# Patient Record
Sex: FEMALE | Race: WHITE | NOT HISPANIC OR LATINO | Employment: FULL TIME | URBAN - METROPOLITAN AREA
[De-identification: names, ages, dates, MRNs, and addresses within clinical notes are randomized per-mention and may not be internally consistent; named-entity substitution may affect disease eponyms.]

---

## 2017-01-17 ENCOUNTER — HISTORICAL (OUTPATIENT)
Dept: CARDIOLOGY | Facility: HOSPITAL | Age: 32
End: 2017-01-17

## 2017-02-06 ENCOUNTER — HISTORICAL (OUTPATIENT)
Dept: RADIOLOGY | Facility: HOSPITAL | Age: 32
End: 2017-02-06

## 2017-07-03 ENCOUNTER — HISTORICAL (OUTPATIENT)
Dept: LAB | Facility: HOSPITAL | Age: 32
End: 2017-07-03

## 2017-07-03 LAB
ABS NEUT (OLG): 5.81 X10(3)/MCL (ref 2.1–9.2)
BASOPHILS # BLD AUTO: 0.05 X10(3)/MCL
BASOPHILS NFR BLD AUTO: 1 % (ref 0–1)
EOSINOPHIL # BLD AUTO: 0.17 10*3/UL
EOSINOPHIL NFR BLD AUTO: 2 % (ref 0–5)
ERYTHROCYTE [DISTWIDTH] IN BLOOD BY AUTOMATED COUNT: 12.2 % (ref 11.5–14.5)
HCT VFR BLD AUTO: 35.6 % (ref 35–46)
HGB BLD-MCNC: 12.2 GM/DL (ref 12–16)
IMM GRANULOCYTES # BLD AUTO: 0.05 10*3/UL
IMM GRANULOCYTES NFR BLD AUTO: 1 %
LYMPHOCYTES # BLD AUTO: 1.98 X10(3)/MCL
LYMPHOCYTES NFR BLD AUTO: 23 % (ref 15–40)
MCH RBC QN AUTO: 29.7 PG (ref 26–34)
MCHC RBC AUTO-ENTMCNC: 34.3 GM/DL (ref 31–37)
MCV RBC AUTO: 86.6 FL (ref 80–100)
MONOCYTES # BLD AUTO: 0.72 X10(3)/MCL
MONOCYTES NFR BLD AUTO: 8 % (ref 4–12)
NEUTROPHILS # BLD AUTO: 5.81 X10(3)/MCL
NEUTROPHILS NFR BLD AUTO: 66 X10(3)/MCL
PLATELET # BLD AUTO: 275 X10(3)/MCL (ref 130–400)
PMV BLD AUTO: 11.5 FL (ref 7.4–10.4)
RBC # BLD AUTO: 4.11 X10(6)/MCL (ref 4–5.2)
WBC # SPEC AUTO: 8.8 X10(3)/MCL (ref 4.5–11)

## 2017-07-07 ENCOUNTER — HISTORICAL (OUTPATIENT)
Dept: ADMINISTRATIVE | Facility: HOSPITAL | Age: 32
End: 2017-07-07

## 2017-08-25 ENCOUNTER — HISTORICAL (OUTPATIENT)
Dept: CARDIOLOGY | Facility: CLINIC | Age: 32
End: 2017-08-25

## 2017-08-25 LAB
ALBUMIN SERPL-MCNC: 3.8 GM/DL (ref 3.4–5)
ALBUMIN/GLOB SERPL: 1 RATIO (ref 1–2)
ALP SERPL-CCNC: 65 UNIT/L (ref 45–117)
ALT SERPL-CCNC: 36 UNIT/L (ref 12–78)
AST SERPL-CCNC: 20 UNIT/L (ref 15–37)
BILIRUB SERPL-MCNC: 0.2 MG/DL (ref 0.2–1)
BILIRUBIN DIRECT+TOT PNL SERPL-MCNC: <0.1 MG/DL
BILIRUBIN DIRECT+TOT PNL SERPL-MCNC: >0.1 MG/DL
BUN SERPL-MCNC: 14 MG/DL (ref 7–18)
CALCIUM SERPL-MCNC: 8.9 MG/DL (ref 8.5–10.1)
CHLORIDE SERPL-SCNC: 103 MMOL/L (ref 98–107)
CO2 SERPL-SCNC: 30 MMOL/L (ref 21–32)
CREAT SERPL-MCNC: 0.7 MG/DL (ref 0.6–1.3)
DEPRECATED CALCIDIOL+CALCIFEROL SERPL-MC: 24.56 NG/ML (ref 30–80)
GLOBULIN SER-MCNC: 4.1 GM/ML (ref 2.3–3.5)
GLUCOSE SERPL-MCNC: 81 MG/DL (ref 74–106)
POTASSIUM SERPL-SCNC: 4.3 MMOL/L (ref 3.5–5.1)
PROT SERPL-MCNC: 7.9 GM/DL (ref 6.4–8.2)
SODIUM SERPL-SCNC: 140 MMOL/L (ref 136–145)
T4 FREE SERPL-MCNC: 0.9 NG/DL (ref 0.76–1.46)
TSH SERPL-ACNC: 1.63 MIU/L (ref 0.36–3.74)

## 2017-08-30 ENCOUNTER — HISTORICAL (OUTPATIENT)
Dept: RADIOLOGY | Facility: HOSPITAL | Age: 32
End: 2017-08-30

## 2017-09-06 ENCOUNTER — HISTORICAL (OUTPATIENT)
Dept: ADMINISTRATIVE | Facility: HOSPITAL | Age: 32
End: 2017-09-06

## 2017-09-15 ENCOUNTER — HISTORICAL (OUTPATIENT)
Dept: CARDIOLOGY | Facility: HOSPITAL | Age: 32
End: 2017-09-15

## 2017-09-22 ENCOUNTER — HISTORICAL (OUTPATIENT)
Dept: CARDIOLOGY | Facility: CLINIC | Age: 32
End: 2017-09-22

## 2017-10-13 ENCOUNTER — HISTORICAL (OUTPATIENT)
Dept: INTERNAL MEDICINE | Facility: CLINIC | Age: 32
End: 2017-10-13

## 2017-10-13 LAB
APPEARANCE, UA: CLEAR
BACTERIA #/AREA URNS AUTO: ABNORMAL /[HPF]
BILIRUB UR QL STRIP: NEGATIVE
COLOR UR: YELLOW
GLUCOSE (UA): NORMAL
HGB UR QL STRIP: NEGATIVE
HYALINE CASTS #/AREA URNS LPF: ABNORMAL /[LPF]
KETONES UR QL STRIP: NEGATIVE
LEUKOCYTE ESTERASE UR QL STRIP: NEGATIVE
NITRITE UR QL STRIP: NEGATIVE
PH UR STRIP: 5.5 [PH] (ref 4.5–8)
PROT UR QL STRIP: NEGATIVE
RBC #/AREA URNS AUTO: ABNORMAL /[HPF]
SP GR UR STRIP: 1.02 (ref 1–1.03)
SQUAMOUS #/AREA URNS LPF: ABNORMAL /[LPF]
UROBILINOGEN UR STRIP-ACNC: NORMAL
WBC #/AREA URNS AUTO: ABNORMAL /HPF

## 2017-10-17 ENCOUNTER — HISTORICAL (OUTPATIENT)
Dept: INTERNAL MEDICINE | Facility: CLINIC | Age: 32
End: 2017-10-17

## 2017-10-17 LAB
INR PPP: 0.94 (ref 0.9–1.2)
PROTHROMBIN TIME: 12.4 SECOND(S) (ref 11.9–14.4)

## 2017-11-02 ENCOUNTER — HISTORICAL (OUTPATIENT)
Dept: RADIOLOGY | Facility: HOSPITAL | Age: 32
End: 2017-11-02

## 2017-11-03 ENCOUNTER — HISTORICAL (OUTPATIENT)
Dept: INTERNAL MEDICINE | Facility: CLINIC | Age: 32
End: 2017-11-03

## 2017-11-30 ENCOUNTER — HISTORICAL (OUTPATIENT)
Dept: RADIOLOGY | Facility: HOSPITAL | Age: 32
End: 2017-11-30

## 2018-02-21 ENCOUNTER — HISTORICAL (OUTPATIENT)
Dept: WOUND CARE | Facility: HOSPITAL | Age: 33
End: 2018-02-21

## 2018-02-23 ENCOUNTER — HISTORICAL (OUTPATIENT)
Dept: RADIOLOGY | Facility: HOSPITAL | Age: 33
End: 2018-02-23

## 2018-03-21 ENCOUNTER — HISTORICAL (OUTPATIENT)
Dept: RADIOLOGY | Facility: HOSPITAL | Age: 33
End: 2018-03-21

## 2018-04-03 ENCOUNTER — HISTORICAL (OUTPATIENT)
Dept: RADIOLOGY | Facility: HOSPITAL | Age: 33
End: 2018-04-03

## 2018-04-27 ENCOUNTER — HISTORICAL (OUTPATIENT)
Dept: LAB | Facility: HOSPITAL | Age: 33
End: 2018-04-27

## 2018-04-27 LAB
ABS NEUT (OLG): 3.91 X10(3)/MCL (ref 2.1–9.2)
APPEARANCE, UA: CLEAR
BACTERIA #/AREA URNS AUTO: ABNORMAL /[HPF]
BASOPHILS # BLD AUTO: 0.05 X10(3)/MCL
BASOPHILS NFR BLD AUTO: 1 %
BILIRUB UR QL STRIP: NEGATIVE
BUN SERPL-MCNC: 19 MG/DL (ref 7–18)
CALCIUM SERPL-MCNC: 8.6 MG/DL (ref 8.5–10.1)
CHLORIDE SERPL-SCNC: 109 MMOL/L (ref 98–107)
CO2 SERPL-SCNC: 27 MMOL/L (ref 21–32)
COLOR UR: YELLOW
CREAT SERPL-MCNC: 0.9 MG/DL (ref 0.6–1.3)
CREAT/UREA NIT SERPL: 21
EOSINOPHIL # BLD AUTO: 0.49 X10(3)/MCL
EOSINOPHIL NFR BLD AUTO: 7 %
ERYTHROCYTE [DISTWIDTH] IN BLOOD BY AUTOMATED COUNT: 11.9 % (ref 11.5–14.5)
GLUCOSE (UA): NORMAL
GLUCOSE SERPL-MCNC: 92 MG/DL (ref 74–106)
HAV IGM SERPL QL IA: NONREACTIVE
HBV CORE IGM SERPL QL IA: NONREACTIVE
HBV SURFACE AG SERPL QL IA: NEGATIVE
HCT VFR BLD AUTO: 41.4 % (ref 35–46)
HCV AB SERPL QL IA: NONREACTIVE
HGB BLD-MCNC: 14.2 GM/DL (ref 12–16)
HGB UR QL STRIP: NEGATIVE
HIV 1+2 AB+HIV1 P24 AG SERPL QL IA: NONREACTIVE
HYALINE CASTS #/AREA URNS LPF: ABNORMAL /[LPF]
IMM GRANULOCYTES # BLD AUTO: 0.02 10*3/UL
IMM GRANULOCYTES NFR BLD AUTO: 0 %
INR PPP: 0.95 (ref 0.9–1.2)
KETONES UR QL STRIP: NEGATIVE
LEUKOCYTE ESTERASE UR QL STRIP: NEGATIVE
LYMPHOCYTES # BLD AUTO: 1.85 X10(3)/MCL
LYMPHOCYTES NFR BLD AUTO: 27 % (ref 13–40)
MCH RBC QN AUTO: 29.8 PG (ref 26–34)
MCHC RBC AUTO-ENTMCNC: 34.3 GM/DL (ref 31–37)
MCV RBC AUTO: 86.8 FL (ref 80–100)
MONOCYTES # BLD AUTO: 0.58 X10(3)/MCL
MONOCYTES NFR BLD AUTO: 8 % (ref 4–12)
NEUTROPHILS # BLD AUTO: 3.91 X10(3)/MCL
NEUTROPHILS NFR BLD AUTO: 57 X10(3)/MCL
NITRITE UR QL STRIP: NEGATIVE
PH UR STRIP: 6 [PH] (ref 4.5–8)
PLATELET # BLD AUTO: 207 X10(3)/MCL (ref 130–400)
PMV BLD AUTO: 11.1 FL (ref 7.4–10.4)
POTASSIUM SERPL-SCNC: 4 MMOL/L (ref 3.5–5.1)
PROT UR QL STRIP: 20 MG/DL
PROTHROMBIN TIME: 12 SECOND(S) (ref 11.9–14.4)
RBC # BLD AUTO: 4.77 X10(6)/MCL (ref 4–5.2)
RBC #/AREA URNS AUTO: ABNORMAL /[HPF]
SODIUM SERPL-SCNC: 143 MMOL/L (ref 136–145)
SP GR UR STRIP: 1.03 (ref 1–1.03)
SQUAMOUS #/AREA URNS LPF: ABNORMAL /[LPF]
T PALLIDUM AB SER QL: NONREACTIVE
UROBILINOGEN UR STRIP-ACNC: NORMAL
WBC # SPEC AUTO: 6.9 X10(3)/MCL (ref 4.5–11)
WBC #/AREA URNS AUTO: ABNORMAL /HPF

## 2018-05-11 ENCOUNTER — HISTORICAL (OUTPATIENT)
Dept: ADMINISTRATIVE | Facility: HOSPITAL | Age: 33
End: 2018-05-11

## 2018-05-11 LAB
BILIRUB SERPL-MCNC: NEGATIVE MG/DL
BLOOD URINE, POC: NEGATIVE
CLARITY, POC UA: CLEAR
COLOR, POC UA: YELLOW
GLUCOSE UR QL STRIP: NEGATIVE
KETONES UR QL STRIP: NEGATIVE
LEUKOCYTE EST, POC UA: NEGATIVE
NITRITE, POC UA: NEGATIVE
PH, POC UA: 6.5
PROTEIN, POC: NEGATIVE
SPECIFIC GRAVITY, POC UA: 1.02
UROBILINOGEN, POC UA: NORMAL

## 2018-07-05 ENCOUNTER — HISTORICAL (OUTPATIENT)
Dept: CARDIOLOGY | Facility: CLINIC | Age: 33
End: 2018-07-05

## 2018-07-05 ENCOUNTER — HISTORICAL (OUTPATIENT)
Dept: CARDIOLOGY | Facility: HOSPITAL | Age: 33
End: 2018-07-05

## 2018-07-05 LAB
D DIMER PPP IA.FEU-MCNC: 0.56 MCG/ML FEU
INR PPP: 1.04 (ref 0.9–1.2)
PROTHROMBIN TIME: 12.9 SECOND(S) (ref 11.9–14.4)

## 2018-07-13 ENCOUNTER — HISTORICAL (OUTPATIENT)
Dept: INTERNAL MEDICINE | Facility: CLINIC | Age: 33
End: 2018-07-13

## 2018-07-17 ENCOUNTER — HISTORICAL (OUTPATIENT)
Dept: ADMINISTRATIVE | Facility: HOSPITAL | Age: 33
End: 2018-07-17

## 2018-07-19 LAB — FINAL CULTURE: NORMAL

## 2018-08-13 ENCOUNTER — HISTORICAL (OUTPATIENT)
Dept: RADIOLOGY | Facility: HOSPITAL | Age: 33
End: 2018-08-13

## 2018-09-12 ENCOUNTER — HISTORICAL (OUTPATIENT)
Dept: LAB | Facility: HOSPITAL | Age: 33
End: 2018-09-12

## 2018-09-12 LAB
ABS NEUT (OLG): 6.26 X10(3)/MCL (ref 2.1–9.2)
BASOPHILS # BLD AUTO: 0.05 X10(3)/MCL
BASOPHILS NFR BLD AUTO: 1 %
EOSINOPHIL # BLD AUTO: 0.22 X10(3)/MCL
EOSINOPHIL NFR BLD AUTO: 3 %
ERYTHROCYTE [DISTWIDTH] IN BLOOD BY AUTOMATED COUNT: 12 % (ref 11.5–14.5)
HCT VFR BLD AUTO: 41.5 % (ref 35–46)
HGB BLD-MCNC: 14.4 GM/DL (ref 12–16)
IMM GRANULOCYTES # BLD AUTO: 0.01 10*3/UL
IMM GRANULOCYTES NFR BLD AUTO: 0 %
LYMPHOCYTES # BLD AUTO: 1.52 X10(3)/MCL
LYMPHOCYTES NFR BLD AUTO: 18 % (ref 13–40)
MCH RBC QN AUTO: 29.5 PG (ref 26–34)
MCHC RBC AUTO-ENTMCNC: 34.7 GM/DL (ref 31–37)
MCV RBC AUTO: 85 FL (ref 80–100)
MONOCYTES # BLD AUTO: 0.54 X10(3)/MCL
MONOCYTES NFR BLD AUTO: 6 % (ref 4–12)
NEUTROPHILS # BLD AUTO: 6.26 X10(3)/MCL
NEUTROPHILS NFR BLD AUTO: 73 X10(3)/MCL
PLATELET # BLD AUTO: 230 X10(3)/MCL (ref 130–400)
PMV BLD AUTO: 10.9 FL (ref 7.4–10.4)
RBC # BLD AUTO: 4.88 X10(6)/MCL (ref 4–5.2)
WBC # SPEC AUTO: 8.6 X10(3)/MCL (ref 4.5–11)

## 2018-10-25 ENCOUNTER — HISTORICAL (OUTPATIENT)
Dept: LAB | Facility: HOSPITAL | Age: 33
End: 2018-10-25

## 2018-11-15 LAB
INFLUENZA A ANTIGEN, POC: NEGATIVE
INFLUENZA B ANTIGEN, POC: NEGATIVE
RAPID GROUP A STREP (OHS): NEGATIVE

## 2019-01-02 ENCOUNTER — HISTORICAL (OUTPATIENT)
Dept: ADMINISTRATIVE | Facility: HOSPITAL | Age: 34
End: 2019-01-02

## 2019-03-13 ENCOUNTER — HISTORICAL (OUTPATIENT)
Dept: RADIOLOGY | Facility: HOSPITAL | Age: 34
End: 2019-03-13

## 2019-03-13 LAB — POC CREATININE: 0.7 MG/DL (ref 0.6–1.3)

## 2019-03-29 ENCOUNTER — HISTORICAL (OUTPATIENT)
Dept: RADIOLOGY | Facility: HOSPITAL | Age: 34
End: 2019-03-29

## 2019-04-02 ENCOUNTER — HISTORICAL (OUTPATIENT)
Dept: INTENSIVE CARE | Facility: HOSPITAL | Age: 34
End: 2019-04-02

## 2019-04-05 ENCOUNTER — HISTORICAL (OUTPATIENT)
Dept: RADIOLOGY | Facility: HOSPITAL | Age: 34
End: 2019-04-05

## 2019-06-12 ENCOUNTER — TELEPHONE (OUTPATIENT)
Dept: RHEUMATOLOGY | Facility: CLINIC | Age: 34
End: 2019-06-12

## 2019-06-12 ENCOUNTER — OFFICE VISIT (OUTPATIENT)
Dept: INTERNAL MEDICINE | Facility: CLINIC | Age: 34
End: 2019-06-12
Payer: COMMERCIAL

## 2019-06-12 VITALS
DIASTOLIC BLOOD PRESSURE: 88 MMHG | SYSTOLIC BLOOD PRESSURE: 122 MMHG | TEMPERATURE: 98 F | WEIGHT: 197.75 LBS | HEART RATE: 102 BPM | OXYGEN SATURATION: 98 %

## 2019-06-12 DIAGNOSIS — L20.89 OTHER ATOPIC DERMATITIS: ICD-10-CM

## 2019-06-12 DIAGNOSIS — D32.0 MENINGIOMA, CEREBRAL: ICD-10-CM

## 2019-06-12 DIAGNOSIS — G89.29 OTHER CHRONIC PAIN: ICD-10-CM

## 2019-06-12 DIAGNOSIS — R76.8 POSITIVE ANA (ANTINUCLEAR ANTIBODY): Primary | ICD-10-CM

## 2019-06-12 DIAGNOSIS — M51.37 DDD (DEGENERATIVE DISC DISEASE), LUMBOSACRAL: ICD-10-CM

## 2019-06-12 PROBLEM — M51.379 DDD (DEGENERATIVE DISC DISEASE), LUMBOSACRAL: Status: ACTIVE | Noted: 2019-04-10

## 2019-06-12 PROBLEM — G47.00 INSOMNIA: Status: ACTIVE | Noted: 2019-06-12

## 2019-06-12 PROBLEM — K21.9 GASTROESOPHAGEAL REFLUX DISEASE: Status: ACTIVE | Noted: 2019-06-12

## 2019-06-12 PROBLEM — M50.30 DEGENERATIVE CERVICAL DISC: Status: ACTIVE | Noted: 2019-06-12

## 2019-06-12 PROBLEM — F41.9 ANXIETY: Status: ACTIVE | Noted: 2019-03-29

## 2019-06-12 PROBLEM — N30.10 INTERSTITIAL CYSTITIS: Status: ACTIVE | Noted: 2019-06-12

## 2019-06-12 PROBLEM — M62.838 MUSCLE SPASMS OF NECK: Status: ACTIVE | Noted: 2019-06-12

## 2019-06-12 PROBLEM — D33.2 BENIGN NEOPLASM OF BRAIN: Status: ACTIVE | Noted: 2019-06-12

## 2019-06-12 PROBLEM — M79.10 MYALGIA: Status: ACTIVE | Noted: 2019-03-29

## 2019-06-12 PROCEDURE — 99999 PR PBB SHADOW E&M-NEW PATIENT-LVL IV: ICD-10-PCS | Mod: PBBFAC,,, | Performed by: FAMILY MEDICINE

## 2019-06-12 PROCEDURE — 99999 PR PBB SHADOW E&M-NEW PATIENT-LVL IV: CPT | Mod: PBBFAC,,, | Performed by: FAMILY MEDICINE

## 2019-06-12 PROCEDURE — 99204 OFFICE O/P NEW MOD 45 MIN: CPT | Mod: S$GLB,,, | Performed by: FAMILY MEDICINE

## 2019-06-12 PROCEDURE — 99204 PR OFFICE/OUTPT VISIT, NEW, LEVL IV, 45-59 MIN: ICD-10-PCS | Mod: S$GLB,,, | Performed by: FAMILY MEDICINE

## 2019-06-12 RX ORDER — TRAMADOL HYDROCHLORIDE 50 MG/1
TABLET ORAL
Refills: 0 | COMMUNITY
Start: 2019-05-23 | End: 2019-06-27 | Stop reason: SDUPTHER

## 2019-06-12 RX ORDER — CETIRIZINE HYDROCHLORIDE 10 MG/1
10 TABLET ORAL DAILY
COMMUNITY
End: 2019-06-27

## 2019-06-12 RX ORDER — SUMATRIPTAN SUCCINATE 100 MG/1
TABLET ORAL
Refills: 0 | COMMUNITY
Start: 2019-03-28 | End: 2019-06-27

## 2019-06-12 RX ORDER — BUTALBITAL, ACETAMINOPHEN AND CAFFEINE 50; 325; 40 MG/1; MG/1; MG/1
TABLET ORAL
Refills: 0 | COMMUNITY
Start: 2019-03-28 | End: 2019-06-12

## 2019-06-12 RX ORDER — ALBUTEROL SULFATE 90 UG/1
2 AEROSOL, METERED RESPIRATORY (INHALATION) EVERY 6 HOURS PRN
COMMUNITY
Start: 2019-04-02

## 2019-06-12 RX ORDER — CLONAZEPAM 1 MG/1
1 TABLET ORAL 3 TIMES DAILY PRN
COMMUNITY
Start: 2018-09-22 | End: 2022-11-18 | Stop reason: SDUPTHER

## 2019-06-12 RX ORDER — HYDROCODONE BITARTRATE AND ACETAMINOPHEN 5; 325 MG/1; MG/1
TABLET ORAL
Refills: 0 | COMMUNITY
Start: 2019-03-29 | End: 2019-06-12

## 2019-06-12 RX ORDER — FLUTICASONE PROPIONATE 50 MCG
SPRAY, SUSPENSION (ML) NASAL
COMMUNITY
Start: 2018-12-02 | End: 2019-06-27

## 2019-06-12 RX ORDER — DOCUSATE SODIUM 100 MG
CAPSULE ORAL
Refills: 0 | COMMUNITY
Start: 2019-04-27 | End: 2019-06-12

## 2019-06-12 RX ORDER — OXCARBAZEPINE 150 MG/1
TABLET, FILM COATED ORAL
Refills: 0 | COMMUNITY
Start: 2019-03-21 | End: 2019-06-12

## 2019-06-12 RX ORDER — CARISOPRODOL 350 MG/1
175 TABLET ORAL NIGHTLY
Refills: 0 | COMMUNITY
Start: 2019-03-15 | End: 2019-06-12

## 2019-06-12 RX ORDER — ONDANSETRON 4 MG/1
TABLET, ORALLY DISINTEGRATING ORAL
COMMUNITY
Start: 2019-06-09 | End: 2019-06-12

## 2019-06-12 RX ORDER — PAROXETINE HYDROCHLORIDE 40 MG/1
TABLET, FILM COATED ORAL
Refills: 0 | COMMUNITY
Start: 2019-03-07 | End: 2019-06-27

## 2019-06-12 RX ORDER — ORPHENADRINE CITRATE 100 MG/1
TABLET, EXTENDED RELEASE ORAL
Refills: 2 | COMMUNITY
Start: 2019-04-24 | End: 2019-06-12

## 2019-06-12 NOTE — PROGRESS NOTES
[facility]  06/14/2019  [id number]    La Landrum MD  Patient Care Team:  La Landrum MD as PCP - General (Family Medicine)  Has the patient seen any provider outside of the [facility] network since the last visit? (yes). If yes, HIPPA forms completed and records requested.        Visit Type:Establish care    Chief Complaint:  No chief complaint on file.      History of Present Illness:  Patient new to [facility].  She has seen Dr. Liriano at [facility], and I can see those notes in care everywhere.  She had her care in [place] prior to relocating to [place].    She has history of Meningiomona, chronic Headaches, chronic rash and skin irritations, and positive SARAH    She is here for pain. She reports worsening over time. She reports that she has had falls. She reports pain all over. She is here because she is not sure what is going on.    She reports she saw Dr. uY. She reports he is neurologist. She had approached with to get an LP, to r/o MS.      She had presented to her primary care with a rash and joint pain she has seen Dr. Luz Burns dermatologist who diagnosed her with atopic dermatitis she was getting infusions for this and doing much better.     She also of note was diagnosed with a brain meningioma that is noninvasive at age 23. She was followed by Dr. Gray neurosurgeon she reports she is been having intermittent testing and he feels like it stable lately she has had some increased head pressure she had seen a second neurologist, as she continues to c/o headaches. She is treated for migraine headaches and has had cervical spine fusion surgery as well in 2009.   She has a history of endometriosis and had a partial hysterectomy at age 24.   She also reports she has chronic sinusitis and followed with ear nose and throat. Her recent workup for him was normal.     She also has chronic condition of interstitial cystitis with with an ovarian cyst that occasionally causes issues.    She is  "treated for depression anxiety as well as migraine headaches. 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  Lexapro, Paxil (Did not tolerate Cymbalta). She does have Klonopin.  She does have HA a lot and Nausea.     She has a true allergy to ibuprofen.     She reports joint pain particular involving both knees ankles wrists fingers and elbows. She is here c/o body pain.  She believes she had a positive SARAH test 1: 320 and elevated CRP on initial workup but those records are not yet available to me.  She did see Rheum at [facility], and I reviewed those results. Didn't see a indication that she had evidence of active autoimmune disease. Felt as if she had some overlapping fibromyalgia symptoms. DIdn't want to start any immunosuppressants and worked on weaning off chronic predinsone that her PCP started. Rash was dx as severe atopic dermaitits.     Rheum recommended on 4/10    " I see no signs of acute autoimmune disease specifically lupus or lupus-like condition or rheumatoid arthritis and provided reassurance to the patient I will continue to follow her positive SARAH test but at this time I do not think is clinically significant patient has weaned herself off her prednisone from 10 mg to 5 mg to 0 mg. Unfortunately her atopic dermatitis is now worse and her degenerative disc disease chronic pain is worse. She will follow-up with her dermatologist allergist and she recently saw neurologist as well. We did discuss possibly seeing an academic center due to her complicated medical case. I did reassure her that I do not see any signs of active autoimmune disease regarding lupus or rheumatoid arthritis but will continue to monitor. It is probable that some of her pain is related to fibromyalgia related to persistent pain and poor sleep. However I do not want to adjust any medications at this time she remains on Klonopin as needed and tramadol as needed I did give " "her a prescription of tramadol 1 every 8 hours as needed quantity 60 with no refills of asked her to follow-up with neurology for further care."    EEG done, she reports that she has been having "Spells" Blanking out, and she had trouble spelling names.     She comes in for the same issues today.     She is not aware of any family history of autoimmune disease she has 3 healthy children her parents are both .     History:  Past Medical History:   Diagnosis Date    ADHD (attention deficit hyperactivity disorder)     Allergy     Anxiety     Atopic dermatitis 2018    Brain tumor 2008    Endometriosis     GERD (gastroesophageal reflux disease)     IC (interstitial cystitis)     Obsessive-compulsive disorder     Seizures      Past Surgical History:   Procedure Laterality Date    AUGMENTATION OF BREAST      CERVICAL FUSION      HYSTERECTOMY      LAPAROSCOPIC DRAINAGE OF CYST OF OVARY      NASAL SEPTOPLASTY  2007     Family History   Problem Relation Age of Onset    Drug abuse Mother     Hypertension Mother     Heart disease Father     Hypertension Father     Alcohol abuse Father     Early death Father      Social History     Socioeconomic History    Marital status:      Spouse name: Not on file    Number of children: Not on file    Years of education: Not on file    Highest education level: Not on file   Occupational History    Not on file   Social Needs    Financial resource strain: Not on file    Food insecurity:     Worry: Not on file     Inability: Not on file    Transportation needs:     Medical: Not on file     Non-medical: Not on file   Tobacco Use    Smoking status: Former Smoker    Smokeless tobacco: Never Used   Substance and Sexual Activity    Alcohol use: Yes     Frequency: Monthly or less     Drinks per session: 1 or 2     Binge frequency: Never    Drug use: Never    Sexual activity: Yes   Lifestyle    Physical activity:     Days per week: Not on file     " Minutes per session: Not on file    Stress: Not at all   Relationships    Social connections:     Talks on phone: Not on file     Gets together: Not on file     Attends [employer] service: Not on file     Active member of club or organization: Not on file     Attends meetings of clubs or organizations: Not on file     Relationship status: Not on file   Other Topics Concern    Not on file   Social History Narrative    Not on file     Patient Active Problem List   Diagnosis    Anxiety    Benign neoplasm of brain    DDD (degenerative disc disease), lumbosacral    Degenerative cervical disc    Gastroesophageal reflux disease    Insomnia    Interstitial cystitis    Meningioma, cerebral    Muscle spasms of neck    Myalgia    Other atopic dermatitis    Positive SARAH (antinuclear antibody)     Review of patient's allergies indicates:   Allergen Reactions    Ibuprofen Anaphylaxis    Metoclopramide Anaphylaxis and Other (See Comments)     Other reaction(s): Paralysis of vertical movement      Carisoprodol Other (See Comments)    Gabapentin Other (See Comments)     Other reaction(s): C/O - a headache      Clindamycin Rash    Cyclobenzaprine Palpitations     Other reaction(s): SOB and increased heart rate      Morphine Itching    Penicillin v potassium Rash     Other reaction(s): Rash      Rizatriptan Nausea And Vomiting       The following were reviewed at this visit: active problem list, medication list, allergies, family history, social history, and health maintenance.    Medications:  Current Outpatient Medications on File Prior to Visit   Medication Sig Dispense Refill    albuterol (PROAIR HFA) 90 mcg/actuation inhaler Take by mouth.      cetirizine (ZYRTEC) 10 MG tablet Take 10 mg by mouth once daily.      clonazePAM (KLONOPIN) 1 MG tablet Take 1 mg by mouth 3 (three) times daily as needed.      dupilumab (DUPIXENT) 300 mg/2 mL Syrg Inject 300 mg into the skin.      fluticasone propionate  (FLONASE) 50 mcg/actuation nasal spray by Nasal route.      Lactobacillus rhamnosus GG (CULTURELLE) 10 billion cell capsule Take 1 capsule by mouth once daily.      traMADol (ULTRAM) 50 mg tablet TK 1 T PO Q 6 H AS NEEDED P FOR UP TO 30 DAYS  0    paroxetine (PAXIL) 40 MG tablet TK 1 T PO QD  0    sumatriptan (IMITREX) 100 MG tablet TK 1 T PO  Q 2 H PRF MIGRAINE FOR UP TO 7 DAYS. MAX DOSE A DAY IS 200MG  0     No current facility-administered medications on file prior to visit.        Medications have been reviewed and reconciled with patient at this visit.  Barriers to medications present (yes)    Adverse reactions to current medications (no)    Over the counter medications reviewed (Yes ), and if needed added to active Medication list at this visit.     Exam:  Wt Readings from Last 3 Encounters:   06/13/19 90.2 kg (198 lb 13.7 oz)   06/12/19 89.7 kg (197 lb 12 oz)     Temp Readings from Last 3 Encounters:   06/12/19 97.6 °F (36.4 °C) (Tympanic)     BP Readings from Last 3 Encounters:   06/13/19 (!) 128/92   06/12/19 122/88     Pulse Readings from Last 3 Encounters:   06/13/19 95   06/12/19 102     There is no height or weight on file to calculate BMI.      Review of Systems   Constitutional: Negative.  Negative for chills and fever.   HENT: Negative.  Negative for congestion, sinus pain and sore throat.    Eyes: Negative for blurred vision and double vision.   Respiratory: Negative for cough, sputum production, shortness of breath and wheezing.    Cardiovascular: Negative for chest pain, palpitations and leg swelling.   Gastrointestinal: Negative for abdominal pain, constipation, diarrhea, heartburn, nausea and vomiting.   Genitourinary: Negative.    Musculoskeletal: Positive for back pain, joint pain and neck pain.   Skin: Negative.  Negative for rash.   Neurological: Positive for headaches.   Endo/Heme/Allergies: Negative.  Negative for polydipsia. Does not bruise/bleed easily.   Psychiatric/Behavioral:  Negative for depression and substance abuse. The patient is nervous/anxious.      Physical Exam   Constitutional: She is oriented to person, place, and time. She appears well-developed and well-nourished. No distress.   HENT:   Head: Normocephalic and atraumatic.   Right Ear: External ear normal.   Left Ear: External ear normal.   Nose: Nose normal.   Mouth/Throat: Oropharynx is clear and moist. No oropharyngeal exudate.   Eyes: Pupils are equal, round, and reactive to light. Conjunctivae and EOM are normal. Right eye exhibits no discharge. Left eye exhibits no discharge.   Neck: Normal range of motion. Neck supple. No thyromegaly present.   Cardiovascular: Normal rate, regular rhythm, normal heart sounds and intact distal pulses.   No murmur heard.  Pulmonary/Chest: Effort normal and breath sounds normal. No respiratory distress. She has no wheezes.   Abdominal: Soft. Bowel sounds are normal. She exhibits no distension and no mass. There is no tenderness.   Musculoskeletal: Normal range of motion. She exhibits tenderness. She exhibits no edema.   Trigger point tenderness, cervical spine, reports leg pain and lower back pain.  Headaches with cervicalgia up to head.      No swelling in joint  Normal reflexes and strength throughout.   Lymphadenopathy:     She has no cervical adenopathy.   Neurological: She is alert and oriented to person, place, and time. No cranial nerve deficit.   Skin: Capillary refill takes less than 2 seconds. She is not diaphoretic.   Psychiatric: Her behavior is normal. Judgment and thought content normal.   Crying and tearful throughout interview and exam   Nursing note and vitals reviewed.      Laboratory Reviewed ({N/A)  No results found for: WBC, HGB, HCT, PLT, CHOL, TRIG, HDL, LDLDIRECT, ALT, AST, NA, K, CL, CREATININE, BUN, CO2, TSH, PSA, INR, GLUF, HGBA1C, MICROALBUR    Diagnoses and all orders for this visit:    Positive SARAH (antinuclear antibody)  -     Ambulatory Referral to  Rheumatology  -     Ambulatory Referral to Neurology  -     Ambulatory referral to [facility]  -     Ambulatory Referral to Neurology    Meningioma, cerebral  -     Ambulatory Referral to Rheumatology  -     Ambulatory Referral to Neurology    DDD (degenerative disc disease), lumbosacral  -     Ambulatory Referral to Rheumatology  -     Ambulatory Referral to Neurology  -     Ambulatory referral to [facility]  -     Ambulatory Referral to Neurology    Other atopic dermatitis  -     Ambulatory Referral to Rheumatology  -     Ambulatory Referral to Neurology    Other chronic pain  -     Ambulatory Referral to Rheumatology  -     Ambulatory Referral to Neurology  -     Ambulatory referral to [facility]  -     Ambulatory Referral to Neurology        Will review with Rheum.  Did not tolerate Cymbalta, Neurontin.  Need her to bring records of films.    Referred to Neurology. She is concerned with MS, but no weakness, but pain causing decrease in function.    Denies to me overt Anxiety, and reports not using Klonopin as much, and declines depression, but has frustration that she cannot work due to her pain.    Reviewed labs that were recently completed at [facility].    Plan to review to Neurology [place], for second opinion.   I will also consider pain management in [facility]. I will message provider about referral.          Care Plan/Goals: Reviewed  (Yes)  Goals     None          Follow up: No follow-ups on file.    After visit summary was printed and given to patient upon discharge today.  Patient goals and care plan are included in After Visit Summary.

## 2019-06-13 ENCOUNTER — INITIAL CONSULT (OUTPATIENT)
Dept: RHEUMATOLOGY | Facility: CLINIC | Age: 34
End: 2019-06-13
Payer: COMMERCIAL

## 2019-06-13 ENCOUNTER — LAB VISIT (OUTPATIENT)
Dept: LAB | Facility: HOSPITAL | Age: 34
End: 2019-06-13
Attending: INTERNAL MEDICINE
Payer: COMMERCIAL

## 2019-06-13 VITALS — WEIGHT: 198.88 LBS | HEART RATE: 95 BPM | DIASTOLIC BLOOD PRESSURE: 92 MMHG | SYSTOLIC BLOOD PRESSURE: 128 MMHG

## 2019-06-13 DIAGNOSIS — M79.7 FIBROMYALGIA: Primary | ICD-10-CM

## 2019-06-13 DIAGNOSIS — M79.7 FIBROMYALGIA: ICD-10-CM

## 2019-06-13 LAB — CK SERPL-CCNC: 106 U/L (ref 20–180)

## 2019-06-13 PROCEDURE — 99999 PR PBB SHADOW E&M-EST. PATIENT-LVL II: ICD-10-PCS | Mod: PBBFAC,,, | Performed by: INTERNAL MEDICINE

## 2019-06-13 PROCEDURE — 83516 IMMUNOASSAY NONANTIBODY: CPT | Mod: 59

## 2019-06-13 PROCEDURE — 36415 COLL VENOUS BLD VENIPUNCTURE: CPT

## 2019-06-13 PROCEDURE — 99999 PR PBB SHADOW E&M-EST. PATIENT-LVL II: CPT | Mod: PBBFAC,,, | Performed by: INTERNAL MEDICINE

## 2019-06-13 PROCEDURE — 86618 LYME DISEASE ANTIBODY: CPT

## 2019-06-13 PROCEDURE — 86235 NUCLEAR ANTIGEN ANTIBODY: CPT

## 2019-06-13 PROCEDURE — 82085 ASSAY OF ALDOLASE: CPT

## 2019-06-13 PROCEDURE — 82550 ASSAY OF CK (CPK): CPT

## 2019-06-13 PROCEDURE — 99204 OFFICE O/P NEW MOD 45 MIN: CPT | Mod: S$GLB,,, | Performed by: INTERNAL MEDICINE

## 2019-06-13 PROCEDURE — 86235 NUCLEAR ANTIGEN ANTIBODY: CPT | Mod: 59

## 2019-06-13 PROCEDURE — 99204 PR OFFICE/OUTPT VISIT, NEW, LEVL IV, 45-59 MIN: ICD-10-PCS | Mod: S$GLB,,, | Performed by: INTERNAL MEDICINE

## 2019-06-13 RX ORDER — HYDROCODONE BITARTRATE AND ACETAMINOPHEN 5; 325 MG/1; MG/1
TABLET ORAL
COMMUNITY
Start: 2018-12-12 | End: 2019-06-27

## 2019-06-13 RX ORDER — PANTOPRAZOLE SODIUM 40 MG/1
TABLET, DELAYED RELEASE ORAL
COMMUNITY
Start: 2018-12-02 | End: 2019-06-27

## 2019-06-13 RX ORDER — DEXTROAMPHETAMINE SACCHARATE, AMPHETAMINE ASPARTATE, DEXTROAMPHETAMINE SULFATE AND AMPHETAMINE SULFATE 7.5; 7.5; 7.5; 7.5 MG/1; MG/1; MG/1; MG/1
TABLET ORAL
COMMUNITY
Start: 2018-12-12 | End: 2019-06-27

## 2019-06-13 RX ORDER — METRONIDAZOLE 250 MG/1
TABLET ORAL
COMMUNITY
Start: 2019-04-02 | End: 2019-06-27

## 2019-06-13 NOTE — LETTER
June 13, 2019      Robyn Strickland MD  [address] [address] [address]           [facility]  [address] [address]  Phone: [phone]  Fax: [phone]          Patient: Mary Fernandez   MR Number: [id number]   YOB: 1985   Date of Visit: 6/13/2019       Dear Dr. Robyn Strickland:    Thank you for referring Mary Fernandez to me for evaluation. Attached you will find relevant portions of my assessment and plan of care.    If you have questions, please do not hesitate to call me. I look forward to following Mary Fernandez along with you.    Sincerely,    Tessa Styles MD    Enclosure  CC:  No Recipients    If you would like to receive this communication electronically, please contact [email or web address] or [phone] to request more information on [email or web address] Link access.    For providers and/or their staff who would like to refer a patient to [facility], please contact us through our one-stop-shop provider referral line, [facility], at [phone].    If you feel you have received this communication in error or would no longer like to receive these types of communications, please e-mail [email or web address]

## 2019-06-13 NOTE — PROGRESS NOTES
CC:  Chief Complaint   Patient presents with    Consult     Positive SARAH, bone pain       History of Present Illness:  Mary Tamez a 34 y.o.yo female   Patient Active Problem List   Diagnosis    Anxiety    Benign neoplasm of brain    DDD (degenerative disc disease), lumbosacral    Degenerative cervical disc    Gastroesophageal reflux disease    Insomnia    Interstitial cystitis    Meningioma, cerebral    Muscle spasms of neck    Myalgia    Other atopic dermatitis    Positive SARAH (antinuclear antibody)     Here for further evaluation of + SARAH 1:160 Homogenous , negative profile  She complains of generalized arthralgias.  She hurts all over  Pain continues all day  No relief with medications tramadol which she uses as needed  She has been intolerant to gabapentin and Cymbalta in the past  No joint swelling noted  Positive for dry eyes, dry mouth  + history of Raynaud's, no skin ulcerations  She also complains of tingling involving the left side of the body and was evaluated by neurologist.  No pathology noted    Duration of symptoms started in 2016  She was initially evaluated by Dr. Zimmerman   He did a thorough autoimmune workup which was negative except positive SARAH as mentioned above  She did mention of history of some insect bite, she requested to be tested for Lyme, which he did , she is not aware of result but was never treated for any Lyme disease    In April 2019 she was evaluated by  , in [facility]  She had extensive lab work done which was reviewed by me  RF/CCP negative  ESR/CRP normal    History of atopic dermatitis with severe rash in the past  Now resolved since starting dupixent       Past Medical History:   Diagnosis Date    ADHD (attention deficit hyperactivity disorder)     Allergy     Anxiety     Atopic dermatitis 2018    Brain tumor 2008    Endometriosis     GERD (gastroesophageal reflux disease)     IC (interstitial cystitis)     Obsessive-compulsive disorder      Seizures        Past Surgical History:   Procedure Laterality Date    AUGMENTATION OF BREAST      CERVICAL FUSION      HYSTERECTOMY      LAPAROSCOPIC DRAINAGE OF CYST OF OVARY      NASAL SEPTOPLASTY  2007         Social History     Tobacco Use    Smoking status: Former Smoker    Smokeless tobacco: Never Used   Substance Use Topics    Alcohol use: Yes     Frequency: Monthly or less     Drinks per session: 1 or 2     Binge frequency: Never    Drug use: Never       Family History   Problem Relation Age of Onset    Drug abuse Mother     Hypertension Mother     Heart disease Father     Hypertension Father     Alcohol abuse Father     Early death Father        Review of patient's allergies indicates:   Allergen Reactions    Ibuprofen Anaphylaxis    Metoclopramide Anaphylaxis and Other (See Comments)     Other reaction(s): Paralysis of vertical movement      Carisoprodol Other (See Comments)    Gabapentin Other (See Comments)     Other reaction(s): C/O - a headache      Clindamycin Rash    Cyclobenzaprine Palpitations     Other reaction(s): SOB and increased heart rate      Morphine Itching    Penicillin v potassium Rash     Other reaction(s): Rash      Rizatriptan Nausea And Vomiting             Review of Systems:  Constitutional: Denies fever, chills. No recent weight changes.   Fatigue: yes   Muscle weakness: no  Headaches:+ chronic headache  Eyes: No redness + dryness.  No recent visual changes.  ENT: +dry mouth. No oral or nasal ulcers.  Card: No chest pain.  Resp: No cough or sob.   Gastro: No nausea or vomiting.  No heartburn.  Constipation: no  Diarrhea: no  Genito:  No dysuria.  No genital ulcers.  Skin: No rash.  Raynauds:per Hpi , no clear history of Raynaud's she states sometimes she notices discolorations,   Spontaneously, but may be more prominent in cold weather  Neuro: + numbness / tingling- left side of the body  Psych: + depression, anxiety  Endo:  no excess thirst.  Heme: no  abnormal bleeding or bruising  Clots:none   Miscarriages : none     OBJECTIVE:     Vital Signs   Vitals:    06/13/19 1208   BP: (!) 128/92   Pulse: 95     Physical Exam:  General Appearance:  NAD.   Gait: not favoring.  HEENT: PERRL.  Eyes not dry or injected.  No nasal ulcers.  Mouth not dry, no oral lesions.  Lymph: cervical, supraclavicular or axillary nodes: none abnormal   Cardio: no irregularity of S1 or S2.  No gallops or rubs.   Resp: Normal respiratory motion. Clear to auscultation bilaterally.   No abnormal chest conformation.  Abd: Soft, non-tender, nondistended.  No masses.   Skin: Head and neck,  and extremities examined. No rashes.   Neuro: Ox3.   Cranial nerves II-XII grossly intact.   Sensation intact  in both distal LE and upper extremities to light touch.  Musculoskeletal Exam:    Right Side Rheumatological Exam     Examination finds the shoulder, elbow, wrist, knee, 1st PIP, 1st MCP, 2nd PIP, 2nd MCP, 3rd PIP, 3rd MCP, 4th PIP, 4th MCP, 5th PIP and 5th MCP no synovitis     Others :  Hip: No synovitis   Ankle :No synovitis   Foot:No synovitis     Left Side Rheumatological Exam     Examination finds the shoulder, elbow, wrist, knee, 1st PIP, 1st MCP, 2nd PIP, 2nd MCP, 3rd PIP, 3rd MCP, 4th PIP, 4th MCP, 5th PIP and 5th MCP no synovitis     Others :  Hip:No synovitis   Ankle :No synovitis   Foot:No synovitis       Tender points:+++  Muscle strength:Equal and full in all mm groups of the upper and lower ext.    Laboratory: No results found for this or any previous visit.  Imaging :    Notes reviewed  Other procedures:    ASSESSMENT/PLAN:     Fibromyalgia  -     Myositis AssessR Plus Kassy-1; Future; Expected date: 06/13/2019  -     CK; Standing  -     Aldolase; Standing  -     B. burgdorferi Abs (Lyme Disease); Future; Expected date: 06/13/2019  -     MyoMarker Panel 3; Future; Expected date: 06/13/2019      Autoimmune workup so far negative except SARAH  RF/CCP negative  ESR/CRP  Negative  UA - no blood  or protein  Normal complements     No evidence of connective tissue disease noted on exam  Physical exam consistent with fibromyalgia  Check further labs as above  Intolerant to gabapentin and Cymbalta in the past  On tramadol p.r.n.      SARAH + ve  1:160 homo , negative profile  With sicca symptoms, ?RP  As above will continue to monitor    Neuropathy:  She was evaluated by a neurologist at [place]  No pathology identified    2 week return    Risks vs Benefits and potential side effects of medication prescribed today were discussed with patient. Medication literature given to patient up discharge  Went over uptodate information /literature on the meds prescribed today      Disclaimer: This note was prepared using voice recognition system and is likely to have sound alike errors and is not proof read.  Please call me with any questions.

## 2019-06-14 ENCOUNTER — TELEPHONE (OUTPATIENT)
Dept: ADMINISTRATIVE | Facility: OTHER | Age: 34
End: 2019-06-14

## 2019-06-14 ENCOUNTER — TELEPHONE (OUTPATIENT)
Dept: INTERNAL MEDICINE | Facility: CLINIC | Age: 34
End: 2019-06-14

## 2019-06-14 LAB — ALDOLASE SERPL-CCNC: 4.5 U/L (ref 1.2–7.6)

## 2019-06-14 NOTE — TELEPHONE ENCOUNTER
Notify patient, I have referred to the pain management restorative clinic in [place].   I am awaiting Neurology to respond as I would like to get her with the correct Neurology to review MS work up with her.    I saw that she saw Rheum    Dr. Strickland

## 2019-06-14 NOTE — TELEPHONE ENCOUNTER
Pt returned call regarding FRP. I explained the program and she was interested. She does live in [place], but could potentially stay in the [facility] depending on price. Pt was tearful when discussing her pain and ability to function. I will send [facility] and website info.

## 2019-06-17 ENCOUNTER — TELEPHONE (OUTPATIENT)
Dept: PAIN MEDICINE | Facility: OTHER | Age: 34
End: 2019-06-17

## 2019-06-17 LAB — B BURGDOR AB SER IA-ACNC: 0.1 INDEX VALUE

## 2019-06-17 NOTE — TELEPHONE ENCOUNTER
I called pt re: FRP Med screen appt for tomorrow with me. Need to reschedule from 1:00 to 2:00 which she accepted, however she did express concerns about coming from [place] for the program if she were to enroll. Has financial concerns about travel/lodging. I provided her with the [employer] phone # to inquire about rates. She did call them and called me back. Stated it would be too expensive. Advised I will ask our Manager about Patient Assistance Funding and get back to her later this week. She is definitely interested in the program if she can make it work logistically/financially.

## 2019-06-20 ENCOUNTER — TELEPHONE (OUTPATIENT)
Dept: PAIN MEDICINE | Facility: OTHER | Age: 34
End: 2019-06-20

## 2019-06-20 NOTE — TELEPHONE ENCOUNTER
called pt to check-in about FRP/financial assistance for lodging. no new info on assistance for hotel besides what we know, but we are working on it. Advised we do have pt assistance funds but need to discuss with team re: allocation of those funds. she also has several appts with specialists coming up in June/July (R[facility] and Neuro), and I advised it will be best to have those appts prior to being screening for FRP. She agrees. Will reach out around 8/1.

## 2019-06-25 LAB
EJ AB SER QL: NOT DETECTED
ENA JO1 AB SER IA-ACNC: <1 INDEX
KU AB SER QL: NOT DETECTED
MI2 AB SER QL: NOT DETECTED
OJ AB SER QL: NOT DETECTED
PL12 AB SER QL: NOT DETECTED
PL7 AB SER QL: NOT DETECTED
SRP AB SERPL QL: NOT DETECTED

## 2019-06-27 ENCOUNTER — HOSPITAL ENCOUNTER (OUTPATIENT)
Dept: RADIOLOGY | Facility: HOSPITAL | Age: 34
Discharge: HOME | End: 2019-06-27
Attending: INTERNAL MEDICINE
Payer: COMMERCIAL

## 2019-06-27 ENCOUNTER — OFFICE VISIT (OUTPATIENT)
Dept: RHEUMATOLOGY | Facility: CLINIC | Age: 34
End: 2019-06-27
Payer: COMMERCIAL

## 2019-06-27 VITALS — HEART RATE: 101 BPM | SYSTOLIC BLOOD PRESSURE: 127 MMHG | DIASTOLIC BLOOD PRESSURE: 92 MMHG | WEIGHT: 201.25 LBS

## 2019-06-27 DIAGNOSIS — R76.8 ANA POSITIVE: ICD-10-CM

## 2019-06-27 DIAGNOSIS — M35.9 UNDIFFERENTIATED CONNECTIVE TISSUE DISEASE: ICD-10-CM

## 2019-06-27 DIAGNOSIS — M79.7 FIBROMYALGIA: ICD-10-CM

## 2019-06-27 DIAGNOSIS — M35.9 UNDIFFERENTIATED CONNECTIVE TISSUE DISEASE: Primary | ICD-10-CM

## 2019-06-27 PROCEDURE — 73130 PR  X-RAY HAND 3+ VW: ICD-10-PCS | Mod: 26,LT,, | Performed by: RADIOLOGY

## 2019-06-27 PROCEDURE — 73630 X-RAY EXAM OF FOOT: CPT | Mod: 26,RT,, | Performed by: RADIOLOGY

## 2019-06-27 PROCEDURE — 73130 X-RAY EXAM OF HAND: CPT | Mod: 26,RT,, | Performed by: RADIOLOGY

## 2019-06-27 PROCEDURE — 73610 PR  X-RAY ANKLE 3+ VW: ICD-10-PCS | Mod: 26,RT,, | Performed by: RADIOLOGY

## 2019-06-27 PROCEDURE — 73610 X-RAY EXAM OF ANKLE: CPT | Mod: 26,RT,, | Performed by: RADIOLOGY

## 2019-06-27 PROCEDURE — 99214 PR OFFICE/OUTPT VISIT, EST, LEVL IV, 30-39 MIN: ICD-10-PCS | Mod: S$GLB,,, | Performed by: INTERNAL MEDICINE

## 2019-06-27 PROCEDURE — 99999 PR PBB SHADOW E&M-EST. PATIENT-LVL IV: ICD-10-PCS | Mod: PBBFAC,,, | Performed by: INTERNAL MEDICINE

## 2019-06-27 PROCEDURE — 73630 X-RAY EXAM OF FOOT: CPT | Mod: 26,LT,, | Performed by: RADIOLOGY

## 2019-06-27 PROCEDURE — 73630 X-RAY EXAM OF FOOT: CPT | Mod: 50,TC

## 2019-06-27 PROCEDURE — 99999 PR PBB SHADOW E&M-EST. PATIENT-LVL IV: CPT | Mod: PBBFAC,,, | Performed by: INTERNAL MEDICINE

## 2019-06-27 PROCEDURE — 99214 OFFICE O/P EST MOD 30 MIN: CPT | Mod: S$GLB,,, | Performed by: INTERNAL MEDICINE

## 2019-06-27 PROCEDURE — 73130 X-RAY EXAM OF HAND: CPT | Mod: 26,LT,, | Performed by: RADIOLOGY

## 2019-06-27 PROCEDURE — 73610 X-RAY EXAM OF ANKLE: CPT | Mod: 26,LT,, | Performed by: RADIOLOGY

## 2019-06-27 PROCEDURE — 73610 X-RAY EXAM OF ANKLE: CPT | Mod: 50,TC

## 2019-06-27 PROCEDURE — 73130 X-RAY EXAM OF HAND: CPT | Mod: 50,TC

## 2019-06-27 PROCEDURE — 73630 XR FOOT COMPLETE 3 VIEW BILATERAL: ICD-10-PCS | Mod: 26,RT,, | Performed by: RADIOLOGY

## 2019-06-27 RX ORDER — HYDROXYCHLOROQUINE SULFATE 200 MG/1
200 TABLET, FILM COATED ORAL 2 TIMES DAILY
Qty: 60 TABLET | Refills: 6 | Status: SHIPPED | OUTPATIENT
Start: 2019-06-27 | End: 2022-07-15 | Stop reason: SDUPTHER

## 2019-06-27 RX ORDER — TRAMADOL HYDROCHLORIDE 50 MG/1
50 TABLET ORAL EVERY 12 HOURS PRN
Qty: 60 TABLET | Refills: 0 | Status: SHIPPED | OUTPATIENT
Start: 2019-06-27 | End: 2022-07-15 | Stop reason: ALTCHOICE

## 2019-06-27 NOTE — PROGRESS NOTES
CC:  Chief Complaint   Patient presents with    Fibromyalgia     2wk follow up-both ankles and top of left foot       History of Present Illness:  Mary Tamez a 34 y.o.yo female   Patient Active Problem List   Diagnosis    Anxiety    Benign neoplasm of brain    DDD (degenerative disc disease), lumbosacral    Degenerative cervical disc    Gastroesophageal reflux disease    Insomnia    Interstitial cystitis    Meningioma, cerebral    Muscle spasms of neck    Myalgia    Other atopic dermatitis    Positive SARAH (antinuclear antibody)     Here for  Follow-up of + SARAH 1:160 Homogenous , negative profile   and review of labs     since last visit further lab work including CK, aldolase, myositis panel have all been normal   Lyme negative   earlier she had RF/CCP negative normal complements and inflammatory markers   she continues to have generalized aches and pains   she hurts all over   she states she hurts in her ankles, feet, neck, back and knees, hands and wrists   per her when she was given a trial of prednisone in the past it helped   then Dr. Zimmerman  Started her on methotrexate,  However she got worried about the side effects and so never started   she never tried Plaquenil in the past   currently she is on tramadol as needed and this helps     she hurts in her neck, she had a neck fusion surgery- in C5 - C6 in the past   recent MRI C-spine revealed mild left foraminal stenosis C6 C7   she sees Neurosurgery for the same     she also had MRI of lumbar spine done which showed no significant disease   normal x-ray left hip and pelvis     normal MRI brain     these MRIs were reviewed from her phone    She complains of generalized arthralgias.  She hurts all over  Pain continues all day  No relief with medications tramadol which she uses as needed  She has been intolerant to gabapentin and Cymbalta in the past  No joint swelling noted  Positive for dry eyes, dry mouth  + history of Raynaud's, no skin  ulcerations  She also complains of tingling involving the left side of the body and was evaluated by neurologist.  No pathology noted    Duration of symptoms started in 2016  She was initially evaluated by Dr. Zimmerman   He did a thorough autoimmune workup which was negative except positive SARAH as mentioned above  She did mention of history of some insect bite, she requested to be tested for Lyme, which he did , she is not aware of result but was never treated for any Lyme disease    In April 2019 she was evaluated by  , in [place] clinic  She had extensive lab work done which was reviewed by me  RF/CCP negative  ESR/CRP normal    History of atopic dermatitis with severe rash in the past  Now resolved since starting dupixent       Past Medical History:   Diagnosis Date    ADHD (attention deficit hyperactivity disorder)     Allergy     Anxiety     Atopic dermatitis 2018    Brain tumor 2008    Endometriosis     GERD (gastroesophageal reflux disease)     IC (interstitial cystitis)     Obsessive-compulsive disorder     Seizures        Past Surgical History:   Procedure Laterality Date    AUGMENTATION OF BREAST      CERVICAL FUSION      HYSTERECTOMY      LAPAROSCOPIC DRAINAGE OF CYST OF OVARY      NASAL SEPTOPLASTY  2007         Social History     Tobacco Use    Smoking status: Former Smoker    Smokeless tobacco: Never Used   Substance Use Topics    Alcohol use: Yes     Frequency: Never     Drinks per session: 1 or 2     Binge frequency: Never    Drug use: Never       Family History   Problem Relation Age of Onset    Drug abuse Mother     Hypertension Mother     Heart disease Father     Hypertension Father     Alcohol abuse Father     Early death Father       family history of lupus in her paternal aunts    Review of patient's allergies indicates:   Allergen Reactions    Ibuprofen Anaphylaxis    Metoclopramide Anaphylaxis and Other (See Comments)     Other reaction(s): Paralysis of vertical  movement      Carisoprodol Other (See Comments)    Gabapentin Other (See Comments)     Other reaction(s): C/O - a headache      Clindamycin Rash    Cyclobenzaprine Palpitations     Other reaction(s): SOB and increased heart rate      Morphine Itching    Penicillin v potassium Rash     Other reaction(s): Rash      Rizatriptan Nausea And Vomiting             Review of Systems:  Constitutional: Denies fever, chills. No recent weight changes.   Fatigue: yes   Muscle weakness: no  Headaches:+ chronic headache  Eyes: No redness + dryness.  No recent visual changes.  ENT: +dry mouth. No oral or nasal ulcers.  Card: No chest pain.  Resp: No cough or sob.   Gastro: No nausea or vomiting.  No heartburn.  Constipation: no  Diarrhea: no  Genito:  No dysuria.  No genital ulcers.  Skin: No rash.  Raynauds:per Hpi , no clear history of Raynaud's she states sometimes she notices discolorations,   Spontaneously, but may be more prominent in cold weather  Neuro: + numbness / tingling- left side of the body  Psych: + depression, anxiety  Endo:  no excess thirst.  Heme: no abnormal bleeding or bruising  Clots:none   Miscarriages : none     OBJECTIVE:     Vital Signs   Vitals:    06/27/19 1132   BP: (!) 127/92   Pulse: 101     Physical Exam:  General Appearance:  NAD.   Gait: not favoring.  HEENT: PERRL.  Eyes not dry or injected.  No nasal ulcers.  Mouth not dry, no oral lesions.  Lymph: cervical, supraclavicular or axillary nodes: none abnormal   Cardio: no irregularity of S1 or S2.  No gallops or rubs.   Resp: Normal respiratory motion. Clear to auscultation bilaterally.   No abnormal chest conformation.  Abd: Soft, non-tender, nondistended.  No masses.   Skin: Head and neck,  and extremities examined. No rashes.   Neuro: Ox3.   Cranial nerves II-XII grossly intact.   Sensation intact  in both distal LE and upper extremities to light touch.  Musculoskeletal Exam:    No synovitis   facet tenderness in C-spine and  L-spine    Tender points:+++  Muscle strength:Equal and full in all mm groups of the upper and lower ext.    Laboratory:   Results for orders placed or performed in visit on 06/13/19   Myositis AssessR Plus Kassy-1   Result Value Ref Range    PL-7 Autoantibodies Not Detected Not Detected    PL-12 Autoantibodies Not Detected Not Detected    MI-2 Autoab Not Detected Not Detected    Ku Autoantibodies Not Detected Not Detected    EJ Autoantibodies Not Detected Not Detected    OJ Autoantibodies Not Detected Not Detected    SRP Autoantibodies Not Detected Not Detected    Kassy-1 Autoantibodies <1.00 <1.0 Index   CK   Result Value Ref Range     20 - 180 U/L   Aldolase   Result Value Ref Range    Aldolase 4.5 1.2 - 7.6 U/L   B. burgdorferi Abs (Lyme Disease)   Result Value Ref Range    Lyme Ab 0.10 <0.90 Index Value     Imaging : reviewed from her iPhone   details as mentioned above    Notes reviewed  Other procedures:    ASSESSMENT/PLAN:     Undifferentiated connective tissue disease  -     X-Ray Hand 3 View Bilateral; Future; Expected date: 06/27/2019  -     Cancel: X-Ray Foot AP Bilateral; Future; Expected date: 06/27/2019  -     HLA B27 Antigen; Future; Expected date: 06/27/2019  -     Cardiolipin antibody; Future; Expected date: 06/27/2019  -     DRVVT; Future; Expected date: 06/27/2019  -     Beta-2 glycoprotein Abs (IgA, IgG, IgM); Future; Expected date: 06/27/2019  -     hydroxychloroquine (PLAQUENIL) 200 mg tablet; Take 1 tablet (200 mg total) by mouth 2 (two) times daily.  Dispense: 60 tablet; Refill: 6  -     C-reactive protein; Standing  -     Sedimentation rate; Standing  -     X-Ray Ankle Complete Bilateral; Future; Expected date: 06/27/2019    SARAH positive    Fibromyalgia    Other orders  -     traMADol (ULTRAM) 50 mg tablet; Take 1 tablet (50 mg total) by mouth every 12 (twelve) hours as needed for Pain.  Dispense: 60 tablet; Refill: 0      SARAH positive1: 160 homogeneous negative profile  RF/CCP  negative  ESR/CRP  Negative  UA - no blood or protein  Normal complements     with polyarthralgia, sicca syndrome, Raynaud's   trial of Plaquenil 200 mg p.o. B.i.d.   she would follow up for annual eye checkups with her ophthalmologist   dry eyes use teardrop   dry mouth use Biotene oral spray    Fibromyalgia:  Intolerant to gabapentin and Cymbalta in the past  nsaids - anaphylactic reaction   On tramadol p.r.n.   exercise, weight loss    Neuropathy:   MRI brain reviewed normal   MRI C-spine C6-C7 foraminal stenosis  She was evaluated by a neurologist at [facility]  No pathology identified  Due to see neuro at [facility]  As they advised her to follow-up at a tertiary center     Plaquenil- allergy reaction, skin pigmentation always use sunscreens, ocular toxicity, myositis discussed    Risks vs Benefits and potential side effects of medication prescribed today were discussed with patient. Medication literature given to patient up discharge  Went over uptodate information /literature on the meds prescribed today      Disclaimer: This note was prepared using voice recognition system and is likely to have sound alike errors and is not proof read.  Please call me with any questions.

## 2019-06-27 NOTE — PATIENT INSTRUCTIONS
Hydroxychloroquine tablets  What is this medicine?  HYDROXYCHLOROQUINE (lizette drox ee KLOR oh kwin) is used to treat rheumatoid arthritis and systemic lupus erythematosus. It is also used to treat malaria.  How should I use this medicine?  Take this medicine by mouth with a glass of water. Follow the directions on the prescription label. If this medicine upsets your stomach take it with food or milk. Take your doses at regular intervals. Do not take your medicine more often than directed.  Talk to your pediatrician regarding the use of this medicine in children. Special care may be needed.  What side effects may I notice from receiving this medicine?  Side effects that you should report to your doctor or health care professional as soon as possible:  · allergic reactions like skin rash, itching or hives, swelling of the face, lips, or tongue  · change in vision  · fever, infection  · hearing loss or ringing  · muscle weakness, tremor, or numbness  · redness, blistering, peeling or loosening of the skin, including inside the mouth  · seizures  · unusual bleeding or bruising  · unusually weak or tired  Side effects that usually do not require medical attention (report to your doctor or health care professional if they continue or are bothersome):  · change in coloration of the mouth or skin  · dizziness  · hair loss, lightening  · headache  · irritability, nervousness, nightmares  · loss of appetite  · stomach upset, diarrhea  What may interact with this medicine?  · antacids  · botulinum toxins  · digoxin  · kaolin  · penicillamine  What if I miss a dose?  If you miss a dose, take it as soon as you can. If it is almost time for your next dose, take only that dose. Do not take double or extra doses.  Where should I keep my medicine?  Keep out of the reach of children. In children, this medicine can cause overdose with small doses.  Store at room temperature between 15 and 30 degrees C (59 and 86 degrees F). Protect  from moisture and light. Throw away any unused medicine after the expiration date.  What should I tell my health care provider before I take this medicine?  They need to know if you have any of these conditions:  · alcoholism  · anemia or other blood disorder  · eye disease  · glucose 6-phosphate dehydrogenase (G6PD) deficiency  · liver disease  · porphyria  · psoriasis  · an unusual or allergic reaction to chloroquine, hydroxychloroquine, other medicines, foods, dyes, or preservatives  · pregnant or trying to get pregnant  · breast-feeding  What should I watch for while using this medicine?  Visit your doctor or health care professional for regular check ups. Tell your doctor if your symptoms do not improve. Arthritis symptoms may take several weeks to improve. If you are taking this medicine for a long time, you will need important blood work done. You will also need to have your eyes checked as directed.  This medicine can make you more sensitive to the sun. Keep out of the sun. If you cannot avoid being in the sun, wear protective clothing and use sunscreen. Do not use sun lamps or tanning beds/booths.  Avoid antacids and kaolin containing products for 2 hours before and after taking a dose of this medicine.  NOTE:This sheet is a summary. It may not cover all possible information. If you have questions about this medicine, talk to your doctor, pharmacist, or health care provider. Copyright© 2017 [employer]

## 2019-07-01 ENCOUNTER — OFFICE VISIT (OUTPATIENT)
Dept: NEUROLOGY | Facility: CLINIC | Age: 34
End: 2019-07-01
Payer: COMMERCIAL

## 2019-07-01 ENCOUNTER — DOCUMENTATION ONLY (OUTPATIENT)
Dept: NEUROLOGY | Facility: CLINIC | Age: 34
End: 2019-07-01

## 2019-07-01 VITALS
WEIGHT: 202.81 LBS | DIASTOLIC BLOOD PRESSURE: 86 MMHG | SYSTOLIC BLOOD PRESSURE: 126 MMHG | BODY MASS INDEX: 32.59 KG/M2 | HEIGHT: 66 IN | HEART RATE: 80 BPM

## 2019-07-01 DIAGNOSIS — M79.7 FIBROMYALGIA: ICD-10-CM

## 2019-07-01 DIAGNOSIS — R20.0 NUMBNESS AND TINGLING: ICD-10-CM

## 2019-07-01 DIAGNOSIS — R26.89 ABNORMALITY OF GAIT DUE TO IMPAIRMENT OF BALANCE: ICD-10-CM

## 2019-07-01 DIAGNOSIS — R41.9 COGNITIVE COMPLAINTS: Primary | ICD-10-CM

## 2019-07-01 DIAGNOSIS — M79.2 NEUROPATHIC PAIN: ICD-10-CM

## 2019-07-01 DIAGNOSIS — R20.2 NUMBNESS AND TINGLING: ICD-10-CM

## 2019-07-01 PROCEDURE — 99999 PR PBB SHADOW E&M-EST. PATIENT-LVL III: CPT | Mod: PBBFAC,,, | Performed by: PSYCHIATRY & NEUROLOGY

## 2019-07-01 PROCEDURE — 99245 OFF/OP CONSLTJ NEW/EST HI 55: CPT | Mod: S$GLB,,, | Performed by: PSYCHIATRY & NEUROLOGY

## 2019-07-01 PROCEDURE — 99245 PR OFFICE CONSULTATION,LEVEL V: ICD-10-PCS | Mod: S$GLB,,, | Performed by: PSYCHIATRY & NEUROLOGY

## 2019-07-01 PROCEDURE — 99999 PR PBB SHADOW E&M-EST. PATIENT-LVL III: ICD-10-PCS | Mod: PBBFAC,,, | Performed by: PSYCHIATRY & NEUROLOGY

## 2019-07-01 RX ORDER — PREGABALIN 75 MG/1
CAPSULE ORAL
Qty: 120 CAPSULE | Refills: 3 | Status: SHIPPED | OUTPATIENT
Start: 2019-07-01 | End: 2019-07-08 | Stop reason: DRUGHIGH

## 2019-07-01 RX ORDER — LEVOCETIRIZINE DIHYDROCHLORIDE 5 MG/1
5 TABLET, FILM COATED ORAL DAILY
COMMUNITY
End: 2023-01-05

## 2019-07-01 RX ORDER — PREDNISONE 20 MG/1
TABLET ORAL
Qty: 23 TABLET | Refills: 0 | Status: SHIPPED | OUTPATIENT
Start: 2019-07-01 | End: 2020-05-21

## 2019-07-01 RX ORDER — FLUTICASONE PROPIONATE 50 MCG
1 SPRAY, SUSPENSION (ML) NASAL DAILY
COMMUNITY

## 2019-07-01 RX ORDER — OMEPRAZOLE 40 MG/1
40 CAPSULE, DELAYED RELEASE ORAL DAILY
Qty: 10 CAPSULE | Refills: 0 | Status: SHIPPED | OUTPATIENT
Start: 2019-07-01 | End: 2022-07-15 | Stop reason: SDUPTHER

## 2019-07-01 NOTE — PROGRESS NOTES
"NEUROLOGY CONSULT:  Referring provider: Robyn Strickland MD    Date of service: 7/1/2019   8:08 AM   Patient name: Mary Fenrandez  Patient MRN: [id number]    Chief Complaint/Reason for Consultation: second opinion, concern for MS.   Patient did not bring old records or disks of images.    History of Present Illness:   Mary Fernandez is a 34 y.o. RH with h/o positive SARAH, atopic dermatitis, cerebellum meningioma, lumbosacral DDD, h/o endometriosis, fibromyalgia, headaches with cervicalgia, and chronic pain, who presents due to concern for MS. Referred by PCP, Dr. Strickland in [place]. Previously seen by neurologist, Dr. Yu, then second neurologist for pain and c/o headaches; neither was certain that she has MS. Also seen by rheumatology Dr. Zimmerman in 2016 and Dr. Christian in [place] in 4/2019; no specific autoimmune disease diagnosed. Then seen by Dr. Styles here and confirmed fibromyalgia. As for MS work-up, per chart review it appears that she has had MRI brain and c-spine. Brain imaging was quoted as "normal" and C-spine showed C6-7 foraminal stenosis.    Symptoms have been gradual in onset for about 2-3 years. She cannot give timeline as she states that her memory is "zero". Poor memory, sometimes with word finding difficulty, and may forget how to spell simple words. Also having trouble remembering names Also with frequent headaches.  Reports having spells of "blanking out". Has had one spell where she was awake but unable to respond or react. This episode lasted maybe 10sec, but she's had other similar episodes in years past.  Constant falling, temperature dysregulation, right hand numbness x 1 week, generalized muscle pain/weakness, dropping things, decreased circulation in fingertips and s/t white or purple. Shooting/burning pain in her right foot.  Having difficulty standing from sitting position due to weakness and pain, falling (4x since January)  Urinary issues - Has has constant trickle and stress " incontinence  Headaches - with eye pain, photophobia and phonophobia, and nausea. Will take tylenol or tramadol, taking this daily. Previously tried fioricet.  Has insomnia, taking otc sleep aid    Review of Systems   Constitutional: Positive for chills and malaise/fatigue. Negative for fever and weight loss.   HENT: Positive for congestion, sinus pain and tinnitus. Negative for hearing loss.    Eyes: Positive for double vision and pain.   Respiratory: Positive for cough. Negative for hemoptysis.    Cardiovascular: Positive for chest pain, palpitations and leg swelling.   Gastrointestinal: Positive for abdominal pain, constipation and heartburn. Negative for blood in stool and diarrhea.   Genitourinary:        Stress incontinence and sexual difficulty   Musculoskeletal: Positive for back pain, falls, joint pain, myalgias and neck pain.   Skin: Positive for rash.   Neurological: Positive for dizziness, tingling, sensory change, speech change, weakness and headaches.   Psychiatric/Behavioral: Positive for memory loss. Negative for depression, substance abuse and suicidal ideas. The patient is nervous/anxious and has insomnia.           Past Medical History:   Diagnosis Date    ADHD (attention deficit hyperactivity disorder)     Allergy     Anxiety     Atopic dermatitis 2018    Brain tumor 2008    Endometriosis     GERD (gastroesophageal reflux disease)     IC (interstitial cystitis)     Obsessive-compulsive disorder     Seizures        Past Surgical History:   Procedure Laterality Date    AUGMENTATION OF BREAST      CERVICAL FUSION      HYSTERECTOMY      LAPAROSCOPIC DRAINAGE OF CYST OF OVARY      NASAL SEPTOPLASTY  2007       Current Outpatient Medications on File Prior to Visit   Medication Sig Dispense Refill Last Dose    albuterol (PROAIR HFA) 90 mcg/actuation inhaler Take by mouth.   Taking    clonazePAM (KLONOPIN) 1 MG tablet Take 1 mg by mouth 3 (three) times daily as needed.   Taking     "dupilumab (DUPIXENT) 300 mg/2 mL Syrg Inject 300 mg into the skin.   Taking    hydroxychloroquine (PLAQUENIL) 200 mg tablet Take 1 tablet (200 mg total) by mouth 2 (two) times daily. 60 tablet 6     Lactobacillus rhamnosus GG (CULTURELLE) 10 billion cell capsule Take 1 capsule by mouth once daily.   Taking    traMADol (ULTRAM) 50 mg tablet Take 1 tablet (50 mg total) by mouth every 12 (twelve) hours as needed for Pain. 60 tablet 0        Allergies:   Review of patient's allergies indicates:   Allergen Reactions    Ibuprofen Anaphylaxis    Metoclopramide Anaphylaxis and Other (See Comments)     Other reaction(s): Paralysis of vertical movement      Carisoprodol Other (See Comments)    Gabapentin Other (See Comments)     Other reaction(s): C/O - a headache      Clindamycin Rash    Cyclobenzaprine Palpitations     Other reaction(s): SOB and increased heart rate      Morphine Itching    Penicillin v potassium Rash     Other reaction(s): Rash      Rizatriptan Nausea And Vomiting       Family History:   Father -  at 47yo 2/2 massive heart attack.    Social history:   Occupation: . Previously working as an MA, quit due to the ongoing symptoms 2018  Education: vocational school  Tobacco: former smoker, quit 2-3 years ago after 0.5pk x 15yrs  Alcohol: no  Illicit substances: no  Living situation: lives with  and children        Examination:    /86   Pulse 80   Ht 5' 6" (1.676 m)   Wt 92 kg (202 lb 13.2 oz)   BMI 32.74 kg/m²   .  GENERAL: pleasant, NAD, WDWN. Appropriate mood and affect.   Mental Status: Awake, alert, fully oriented. Patient is a fair historian and follows examination well. Normal language function. No neglect.   CRANIAL NERVES:  II: PERRLA.    III, IV, VI: Gaze conjugate, EOMI  V: Sensation intact and symmetric to light touch V1-V3  VII: Symmetric facial motor function bilaterally  VIII: Intact to finger rub bilaterally  IX: Palate elevates symmetrically  XI: " Normal shoulder shrug bilaterally  XII: Tongue protrudes midline  MOTOR: No drift. Normal tone and bulk. Normal strength throughout.  SENSATION:  LT: Intact and symmetric in the bilateral upper and lower extremities.  Vibration: Intact and symmetric in the bilateral upper and lower extremities.  Romberg negative.  COORDINATION:  Finger to Nose Intact Bilaterally  DTRs:  ? Biceps Triceps Brachioradialis Knee Ankle   Right 2+ 2+ 2+ 2+ 2+   Left 2+ 2+ 2+ 2+ 2+   Plantar reflex downgoing bilaterally  GAIT: antalgic gait, 8.2s timed 25ft walk    Data:   Laboratory:   6/2019 serum: negative/normal - esr, crp, b2 glycoprotein, drvvt, cardiolipin ab, HLA B27, CK, lyme ab, aldolase, ck, myositis assessR plus Kassy-1  Weakly positive myomarker panel 3.      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 No other enhancement. 11/2017:  Midline tentorial dural based homogeneously enhancing 2 x2 x1.5cm mass. No abnormal signal intensities in the parenchyma. No other enhancing abnormalities.    MRI C-Spine w/wo 4/2019, 2/2018: Normal cord signal. No significant canal or foraminal narrowing. 4/2016: s/p post discectomy and fusion at C5-6, no residual stenosis    MRI L-Spine w/o 4/2019: Normal lordosis. No significant change since 3/2018. No significant canal or neuroforaminal stenosis. No disc herniations. 3/2018 No significant change compared to 4/4/16    MRV 3/2019: Dural venous sinuses patent      A/P:  1. Cognitive complaints, paresthesias, weakness, gait instability  · Assessment: Patient's primary concern and reason for consult is for MS. However, as discussed with patient, there is low concern for MS given clinical history/timeline of events and lack of classic lesions on MRI. However, I will evaluate for some metabolic and nutritious mimickers of MS that can lend to these symptoms. Other consideration is that of fibromyalgia contributing  to fogginess and pain sensations or radiculopathy or peripheral neuropathy lending to the parathesias.   · Imaging: no new imaging  · Labs: mimickers as noted above  · Procedures: EMG of b/l arms and left leg  · Recommending discussing treatment of her fibromyalgia with her rheumatologist.   · Medications: trial lyrica for neuropathic pain. Given titration schedule of 75mg bid x 1 week, then 150mg bid. A PARQ discussion was held for medication.  · Advised to avoid otc sleep aids with antihistamines    2. Analgesic overuse, intractable migraine w/o aura  · Extensive counseling given for both.  · Recommended to avoid any daily use of abortive headache medication. Counseled that headaches would get worse before they got better given chronic use of medication. Then limit use to less than 2 x per week.  · Given oral steroid taper for acute management of ongoing pain. Also given omeprazole to help with any stomach upset. PARQ discussion was held for both medications      Thank you for allowing me to participate in the care of Mary Fernandez.    I spent more than 90 minutes with the patient during the visit.  More than half of the visit was spent counseling the patient about possible diagnosis, further w/u, and treatment..

## 2019-07-01 NOTE — LETTER
July 9, 2019      Robyn Strickland MD  [address] [facility] [address]           [facility]  [address]  [facility] [address]  Phone: [phone]          Patient: Mary Fernandez   MR Number: [id number]   YOB: 1985   Date of Visit: 7/1/2019       Dear Dr. Robyn Strickland:    Thank you for referring Mary Fernandez to me for evaluation. Attached you will find relevant portions of my assessment and plan of care.    If you have questions, please do not hesitate to call me. I look forward to following Mary Fernandez along with you.    Sincerely,    Camrel Pino MD    Enclosure  CC:  No Recipients    If you would like to receive this communication electronically, please contact [email or web address] or [phone] to request more information on [employer] access.    For providers and/or their staff who would like to refer a patient to [facility], please contact us through our one-stop-shop provider referral line, [facility]att, at [phone].    If you feel you have received this communication in error or would no longer like to receive these types of communications, please e-mail [email or web address]

## 2019-07-02 ENCOUNTER — TELEPHONE (OUTPATIENT)
Dept: RHEUMATOLOGY | Facility: CLINIC | Age: 34
End: 2019-07-02

## 2019-07-02 ENCOUNTER — DOCUMENTATION ONLY (OUTPATIENT)
Dept: NEUROLOGY | Facility: CLINIC | Age: 34
End: 2019-07-02

## 2019-07-02 NOTE — TELEPHONE ENCOUNTER
Pt states she can not take Robaxin, had ultrasound about 1yr ago, can not afford to go to ER again, ask if you can order Ultrasound sukumar outpatient testing for her. Please advise

## 2019-07-02 NOTE — PROGRESS NOTES
Received medical records and MRI disc from [facility].  MRI disc sent for upload.  Medical records given to Dr. Pino

## 2019-07-02 NOTE — TELEPHONE ENCOUNTER
Returned call to pt, states having severe back pain and lower leg/foot pain can not walk or stand. Not discolored but is bigger than right leg in calf area. States has gone to ER  several times for same issue & just send her off with pain med and refer to us & PCP.  In severe pain Please advise

## 2019-07-02 NOTE — TELEPHONE ENCOUNTER
----- Message from Jennie Blank sent at 7/2/2019 11:30 AM CDT -----  Type:  Needs Medical Advice    Who Called:  Pt  Mary  Symptoms (please be specific):  Severe pain left ankle and foot   How long has patient had these symptoms:     Pharmacy name and phone #:    Would the patient rather a call back or a response via [facility]?  Call back  Best Call Back Number:   [phone]  Additional Information:  Please call [email or web address]

## 2019-07-06 LAB
ANTI-PM/SCL AB: <20 UNITS
ANTI-SS-A 52 KD AB, IGG: 23 UNITS
EJ AB SER QL: NEGATIVE
ENA JO1 AB SER IA-ACNC: <20 UNITS
ENA SM+RNP AB SER IA-ACNC: <20 UNITS
FIBRILLARIN (U3 RNP): NEGATIVE
KU AB SER QL: NEGATIVE
MDA-5 (P140): <20 UNITS
MI2 AB SER QL: NEGATIVE
NXP-2 (P140): <20 UNITS
OJ AB SER QL: NEGATIVE
PL12 AB SER QL: NEGATIVE
PL7 AB SER QL: NEGATIVE
SRP AB SERPL QL: NEGATIVE
TIF1 GAMMA (P155/140): <20 UNITS
U2 SNRNP: NEGATIVE

## 2019-07-08 DIAGNOSIS — G62.9 NEUROPATHY: Primary | ICD-10-CM

## 2019-07-08 RX ORDER — PREGABALIN 150 MG/1
150 CAPSULE ORAL 2 TIMES DAILY
Qty: 60 CAPSULE | Refills: 6 | Status: SHIPPED | OUTPATIENT
Start: 2019-07-08 | End: 2019-09-16 | Stop reason: SDUPTHER

## 2019-07-08 NOTE — TELEPHONE ENCOUNTER
PT ALLOWED ONLY 3 PILLS PER DAY; PT WILL P/U SCRIPT FOR 14 75 MG PILLS FOR THE FIRST WEEK; ADVISED PT THAT WE WILL SEND  MG TABLETS FOR THE WEEKS THEREAFTER TO ACCOMODATE THE INSURANCE'S REQUIREMENT OF 3 PILLS PER DAY MAXIMUM; PT VERBALIZED UNDERSTANDING; PT C/O RIGHT THUMB NUMBNESS AND TINGLING SENSATIONS SINCE Saturday; HAS BEEN CONSTANT PER PT; PT STATES THAT THIS IS HER FIRST TIME HAVING ANY SYMPTOMS ON HER RIGHT SIDE; PT STATES THAT SHE STOPPED TRAMADOL AND STARTED WITH SOME LYRICA SAMPLES THAT SHE HAD AT HOME FOR LYRICA 50 MG; ADVISED PT TO STOP 50 MG LYRICA AND START THE PRESCRIBED DOSAGE AS SOON AS POSSIBLE; ADVISED PT THAT I WOULD CONSULT PROVIDER WITH COMPLAINTS AND F/U WITH HER EITHER VIA MYCHART OR PHONE CALL; PT VERBALIZED UNDERSTANDING;

## 2019-07-08 NOTE — TELEPHONE ENCOUNTER
----- Message from Danis Champagne sent at 7/8/2019 12:09 PM CDT -----  Contact: Patient @ [phone]  Patient calling to get an update on the authorization for the (lyrcia ) and discuss some new symptoms she's experiencing, patient states she low on the sample packs, pls call

## 2019-07-15 ENCOUNTER — PATIENT MESSAGE (OUTPATIENT)
Dept: NEUROLOGY | Facility: CLINIC | Age: 34
End: 2019-07-15

## 2019-07-18 ENCOUNTER — DOCUMENTATION ONLY (OUTPATIENT)
Dept: NEUROLOGY | Facility: CLINIC | Age: 34
End: 2019-07-18

## 2019-07-18 NOTE — PROGRESS NOTES
"PA from [employer] for Lyrica 75 mg "up to 2 capsules per day for the first week, then up to 2 capsules of 150 mg per day thereafter"; 98 capsules per month approved; Certification number: [id number]; Granted 7/3/2019-08/03/2019;   "

## 2019-07-27 ENCOUNTER — PATIENT MESSAGE (OUTPATIENT)
Dept: NEUROLOGY | Facility: CLINIC | Age: 34
End: 2019-07-27

## 2019-08-15 ENCOUNTER — TELEPHONE (OUTPATIENT)
Dept: PAIN MEDICINE | Facility: OTHER | Age: 34
End: 2019-08-15

## 2019-08-15 ENCOUNTER — PATIENT MESSAGE (OUTPATIENT)
Dept: NEUROLOGY | Facility: CLINIC | Age: 34
End: 2019-08-15

## 2019-08-15 NOTE — TELEPHONE ENCOUNTER
Called pt to touch base again about FRP. Unfortunately her  had a stroke in July, so she has been taking care of him. Encouraged her to reach out when/if the time is right for her to possibly do the program. We became disconnected during the call, so I called back and left a message with our contact #.

## 2019-08-30 ENCOUNTER — TELEPHONE (OUTPATIENT)
Dept: NEUROLOGY | Facility: CLINIC | Age: 34
End: 2019-08-30

## 2019-08-30 NOTE — TELEPHONE ENCOUNTER
----- Message from Mitali Esquivel sent at 8/30/2019 12:36 PM CDT -----  Contact: pt  Reason: Pt says her  is coming home from the hospital on 09/10/19 which is the same day as her appt. She ask if she can come first thing in the a.m or before 09/10/19?    Communication:[phone]

## 2019-09-16 ENCOUNTER — OFFICE VISIT (OUTPATIENT)
Dept: NEUROLOGY | Facility: CLINIC | Age: 34
End: 2019-09-16
Payer: COMMERCIAL

## 2019-09-16 DIAGNOSIS — F41.9 ANXIETY: ICD-10-CM

## 2019-09-16 DIAGNOSIS — M79.7 FIBROMYALGIA: ICD-10-CM

## 2019-09-16 DIAGNOSIS — G62.9 NEUROPATHY: Primary | ICD-10-CM

## 2019-09-16 PROCEDURE — 99215 OFFICE O/P EST HI 40 MIN: CPT | Mod: 95,,, | Performed by: PSYCHIATRY & NEUROLOGY

## 2019-09-16 PROCEDURE — 99215 PR OFFICE/OUTPT VISIT, EST, LEVL V, 40-54 MIN: ICD-10-PCS | Mod: 95,,, | Performed by: PSYCHIATRY & NEUROLOGY

## 2019-09-16 RX ORDER — PREGABALIN 200 MG/1
200 CAPSULE ORAL 2 TIMES DAILY
Qty: 60 CAPSULE | Refills: 6 | Status: SHIPPED | OUTPATIENT
Start: 2019-09-16 | End: 2020-05-21

## 2019-09-16 NOTE — Clinical Note
Please ask relay to her the following message:Try Vit B12 supplementation 1000mcg daily to see if it helps with cognition and with painful neuropathy in leg. It can be obtained over the counter.

## 2019-09-16 NOTE — Clinical Note
Michael Torres,This is the lady whose  had a stroke and now she can't get to her appointments due to lack of sitter for her . She does not have the multiple sclerosis; however, Are there any services for social support that we might be able to offer her

## 2019-09-24 ENCOUNTER — TELEPHONE (OUTPATIENT)
Dept: NEUROLOGY | Facility: CLINIC | Age: 34
End: 2019-09-24

## 2019-09-24 NOTE — PROGRESS NOTES
"Subjective:       Patient ID: Mary Fernandez is a 34 y.o. female who presents today for a routine clinic visit for multiple neurologic complaints, including MS rule out.      HPI (initially taken 7/1/19):  34 y.o. RH with h/o positive SARAH, atopic dermatitis, cerebellum meningioma, lumbosacral DDD, h/o endometriosis, fibromyalgia, headaches with cervicalgia, and chronic pain, who presents due to concern for MS. Referred by PCP, Dr. Strickland in [place]. Previously seen by neurologist, Dr. Yu, then second neurologist for pain and c/o headaches; neither was certain that she has MS. Also seen by rheumatology Dr. Zimmerman in 2016 and Dr. Christian in [place] in 4/2019; no specific autoimmune disease diagnosed. Then seen by Dr. Styles here and confirmed fibromyalgia. As for MS work-up, per chart review it appears that she has had MRI brain and c-spine. Brain imaging was quoted as "normal" and C-spine showed C6-7 foraminal stenosis  Symptoms have been gradual in onset for about 2-3 years. She cannot give timeline as she states that her memory is "zero". Poor memory, sometimes with word finding difficulty, and may forget how to spell simple words. Also having trouble remembering names Also with frequent headaches.  Reports having spells of "blanking out". Has had one spell where she was awake but unable to respond or react. This episode lasted maybe 10sec, but she's had other similar episodes in years past.  Constant falling, temperature dysregulation, right hand numbness x 1 week, generalized muscle pain/weakness, dropping things, decreased circulation in fingertips and s/t white or purple. Shooting/burning pain in her right foot.  Having difficulty standing from sitting position due to weakness and pain, falling (4x since January)  Urinary issues - Has has constant trickle and stress incontinence  Headaches - with eye pain, photophobia and phonophobia, and nausea. Will take tylenol or tramadol, taking this daily. Previously tried " "fioricet.  Has insomnia, taking otc sleep aid      SUBJECTIVE/TODAY:  Left foot is with severe pain and pressure. Slight swelling of ankles b/l.  Lyrica was helping but less so now.   With "chest pain" recently and went to ER. Placed on ativan and paxil resumed.   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 Currently at home. No aid to help her at all so she can leave for appts and lots of financial stressor, which is the reason why she hasn't been to any of her appts. No family or friends that can help.        SOCIAL HISTORY  Social History     Tobacco Use    Smoking status: Former Smoker    Smokeless tobacco: Never Used   Substance Use Topics    Alcohol use: Yes     Frequency: Never     Drinks per session: 1 or 2     Binge frequency: Never    Drug use: Never     Living arrangements - the patient lives with their family.  Employment none    ROS   Review of systems:   14 point ROS was negative except as described above.      Current Outpatient Medications on File Prior to Visit   Medication Sig Dispense Refill Last Dose    albuterol (PROAIR HFA) 90 mcg/actuation inhaler Take by mouth.   Not Taking    clonazePAM (KLONOPIN) 1 MG tablet Take 1 mg by mouth 3 (three) times daily as needed.   Taking    dupilumab (DUPIXENT) 300 mg/2 mL Syrg Inject 300 mg into the skin.   Taking    fluticasone propionate (FLONASE ALLERGY RELIEF) 50 mcg/actuation nasal spray 1 spray by Each Nare route once daily.   Taking    hydroxychloroquine (PLAQUENIL) 200 mg tablet Take 1 tablet (200 mg total) by mouth 2 (two) times daily. 60 tablet 6 Taking    Lactobacillus rhamnosus GG (CULTURELLE) 10 billion cell capsule Take 1 capsule by mouth once daily.   Taking    levocetirizine (XYZAL) 5 MG tablet Take 5 mg by mouth once daily.   Taking    omeprazole (PRILOSEC) 40 MG capsule Take 1 capsule (40 mg total) by mouth once daily. 10 capsule 0     predniSONE (DELTASONE) 20 MG tablet 60mg daily x 5 days, then 50mg x 1 day, then 40mg " x 1 day, then 30mg x 1 day, then 20mg x 1 day, then 10mg x 1 day. 23 tablet 0     traMADol (ULTRAM) 50 mg tablet Take 1 tablet (50 mg total) by mouth every 12 (twelve) hours as needed for Pain. 60 tablet 0 Taking             Objective:      Formal exam deferred due to televisit.  Neurologic Exam      MENTAL STATUS: grossly intact. Normal language, attention, and memory.   CRANIAL NERVE EXAM: Extraocular muscles are intact.  No facial asymmetry. There is no dysarthria.   MOTOR EXAM: Moving all extremities equally.  COORDINATION: No apparent ataxia or dysmetria with gross movement  GAIT: stable/steady gait as she walked through house with camera      Imaging:     MRI Brain w/wo 3/2019: Unchanged midline tentorial meningioma (19 x 18 x 14mm) with no evidence of mass effect. No other enhancement. 11/2017:  Midline tentorial dural based homogeneously enhancing 2 x2 x1.5cm mass. No abnormal signal intensities in the parenchyma. No other enhancing abnormalities.     MRI C-Spine w/wo 4/2019, 2/2018: Normal cord signal. No significant canal or foraminal narrowing. 4/2016: s/p post discectomy and fusion at C5-6, no residual stenosis     MRI L-Spine w/o 4/2019: Normal lordosis. No significant change since 3/2018. No significant canal or neuroforaminal stenosis. No disc herniations. 3/2018 No significant change compared to 4/4/16     MRV 3/2019: Dural venous sinuses patent         Labs:     Data:   Laboratory:   6/2019 serum: negative/normal - esr, crp, b2 glycoprotein, drvvt, cardiolipin ab, HLA B27, CK, lyme ab, aldolase, ck, myositis assessR plus Kassy-1  Weakly positive myomarker panel 3.  7/2019: MMA 0.15, tryptase 2.3, JESUS wnl, SPEP wnl, tsh/free T4 wnl, vit B12 342, vit D 33, ESR 15, folate 11.1    No results found for: WBC, RBC, HGB, HCT, MCV, MCH, MCHC, RDW, PLT, MPV, GRAN, LYMPH, MONO, EOS, BASO, EOSINOPHIL, BASOPHIL    Chemistry    No results found for: NA, K, CL, CO2, BUN, CREATININE, GLU No results found for:  CALCIUM, ALKPHOS, AST, ALT, BILITOT, ESTGFRAFRICA, EGFRNONAA             Diagnosis/Assessment/Plan:    1. Painful paresthesias   · Assessment: Worse on today although initially improved with Lyrica. Stress has significantly increased since last visit given her 's stroke and lack of social support. Still with concern for fibromyalgia although would like to rule out any possible compressive neuropathy/radiculopathy.   · Increase Lyrica to 200mg bid for pain control.  · Counseled on stress and coping. Discussed [facility] as they can even come to patient's home for counseling. Also discussed other options to help obtain more services for  so that she can get to her appointments. Referral for social work placed to see if any possible services are available to help her.  · EMG still pending.  · Still needs to follow up with her rheumatologist.      2. Cognitive complaints  ? Assessment: MS mimickers negative, including metabolic and nutritious causes, although B12 was a little low normal range. Other consideration is that of fibromyalgia contributing to fogginess.   ? Imaging: no new imaging  ? Labs: Vit B12 supplementation 1000mcg daily recommended  ? VEP's pending as well.     3. Analgesic overuse, intractable migraine w/o aura  ? Extensive counseling previously given for both.  ? No complaints on today. Deferred until next visit    F/u in 2-3 months after testing complete    Over 50% of this 40 minute visit was spent in direct face to face counseling of the patient about diagnosis, further w/u, and possible symptom management.         Neuropathy  -     pregabalin (LYRICA) 200 MG Cap; Take 1 capsule (200 mg total) by mouth 2 (two) times daily.  Dispense: 60 capsule; Refill: 6    Fibromyalgia  -     pregabalin (LYRICA) 200 MG Cap; Take 1 capsule (200 mg total) by mouth 2 (two) times daily.  Dispense: 60 capsule; Refill: 6

## 2019-09-24 NOTE — TELEPHONE ENCOUNTER
----- Message from Merrill Mcghee MA sent at 9/24/2019 11:27 AM CDT -----      ----- Message -----  From: Carmel Pino MD  Sent: 9/23/2019   7:57 PM CDT  To: Merrill Mcghee MA    Please ask relay to her the following message:  Try Vit B12 supplementation 1000mcg daily to see if it helps with cognition and with painful neuropathy in leg. It can be obtained over the counter.

## 2019-09-24 NOTE — TELEPHONE ENCOUNTER
Attempted to contact pt in regards to her cognition, and leg neuropathy. Left VM requesting pt call back.

## 2019-09-24 NOTE — TELEPHONE ENCOUNTER
Spoke with pt, and recommended that the pt start a B12 supplementation, 1000mcg daily, to help with her cognition and painful leg neuropathy, per Dr. Carmel Pino request.

## 2019-09-27 ENCOUNTER — TELEPHONE (OUTPATIENT)
Dept: PSYCHIATRY | Facility: CLINIC | Age: 34
End: 2019-09-27

## 2019-09-27 ENCOUNTER — OFFICE VISIT (OUTPATIENT)
Dept: RHEUMATOLOGY | Facility: CLINIC | Age: 34
End: 2019-09-27
Payer: COMMERCIAL

## 2019-09-27 VITALS
SYSTOLIC BLOOD PRESSURE: 120 MMHG | DIASTOLIC BLOOD PRESSURE: 77 MMHG | BODY MASS INDEX: 30.93 KG/M2 | HEIGHT: 67 IN | WEIGHT: 197.06 LBS | HEART RATE: 87 BPM

## 2019-09-27 DIAGNOSIS — Z71.89 COUNSELING ON HEALTH PROMOTION AND DISEASE PREVENTION: ICD-10-CM

## 2019-09-27 DIAGNOSIS — R76.8 ANA POSITIVE: ICD-10-CM

## 2019-09-27 DIAGNOSIS — M67.90 TENDINOPATHY: Primary | ICD-10-CM

## 2019-09-27 PROCEDURE — 99999 PR PBB SHADOW E&M-EST. PATIENT-LVL III: CPT | Mod: PBBFAC,,, | Performed by: INTERNAL MEDICINE

## 2019-09-27 PROCEDURE — 3008F BODY MASS INDEX DOCD: CPT | Mod: CPTII,S$GLB,, | Performed by: INTERNAL MEDICINE

## 2019-09-27 PROCEDURE — 99214 OFFICE O/P EST MOD 30 MIN: CPT | Mod: S$GLB,,, | Performed by: INTERNAL MEDICINE

## 2019-09-27 PROCEDURE — 99999 PR PBB SHADOW E&M-EST. PATIENT-LVL III: ICD-10-PCS | Mod: PBBFAC,,, | Performed by: INTERNAL MEDICINE

## 2019-09-27 PROCEDURE — 99214 PR OFFICE/OUTPT VISIT, EST, LEVL IV, 30-39 MIN: ICD-10-PCS | Mod: S$GLB,,, | Performed by: INTERNAL MEDICINE

## 2019-09-27 PROCEDURE — 3008F PR BODY MASS INDEX (BMI) DOCUMENTED: ICD-10-PCS | Mod: CPTII,S$GLB,, | Performed by: INTERNAL MEDICINE

## 2019-09-27 RX ORDER — PAROXETINE HYDROCHLORIDE 20 MG/1
20 TABLET, FILM COATED ORAL EVERY MORNING
COMMUNITY
End: 2022-07-15 | Stop reason: ALTCHOICE

## 2019-09-27 NOTE — PROGRESS NOTES
RHEUMATOLOGY OUTPATIENT CLINIC NOTE    9/27/2019    Attending Rheumatologist: Diallo Mcgovern  Primary Care Provider: Robyn Strickland MD   Physician Requesting Consultation: No referring provider defined for this encounter.  Chief Complaint/Reason For Consultation:  Fibromyalgia (left pain and swelling)    Subjective:       HPI  Mary Fernandez is a 34 y.o. White female with positive SARAH and fibromyalgia comes for follow-up.    Today:  Last seen by Rheumatology on June.  Recommended for blood work and imaging, as well as Plaquenil trial.  Denies significant response to arthralgias while on Plaquenil.  Main complaint is left ankle joint pain.  Worst in the evening, aggravated by range of motion/weight bearing, relieved somewhat by rest.  Refers subjective joint swelling but denies association with prolonged morning stiffness, or redness.    Review of Systems   Constitutional: Positive for malaise/fatigue. Negative for chills and fever.   Eyes: Negative for pain and redness.   Respiratory: Negative for cough, hemoptysis and shortness of breath.    Cardiovascular: Negative for chest pain and leg swelling.   Gastrointestinal: Negative for abdominal pain, blood in stool and melena.   Genitourinary: Negative for dysuria and hematuria.   Musculoskeletal: Positive for joint pain (Left ankle, mechanical pattern). Negative for falls.   Skin: Positive for rash (Atopic dermatitis. Chronic.).   Neurological: Positive for weakness. Negative for tingling and focal weakness.   Psychiatric/Behavioral: Positive for memory loss. The patient has insomnia.        Chronic comorbid conditions affecting medical decision making today:  Past Medical History:   Diagnosis Date    ADHD (attention deficit hyperactivity disorder)     Allergy     Anxiety     Atopic dermatitis 2018    Brain tumor 2008    Endometriosis     GERD (gastroesophageal reflux disease)     IC (interstitial cystitis)     Obsessive-compulsive disorder     Seizures     · Positive SARAH    Past Surgical History:   Procedure Laterality Date    AUGMENTATION OF BREAST      CERVICAL FUSION      HYSTERECTOMY      LAPAROSCOPIC DRAINAGE OF CYST OF OVARY      NASAL SEPTOPLASTY  2007     Family History   Problem Relation Age of Onset    Drug abuse Mother     Hypertension Mother     Heart disease Father     Hypertension Father     Alcohol abuse Father     Early death Father      Social History     Substance and Sexual Activity   Alcohol Use Yes    Frequency: Never    Drinks per session: 1 or 2    Binge frequency: Never     Social History     Tobacco Use   Smoking Status Former Smoker   Smokeless Tobacco Never Used     Social History     Substance and Sexual Activity   Drug Use Never       Current Outpatient Medications:     albuterol (PROAIR HFA) 90 mcg/actuation inhaler, Take by mouth., Disp: , Rfl:     clonazePAM (KLONOPIN) 1 MG tablet, Take 1 mg by mouth 3 (three) times daily as needed., Disp: , Rfl:     dupilumab (DUPIXENT) 300 mg/2 mL Syrg, Inject 300 mg into the skin. Every other week, Disp: , Rfl:     fluticasone propionate (FLONASE ALLERGY RELIEF) 50 mcg/actuation nasal spray, 1 spray by Each Nare route once daily., Disp: , Rfl:     hydroxychloroquine (PLAQUENIL) 200 mg tablet, Take 1 tablet (200 mg total) by mouth 2 (two) times daily., Disp: 60 tablet, Rfl: 6    Lactobacillus rhamnosus GG (CULTURELLE) 10 billion cell capsule, Take 1 capsule by mouth once daily., Disp: , Rfl:     paroxetine (PAXIL) 20 MG tablet, Take 20 mg by mouth every morning., Disp: , Rfl:     pregabalin (LYRICA) 200 MG Cap, Take 1 capsule (200 mg total) by mouth 2 (two) times daily., Disp: 60 capsule, Rfl: 6    traMADol (ULTRAM) 50 mg tablet, Take 1 tablet (50 mg total) by mouth every 12 (twelve) hours as needed for Pain., Disp: 60 tablet, Rfl: 0    levocetirizine (XYZAL) 5 MG tablet, Take 5 mg by mouth once daily., Disp: , Rfl:     omeprazole (PRILOSEC) 40 MG capsule, Take 1 capsule  "(40 mg total) by mouth once daily., Disp: 10 capsule, Rfl: 0    predniSONE (DELTASONE) 20 MG tablet, 60mg daily x 5 days, then 50mg x 1 day, then 40mg x 1 day, then 30mg x 1 day, then 20mg x 1 day, then 10mg x 1 day., Disp: 23 tablet, Rfl: 0     Objective:         Vitals:    09/27/19 1545   BP: 120/77   Pulse: 87     Physical Exam   Constitutional: No distress.   Estimated body mass index is 31.33 kg/m² as calculated from the following:    Height as of this encounter: 5' 6.5" (1.689 m).    Weight as of this encounter: 89.4 kg (197 lb 1.5 oz).    Wt Readings from Last 1 Encounters:  09/27/19 1545 : 89.4 kg (197 lb 1.5 oz)     HENT:   Head: Normocephalic and atraumatic.   Eyes: Conjunctivae are normal. Pupils are equal, round, and reactive to light.   Neck: Normal range of motion.   Cardiovascular: Normal rate and intact distal pulses.    Pulmonary/Chest: Effort normal. No respiratory distress.   Abdominal: Soft. She exhibits no distension.   Neurological: She is alert. Gait normal.   Skin: No rash noted. No erythema.     Musculoskeletal: Normal range of motion. She exhibits tenderness (Left plantar fascia, posterior tibialis tendon area.).   : strong  No synovitis or significant squeeze tenderness    AROM: intact  PROM: intact    Devices used by patient: none       Reviewed old and all outside pertinent medical records available.    All lab results personally reviewed and interpreted by me.  No results found for: WBC, HGB, HCT, MCV, MCH, MCHC, RDW, PLT, MPV, NEUTROABS, LYMPHOABS, MONOABS, EOSINOABS, BASOSABS, NEUTROPCT, LYMPHOPCT, MONOPCT, EOSINOPCT, BASOPCT, DIFFTYPE, RBCMORPHOLOG, PLTEST    No results found for: NA, K, CL, CO2, GLU, BUN, LABCREA, CALCIUM, PROT, ALBUMIN, BILITOT, AST, ALKPHOS, ALT, GFRAA, GFRNONAA    No results found for: COLORU, APPEARANCEUA, SPECGRAV, PHUR, PROTEINUA, GLUCOSEU, KETONESU, BLOODU, LEUKOCYTESUR, NITRITE, UROBILINOGEN    Lab Results   Component Value Date    CRP 2.7 06/27/2019 "       Lab Results   Component Value Date    SEDRATE 15 07/01/2019       Lab Results   Component Value Date    SEDRATE 15 07/01/2019       No components found for: 25OHVITDTOT, 18SEPWAL5, 70HZOPCM3, METHODNOTE    No results found for: URICACID    No components found for: TSPOTTB    Rheum Labs:   SARAH 1 in 160 homogeneous   SANTIAGO negative   Rheumatoid factor, CCP negative   HLA B27 negative   Antiphospholipid syndrome panel negative   Myomarker panel 1, 3: SSA 23 (ref 20), rest of panel WNL     Infectious Labs:   n/a     Imaging:  All imaging reviewed and independently  interpreted by me.    X-ray hands June 2019  Joint spaces are maintained.  No fracture or dislocation.  No osseous erosions.  No osteopenia.  Soft tissues are within normal limits.    X-ray foot June 2019  No acute fracture, subluxation, or dislocation.  No osseous erosions.  Soft tissues are within normal limits.    X-ray ankles June 2019  No acute fracture, subluxation, or dislocation.  Talar dome is intact.  Soft tissues appear normal.     ASSESSMENT / PLAN:     Mary Fernandez is a 34 y.o. White female with:    1. 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  - findings consistent with plantar fasciitis and posterior tibialis tendinosis on exam.  - XR Imaging unremarkable.  Acute phase reactants within normal range.  - Discussed and recommended range of motion, flexibility, and strengthening exercises   - resting affected joint for brief periods (<12 h)   - stretching, massage, ice PRN  - Consider soft shoes/insoles  - Acetaminophen prn -> standing up to 3 g per day  - Topicals therapy: Capsaicin  - consider for corticosteroid injection if refractory    2. Positive SARAH  - no features of active rheumatic disorder.  Rest of autoimmune workup unrevealing.  - currently on trial of Plaquenil.  No significant response reported by patient  - continue with local management for sicca  symptoms.    3. Other specified counseling  - Nutrition and exercise counseling.  - Limitation of alcohol consumption.  - Regular exercise:  Aerobic and resistance.  - Medication counseling provided.    Follow up in about 1 month (around 10/27/2019).    Method of contact with patient concerns: [facility]    Disclaimer:  This note is prepared using voice recognition software and as such is likely to have errors and has not been proof read. Please contact me for questions.     Time spent: 25 minutes in face to face discussion concerning diagnosis, prognosis, review of lab and test results, benefits of treatment as well as management of disease, counseling of patient and coordination of care between various health care providers.  Greater than half the time spent was used for coordination of care and counseling of patient.    Diallo Mcgovern M.D.  Rheumatology Department   [facility]

## 2019-09-27 NOTE — TELEPHONE ENCOUNTER
SW intern spoke with pt's  by phone. SW intern encouraged pt's  to have pt call this SW intern back regarding aid assistance options.

## 2019-09-27 NOTE — PATIENT INSTRUCTIONS
Ankle Dorsiflexion/Plantarflexion (Flexibility)    1. Sit on the floor or in bed with your legs straight in front of you.  2. Point both feet. Then flex both feet.  3. Do this 10 to 30 times in a row.  4. Repeat this exercise 2 times a day, or as instructed.  Date Last Reviewed: 5/1/2016 © 2000-2017 [employer]. [address] [place] [address]. All rights reserved. This information is not intended as a substitute for professional medical care. Always follow your healthcare professional's instructions.        Plantar Fasciitis  Plantar fasciitis is a painful swelling of the plantar fascia. The plantar fascia is a thick, fibrous layer of tissue. It covers the bones on the bottom of your foot. And it supports the foot bones in an arched position.  This can happen gradually or suddenly. It usually affects one foot at a time. Heel pain can be sharp, like a knife sticking into the bottom of your foot. You may feel pain after exercising, long-distance jogging, stair climbing, long periods of standing, or after standing up.  Risk factors include: non-active lifestyle, arthritis, diabetes, obesity or recent weight gain, flat foot, high arch. Wearing high heels, loose shoes, or shoes with poor arch support for long periods of time adds to the risk. This problem is commonly found in runners and dancers. It also found in people who stand on hard surfaces for long periods of time.  Foot pain from this condition is usually worse in the morning. But it improves with walking. By the end of the day there may be a dull aching. Treatment requires short-term rest and controlling swelling. It may take up to 9 months before all symptoms go away. Rarely, a steroid injection into the foot, or surgery, may be needed.  Home care  · If you are overweight, lose weight to help healing.  · Choose supportive shoes with good arch support and shock absorbency. Replace athletic shoes when they become worn out. Dont walk  or run barefoot.  · Premade or custom-fitted shoe inserts may be helpful. Inserts made of silicone seem to be the most effective. Custom-made inserts can be provided by a podiatrist or foot specialist, physical therapist, or orthopedist.  · Premade or custom-made night splints keep the heel stretched out while you sleep. They may prevent morning pain.  · Avoid activities that stress the feet: jogging, prolonged standing or walking, contact sports, etc.  · First thing in the morning and before sports, stretch the bottom of your feet. Gently flex your ankle so the toes move toward your knee.  · Icing may help control heel pain. Apply an ice pack to the heel for 10-20 minutes as a preventive. Or ice your heel after a severe flare-up of symptoms. You may repeat this every 1-2 hours as needed.  · You may use over-the-counter pain medicine to control pain, unless another medicine was prescribed. Anti-inflammatory pain medicines, such as ibuprofen or naproxen, may work better than acetaminophen. If you have chronic liver or kidney disease or ever had a stomach ulcer or GI bleeding, talk with your healthcare provider before using these medicines.  Follow-up care  Follow up with your healthcare provider, physical therapist, or podiatrist or foot specialist as advised.  Call for an appointment if pain worsens or there is no relief after a few weeks of home treatment. Shoe inserts, a night splint, or a special boot may be required.  If X-rays were taken, you will be told of any new findings that may affect your care.  When to seek medical advice  Call your healthcare provider right away if any of these occur:  · Foot swelling  · Redness with increasing pain  Date Last Reviewed: 11/21/2015  © 4853-8844 [employer]. [address] [place] [address], [place] [address]. All rights reserved. This information is not intended as a substitute for professional medical care. Always follow your healthcare professional's  instructions.        Treating Plantar Fasciitis  First, your healthcare provider tries to determine the cause of your problem in order to suggest ways to relieve pain. If your pain is due to poor foot mechanics, custom-made shoe inserts (orthoses) may help.    Reduce symptoms  · To relieve mild symptoms, try aspirin, ibuprofen, or other medicines as directed. Rubbing ice on the affected area may also help.  · To reduce severe pain and swelling, your healthcare provider may prescribe pills or injections or a walking cast in some instances. Physical therapy, such as ultrasound or a daily stretching program, may also be recommended. Surgery is rarely required.  · To reduce symptoms caused by poor foot mechanics, your foot may be taped. This supports the arch and temporarily controls movement. Night splints may also help by stretching the fascia.  Control movement  If taping helps, your healthcare provider may prescribe orthoses. Built from plaster casts of your feet, these inserts control the way your foot moves. As a result, your symptoms should go away.  Reduce overuse  Every time your foot strikes the ground, the plantar fascia is stretched. You can reduce the strain on the plantar fascia and the possibility of overuse by following these suggestions:  · Lose any excess weight.  · Avoid running on hard or uneven ground.  · Use orthoses at all times in your shoes and house slippers.  If surgery is needed  Your healthcare provider may consider surgery if other types of treatment don't control your pain. During surgery, the plantar fascia is partially cut to release tension. As you heal, fibrous tissue fills the space between the heel bone and the plantar fascia.   Date Last Reviewed: 10/14/2015  © 8329-7135 [employer]. [address] [place] [address], [place] [address]. All rights reserved. This information is not intended as a substitute for professional medical care. Always follow your healthcare professional's  instructions.        Understanding Plantar Fasciitis    Plantar fasciitis is a condition that causes foot and heel pain. The plantar fascia is a tough band of tissue that runs across the bottom of the foot from the heel to the toes. This tissue pulls on the heel bone. It supports the arch of the foot as it pushes off the ground. If the tissue becomes irritated or red and swollen (inflamed), it is called plantar fasciitis.  How to say it  PLAN-tuhr fa-see-IY-tis   What causes plantar fasciitis?  Plantar fasciitis most often occurs from overusing the plantar fascia. The tissue may become damaged from activities that put repeated stress on the heel and foot. Or it may wear down over time with age and ankle stiffness. You are more likely to have plantar fasciitis if you:  · Do activities that require a lot of running, jumping, or dancing  · Have a job that requires being on your feet for long periods  · Are overweight or obese  · Have certain foot problems, such as a tight Achilles tendon, flat feet, or high arches  · Often wear poorly fitting shoes  Symptoms of plantar fasciitis  The condition most often causes pain in the heel and the bottom of the foot. The pain may occur when you take your first steps in the morning. It may get better as you walk throughout the day. But as you continue to put weight on the foot, the pain often returns. Pain may also occur after standing or sitting for long periods.  Treating plantar fasciitis  Treatments for plantar fasciitis include:  · Resting the foot. This involves limiting movements that make your foot hurt. You may also need to avoid certain sports and types of work for a time.  · Using cold packs. Put an ice pack on the heel and foot to help reduce pain and swelling.  · Taking pain medicines. Prescription and over-the-counter pain medicines can help relieve pain and swelling.  · Using heel cups or foot inserts (orthotics). These are placed in the shoes to help support the  heel or arch and cushion the heel. You may also be told to buy proper-fitting shoes with good arch support and cushioned soles.  · Taping the foot. This supports the arch and limits the movement of the plantar fascia to help relieve symptoms.  · Wearing a night splint. This stretches the plantar fascia and leg muscles while you sleep. This may help relieve pain.  · Doing exercises and physical therapy. These stretch and strengthen the plantar fascia and the muscles in the leg that support the heel and foot.  · Getting shots of medicine into the foot. These may help relieve symptoms for a time.  · Having surgery. This may be needed if other treatments fail to relieve symptoms. During surgery, the surgeon may partially cut the plantar fascia to release tension.  Possible complications of plantar fasciitis  Without proper care and treatment, healing may take longer than normal. Also, symptoms may continue or get worse. Over time, the plantar fascia may be damaged. This can make it hard to walk or even stand without pain.  When to call your healthcare provider  Call your healthcare provider right away if you have any of these:  · Fever of 100.4°F (38°C) or higher, or as directed  · Symptoms that dont get better with treatment, or get worse  · New symptoms, such as numbness, tingling, or weakness in the foot   Date Last Reviewed: 3/10/2016  © 3234-5861 [employer]. [address] [place] [address]. All rights reserved. This information is not intended as a substitute for professional medical care. Always follow your healthcare professional's instructions.        Arch retraining    These exercises are for your right foot. Switch sides for your left foot.  5. Sit in a chair or stand with both feet flat on the floor. Press down with the ball of your right foot, but only on the left side of the foot, just under the big toe.  6. Then pull the bottom of your big toe back toward your heel. This should  pull up the arch of your foot. Dont flex your toes while doing this. It is a subtle movement of the arch.  7. Hold for 5 seconds. Relax.  Date Last Reviewed: 3/10/2016  © 6736-2378 [employer]. [address] [place] [address]. All rights reserved. This information is not intended as a substitute for professional medical care. Always follow your healthcare professional's instructions.        Understanding Posterior Tibialis Tenosynovitis    The posterior tibialis tendon runs along the inside of the foot. It connects the calf muscle (posterior tibialis muscle) to bones on the inside of the foot. It helps maintain the arch of the foot. It also gives you stability when you move. Posterior tibialis tenosynovitis is when this tendon becomes inflamed or torn.     How to say it  CQA-gu-p-lis ten-o-sin-o-VI-tis   What causes posterior tibialis tenosynovitis?  This condition is often caused by an injury to the tendon. For instance, a fall can tear the tendon. Overuse can also damage it. People who play sports like basketball or tennis a lot may weaken the tendon over time.  Symptoms of posterior tibialis tenosynovitis  The symptoms of this condition include pain and swelling. The pain is usually felt near the tendon, on the inside of the foot and ankle. It often gets worse over time or with an increase in activity. Your arch may eventually fall, leading to a flat foot. Your foot may also start to turn outward.  Treatment for posterior tibialis tenosynovitis  Treatment for this condition depends on the severity of the tear. Treatment may include:  · Rest. You should avoid any activities that cause pain and swelling. You may also need to limit the amount of weight you put on your injured foot.  · Cold packs. Putting a cold pack on the tendon may reduce pain and swelling.  · Medicine. Over-the-counter pain medicines can reduce pain and swelling.  · Leg cast or walking boot. Severe tears of the posterior  tibialis tendon may need to be kept from moving. These devices can help protect the tendon and reduce swelling.  · Shoe insert or brace. Like a leg cast or walking boot, these devices can help protect the tendon as it heals. They may also ease pain.  · Strengthening and stretching exercises. Certain exercises can help you regain strength and flexibility in your foot..  · Surgery. Several surgical choices are available to fix the torn tendon or replace it. But you often do not need surgery unless your symptoms do not get better after trying other treatments for at least 6 months.     When to call your healthcare provider  Call your healthcare provider right away if you have any of these:  · Fever of 100.4°F (38°C) or higher, or as directed  · Pain that gets worse  · Symptoms that dont get better, or get worse  · New symptoms    Date Last Reviewed: 3/10/2016  © 0787-0794 [employer]. [address] [place] [address]. All rights reserved. This information is not intended as a substitute for professional medical care. Always follow your healthcare professional's instructions.        Soleus Stretch (Flexibility)    8. Stand facing a wall from 3 feet away. Take one step toward the wall with your right foot.  9. Place both palms on the wall. Bend both knees and lean forward. Keep both heels on the floor.  10. Hold for 30 to 60 seconds. Then relax both legs. Repeat the exercise 2 times.  11. Switch legs and repeat.  12. Repeat this exercise 3 times a day, or as instructed.     Tip: Dont bounce while youre stretching.   Date Last Reviewed: 3/10/2016  © 4325-8144 [employer]. [address] [place] [address]. All rights reserved. This information is not intended as a substitute for professional medical care. Always follow your healthcare professional's instructions.

## 2019-10-07 ENCOUNTER — PATIENT MESSAGE (OUTPATIENT)
Dept: PSYCHIATRY | Facility: CLINIC | Age: 34
End: 2019-10-07

## 2019-10-14 ENCOUNTER — TELEPHONE (OUTPATIENT)
Dept: NEUROLOGY | Facility: CLINIC | Age: 34
End: 2019-10-14

## 2019-10-14 NOTE — TELEPHONE ENCOUNTER
"Returned call. Pt states she is having a new sensation that started yesterday and tends to come and go. She said it feels as though "ice cold rain drops" are falling on her right arm and the right, top of her arms. Pt denies any recent infections., but is under a lot of stress.   "

## 2019-10-14 NOTE — TELEPHONE ENCOUNTER
----- Message from Shiloh Lopez sent at 10/14/2019 12:38 PM CDT -----  Contact: Patient  Type: Needs Medical Advice    Who Called:  Patient  Symptoms (please be specific): States that she is experiencing new symptoms  How long has patient had these symptoms:  A few days  Pharmacy name and phone #:    [id number] [address] [address]  Phone: [phone] Fax: [phone]     Best Call Back Number: [phone] (home)    Additional Information: na

## 2019-10-20 ENCOUNTER — DOCUMENTATION ONLY (OUTPATIENT)
Dept: NEUROLOGY | Facility: CLINIC | Age: 34
End: 2019-10-20

## 2019-10-20 NOTE — PROGRESS NOTES
OSH Records from [facility]. Highlights documented here. Will be scanned into record later.    MRV 3/2019: Dural venous sinuses patent.  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 No acute changes. The bilateral orbits are normal.  MRI C-Spine w/wo 4/2019: normal signal in spinal cord. Mild left neuroforaminal stenosis at C6-7. No disc herniations.  MRI L-Spine w/wo 4/2019: no significant canal or neuroforaminal stenosis. No significant change compared to 3/2018  MRI Right foot 8/2018: unremarkable. No focal swelling, fascia appears intact, and tendons are intact.

## 2019-10-25 NOTE — TELEPHONE ENCOUNTER
Pt states still come and go. Pt will keep a diary of symptoms for the next week, then call us with an update.

## 2019-11-12 RX ORDER — TRAMADOL HYDROCHLORIDE 50 MG/1
50 TABLET ORAL EVERY 12 HOURS PRN
Qty: 60 TABLET | Refills: 0 | OUTPATIENT
Start: 2019-11-12

## 2019-11-12 RX ORDER — TRAMADOL HYDROCHLORIDE 50 MG/1
TABLET ORAL
Qty: 60 TABLET | Refills: 0 | OUTPATIENT
Start: 2019-11-12

## 2020-04-25 DIAGNOSIS — M35.9 UNDIFFERENTIATED CONNECTIVE TISSUE DISEASE: ICD-10-CM

## 2020-04-27 RX ORDER — HYDROXYCHLOROQUINE SULFATE 200 MG/1
TABLET, FILM COATED ORAL
Qty: 60 TABLET | Refills: 6 | OUTPATIENT
Start: 2020-04-27

## 2020-06-17 ENCOUNTER — PATIENT OUTREACH (OUTPATIENT)
Dept: ADMINISTRATIVE | Facility: HOSPITAL | Age: 35
End: 2020-06-17

## 2020-10-06 ENCOUNTER — PATIENT MESSAGE (OUTPATIENT)
Dept: ADMINISTRATIVE | Facility: HOSPITAL | Age: 35
End: 2020-10-06

## 2021-03-25 ENCOUNTER — PATIENT MESSAGE (OUTPATIENT)
Dept: ADMINISTRATIVE | Facility: HOSPITAL | Age: 36
End: 2021-03-25

## 2021-04-22 ENCOUNTER — TELEPHONE (OUTPATIENT)
Dept: NEUROLOGY | Facility: CLINIC | Age: 36
End: 2021-04-22

## 2021-04-28 ENCOUNTER — HISTORICAL (OUTPATIENT)
Dept: ADMINISTRATIVE | Facility: HOSPITAL | Age: 36
End: 2021-04-28

## 2021-04-28 LAB
ANTINUCLEAR ANTIBODY SCREEN (OHS): NEGATIVE
CENTROMERE PROTEIN ANTIBODY (OHS): NEGATIVE
DSDNA ANTIBODY (OHS): NEGATIVE
HBV SURFACE AG SERPL QL IA: NONREACTIVE
HCV AB SERPL QL IA: NONREACTIVE
JO-1 ANTIBODY (OHS): NEGATIVE
RNP70 ANTIBODY (OHS): NEGATIVE
SCLERODERMA (SCL-70S) ANTIBODY (OHS): NEGATIVE
SMITH DP IGG (OHS): NEGATIVE
SSA(RO) ANTIBODY (OHS): NEGATIVE
SSB(LA) ANTIBODY (OHS): NEGATIVE
U1RNP ANTIBODY (OHS): NEGATIVE

## 2021-05-12 ENCOUNTER — PATIENT MESSAGE (OUTPATIENT)
Dept: RESEARCH | Facility: HOSPITAL | Age: 36
End: 2021-05-12

## 2021-06-24 ENCOUNTER — TELEPHONE (OUTPATIENT)
Dept: PAIN MEDICINE | Facility: CLINIC | Age: 36
End: 2021-06-24

## 2021-11-29 ENCOUNTER — HISTORICAL (OUTPATIENT)
Dept: SURGERY | Facility: HOSPITAL | Age: 36
End: 2021-11-29

## 2022-03-11 ENCOUNTER — HISTORICAL (OUTPATIENT)
Dept: ADMINISTRATIVE | Facility: HOSPITAL | Age: 37
End: 2022-03-11

## 2022-03-11 LAB
FERRITIN SERPL-MCNC: 99.43 NG/ML (ref 4.63–204)
IRON SERPL-MCNC: 90 UG/DL (ref 50–170)

## 2022-03-16 ENCOUNTER — HISTORICAL (OUTPATIENT)
Dept: ADMINISTRATIVE | Facility: HOSPITAL | Age: 37
End: 2022-03-16

## 2022-03-16 LAB
ABS NEUT (OLG): 4.58 (ref 2.1–9.2)
ALBUMIN SERPL-MCNC: 4.2 G/DL (ref 3.5–5)
ALBUMIN/GLOB SERPL: 1.2 {RATIO} (ref 1.1–2)
ALP SERPL-CCNC: 70 U/L (ref 40–150)
ALT SERPL-CCNC: 28 U/L (ref 0–55)
AST SERPL-CCNC: 20 U/L (ref 5–34)
BASOPHILS # BLD AUTO: 0 10*3/UL (ref 0–0.2)
BASOPHILS NFR BLD AUTO: 1 %
BILIRUB SERPL-MCNC: 0.4 MG/DL
BILIRUBIN DIRECT+TOT PNL SERPL-MCNC: 0.1 (ref 0–0.5)
BILIRUBIN DIRECT+TOT PNL SERPL-MCNC: 0.3 (ref 0–0.8)
BUN SERPL-MCNC: 11.2 MG/DL (ref 7–18.7)
CALCIUM SERPL-MCNC: 9.7 MG/DL (ref 8.7–10.5)
CHLORIDE SERPL-SCNC: 106 MMOL/L (ref 98–107)
CO2 SERPL-SCNC: 24 MMOL/L (ref 22–29)
CREAT SERPL-MCNC: 0.78 MG/DL (ref 0.55–1.02)
CRP SERPL HS-MCNC: 0.16 MG/L
DEPRECATED CALCIDIOL+CALCIFEROL SERPL-MC: 27.3 NG/ML (ref 30–80)
EOSINOPHIL # BLD AUTO: 0.3 10*3/UL (ref 0–0.9)
EOSINOPHIL NFR BLD AUTO: 4 %
ERYTHROCYTE [DISTWIDTH] IN BLOOD BY AUTOMATED COUNT: 12.5 % (ref 11.5–17)
FOLATE SERPL-MCNC: 10.4 NG/ML (ref 7–31.4)
GLOBULIN SER-MCNC: 3.6 G/DL (ref 2.4–3.5)
GLUCOSE SERPL-MCNC: 83 MG/DL (ref 74–100)
HCT VFR BLD AUTO: 40.9 % (ref 37–47)
HEMOLYSIS INTERF INDEX SERPL-ACNC: 35
HGB BLD-MCNC: 14 G/DL (ref 12–16)
ICTERIC INTERF INDEX SERPL-ACNC: 0
LIPEMIC INTERF INDEX SERPL-ACNC: 4
LYMPHOCYTES # BLD AUTO: 1.8 10*3/UL (ref 0.6–4.6)
LYMPHOCYTES NFR BLD AUTO: 24 %
MANUAL DIFF? (OHS): NO
MCH RBC QN AUTO: 30.4 PG (ref 27–31)
MCHC RBC AUTO-ENTMCNC: 34.2 G/DL (ref 33–36)
MCV RBC AUTO: 88.9 FL (ref 80–94)
MONOCYTES # BLD AUTO: 0.7 10*3/UL (ref 0.1–1.3)
MONOCYTES NFR BLD AUTO: 9 %
NEUTROPHILS # BLD AUTO: 4.58 10*3/UL (ref 2.1–9.2)
NEUTROPHILS NFR BLD AUTO: 62 %
PLATELET # BLD AUTO: 287 10*3/UL (ref 130–400)
PMV BLD AUTO: 10.7 FL (ref 9.4–12.4)
POTASSIUM SERPL-SCNC: 4.3 MMOL/L (ref 3.5–5.1)
PROT SERPL-MCNC: 7.8 G/DL (ref 6.4–8.3)
RBC # BLD AUTO: 4.6 10*6/UL (ref 4.2–5.4)
SODIUM SERPL-SCNC: 142 MMOL/L (ref 136–145)
VIT B12 SERPL-MCNC: 1298 PG/ML (ref 213–816)
WBC # SPEC AUTO: 7.4 10*3/UL (ref 4.5–11.5)

## 2022-03-18 LAB
ANTINUCLEAR ANTIBODY SCREEN (OHS): NEGATIVE
CENTROMERE PROTEIN ANTIBODY (OHS): NEGATIVE
CENTROMERE QUANT (OHS): <0.4
DSDNA AB QUANT (OHS): 2
DSDNA ANTIBODY (OHS): NEGATIVE
JO-1 AB QUANT (OHS): <0.3
JO-1 ANTIBODY (OHS): NEGATIVE
RNP70 AB QUANT (OHS): <0.3
RNP70 ANTIBODY (OHS): NEGATIVE
SCL-70S AB QUANT (OHS): <0.6
SCLERODERMA (SCL-70S) ANTIBODY (OHS): NEGATIVE
SMITH AB QUANT (OHS): 1.4
SMITH DP IGG (OHS): NEGATIVE
SSA(RO) AB QUANT (OHS): <0.3
SSA(RO) ANTIBODY (OHS): NEGATIVE
SSB(LA) AB QUANT (OHS): <0.3
SSB(LA) ANTIBODY (OHS): NEGATIVE
U1RNP AB QUANT (OHS): 0.5
U1RNP ANTIBODY (OHS): NEGATIVE

## 2022-04-09 ENCOUNTER — HISTORICAL (OUTPATIENT)
Dept: ADMINISTRATIVE | Facility: HOSPITAL | Age: 37
End: 2022-04-09
Payer: MEDICAID

## 2022-04-27 ENCOUNTER — PATIENT MESSAGE (OUTPATIENT)
Dept: ADMINISTRATIVE | Facility: HOSPITAL | Age: 37
End: 2022-04-27
Payer: MEDICAID

## 2022-04-27 VITALS
HEIGHT: 66 IN | BODY MASS INDEX: 33.66 KG/M2 | DIASTOLIC BLOOD PRESSURE: 87 MMHG | SYSTOLIC BLOOD PRESSURE: 120 MMHG | WEIGHT: 209.44 LBS | OXYGEN SATURATION: 98 %

## 2022-04-30 NOTE — OP NOTE
DATE OF SURGERY:    11/29/2021    SURGEON:  Tomás Vazquez MD    PREOPERATIVE DIAGNOSIS:  Extensor pollicis longus tendon laceration right thumb.    POSTOPERATIVE DIAGNOSIS:  Extensor pollicis longus tendon laceration right thumb.    PROCEDURE:  Exploration right thumb with repair of extensor pollicis longus tendon.    INDICATION FOR PROCEDURE:  Mary Fernandez is a 36-year-old female who suffered a laceration to the dorsal aspect of her thumb.  She has no extension to the IP joint.  She presents for repair.    ANESTHESIA:  General.    COMPLICATIONS:  None.    PROCEDURE IN DETAIL:  The patient was placed under LMA anesthesia, prepped and draped in the usual sterile fashion.  The incision was opened proximally and distally using a 15 blade scalpel.  There was an obvious EPL tendon laceration in its entirety.  The ends were freed up using tenotomy scissors, and a running 4-0 FiberWire was used to create a repair.  After this was completed, the skin was closed using running 3-0 Monocryl.  We placed a thumb spica splint.  The tourniquet was released.  Fingers were pink at the end of the case.  I was scrubbed and present for the entire procedure.        ______________________________  MD DAVID Romero/SP  DD:  11/29/2021  Time:  01:44PM  DT:  11/29/2021  Time:  06:59PM  Job #:  [id number]

## 2022-04-30 NOTE — PROGRESS NOTES
"   Patient:   Mary Fernandez            MRN: [id number]            FIN: [id number]               Age:   32 years     Sex:  Female     :  1985   Associated Diagnoses:   Bilateral anterior knee pain; Obesity   Author:   Alex Herrera NP      Chief Complaint   2017 8:25 CDT        Bilat. knee pain        History of Present Illness   31 yo  female presents to ortho clinic for c/o bilateral anterior knee pain.  Reports that the pain to bilateral knees started "a few years ago" and has been getting progressively worse.  Denies any known injury or trauma.  Reports that the pain is "in the front of my knees" and admits that kneeling, bending or walking up/down stairs makes the pain worse.  Denies any numbness/tingling to bilateral LE.  Admits taking OTC medication as needed for pain with moderate relief.  Denies doing any type of ROM/strengthening exercises to bilateral LE.  No other complaints today.      Review of Systems   ROS reviewed as documented in chart      Health Status   Allergies:    Allergic Reactions (Selected)  Severe  Metoclopramide- Anaphylaxis.  Severity Not Documented  Gabapentin- C/o - a headache.  Ibuprofen- Shock.  Maxalt- Vomitus.  Morphine- Itching.  Penicillin V potassium- Rash.  Reglan- Paralysis of vertical movement.,    Allergies (7) Active Reaction  metoclopramide Anaphylaxis  gabapentin C/O - a headache  ibuprofen shock  Maxalt Vomitus  morphine Itching  penicillin V potassium Rash  Reglan Paralysis of vertical movement     Current medications:  (Selected)   Prescriptions  Prescribed  KlonoPIN 0.5 mg oral tablet: 0.5 mg = 1 tab(s), Oral, BID, PRN PRN anxiety, # 60 tab(s), 3 Refill(s)  Paxil 40 mg oral tablet: 40 mg = 1 tab(s), Oral, Daily, # 90 tab(s), 1 Refill(s), Pharmacy: [facility]/pharmacy #[id number]  Documented Medications  Documented  AZELASTINE 0.1% (137 MCG) SPRY: 1 spray(s), Both Nostrils, BID  CLONAZEPAM 1 MG TABLET: mg tab(s), Oral  [facility] FLUTICASONE PROP 50 MCG: PRN PRN " allergy symptoms  DIPHENHYDRAMINE 25 MG CAPSULE: 25 mg = 1 cap(s), Oral, TID  EPIPEN 2-STEPHANY 0.3 MG AUTO-INJCT:   FAMOTIDINE 20 MG TABLET: 20 mg = 1 tab(s), Oral, BID, PRN PRN ind  HYDROCORTISONE LUIS 0.2% CREAM:   HYDROXYZINE SOL 25 MG CAP: 25 mg = 1 cap(s), Oral, qPM  ORPHENADRINE  MG TABLET: 100 mg = 1 tab(s), Oral, BID,    Home Medications (11) Active  AZELASTINE 0.1% (137 MCG) SPRY 1 spray(s), Both Nostrils, BID  CLONAZEPAM 1 MG TABLET , Oral  [facility] FLUTICASONE PROP 50 MCG , PRN  DIPHENHYDRAMINE 25 MG CAPSULE 25 mg = 1 cap(s), Oral, TID  EPIPEN 2-STEPHANY 0.3 MG AUTO-INJCT   FAMOTIDINE 20 MG TABLET 20 mg = 1 tab(s), PRN, Oral, BID  HYDROCORTISONE LUIS 0.2% CREAM   HYDROXYZINE SOL 25 MG CAP 25 mg = 1 cap(s), Oral, qPM  KlonoPIN 0.5 mg oral tablet 0.5 mg = 1 tab(s), PRN, Oral, BID  ORPHENADRINE  MG TABLET 100 mg = 1 tab(s), Oral, BID  Paxil 40 mg oral tablet 40 mg = 1 tab(s), Oral, Daily  ,    No qualifying data available     Problem list:    All Problems  Penicillin adverse reaction / SNOMED CT 6078753517 / Confirmed  Anxiety / SNOMED CT 76722737 / Confirmed  Brain tumor (benign) / SNOMED CT [id number] / Confirmed  Reflux(  Confirmed  ) / SNOMED CT 052716489 / Confirmed  Insomnia / SNOMED CT 465428540 / Confirmed  Bilateral knee pain / SNOMED CT 61650285 / Confirmed  Well adult exam / SNOMED CT 511521787 / Confirmed  Obesity / SNOMED CT 4744282983 / Probable  Staph skin infection / SNOMED CT [id number] / Confirmed,    Active Problems (9)  Anxiety   Bilateral knee pain   Brain tumor (benign)   Insomnia   Obesity   Penicillin adverse reaction   Reflux(  Confirmed  )   Staph skin infection   Well adult exam         Histories   Past Medical History:    Active  Anxiety (82252422)   Family History:    Heart attack  Father ()  Hypercholesterolemia  Mother  Hypertension.  Mother  Father ()     Procedure history:    Hysterectomy  (782342819).  Deviated septum. ([id number]).  Breast augmentation (6414124180).  Tubal ligation (360822206).  cervical Disc Fusion.   Social History        Social & Psychosocial Habits    Alcohol  07/06/2012 Risk Assessment: Denies Alcohol Use    02/03/2016  Use: Never    Employment/School  02/15/2017  Status: Employed    Exercise  03/29/2017  Self assessment: Good condition    Home/Environment  02/15/2017  Lives with: Children, Spouse    Living situation: Home/Independent    Alcohol abuse in household: No    Substance abuse in household: No    Smoker in household: Yes    Injuries/Abuse/Neglect in household: No    Feels unsafe at home: No    Safe place to go: Yes    Agency(s)/Others notified: No    Family/Friends available to help: Yes    Concern for family members at home: No    Major illness in household: Yes    Financial concerns: No    Concerns over TV/Computer/Game use: No    Other risks in environment: Pets/Animal exposure    Nutrition/Health  03/29/2017  Type of diet: Regular    Other    Comment: denies any recent falls - 02/15/2017 09:35 - Palmira Trevino LPN    Sexual    Comment: confidential - 02/15/2017 09:34 - Palmira Trevino LPN    Substance Abuse  02/03/2016  Use: Never    Tobacco  02/03/2016 Risk Assessment: Medium Risk    02/03/2016  Use: Former smoker    Type: Cigarettes    Started at age: 15.0 Years  .        Physical Examination   Measurements from flowsheet : Measurements   9/6/2017 8:25 CDT        Weight Dosing             89.2 kg                             Weight Measured           89.2 kg                             Weight Measured and Calculated in Lbs     196.65 lb                             Height/Length Dosing      170.18 cm                             Height/Length Measured    170.18 cm                             BSA Measured              2.05 m2                             Body Mass Index Measured  30.8 kg/m2     General:  Alert and oriented.    HENT:   Normocephalic.    Neck:  Supple.    Integumentary:  Warm, Dry, Pink.    Neurologic:  No focal deficits.    Cognition and Speech:  Speech clear and coherent.    Psychiatric:  Appropriate mood & affect.    Bilateral knee exam:  Good ROM with minimal pain/crepitis to anterior knee associated with movement.  TTP with manipulation of patella.  NV intact.  No effusion noted.  +2 pedal pulse.  Extension = 0.  Flexion = 135.  Mild quad deconditioning noted.  Lachman (-).  Anterior/posterior drawer (-).  Varus/valgus stress (-).  McMurrays (-).  Grind test (+).         Health Maintenance      Health Maintenance     Pending (in the next year)     There are no current recommendations pending        Due In Future            Influenza Vaccine not due until  10/02/17  and every 1  year(s)           HIV Screening not due until  02/15/18  and every 1  year(s)           Alcohol Misuse Screening not due until  03/29/18  and every 1  year(s)           Cervical Cancer Screening not due until  05/07/18  and every 5  year(s)           Depression Screening not due until  08/18/18  and every 1  year(s)           Blood Pressure Screening not due until  08/31/18  and every 1  year(s)     Satisfied (in the past 1 year)        Satisfied            Alcohol Misuse Screening on  03/29/17.  Satisfied by Shaista Juan NP           Blood Pressure Screening on  08/31/17.  Satisfied by Lashay Rosas MA           Body Mass Index Check on  09/06/17.  Satisfied by Jelena Campbell LPN           Depression Screening on  08/18/17.  Satisfied by Palmira Trevino LPN           HIV Screening on  02/15/17.  Satisfied by Nelson Reyes Jr.           Influenza Vaccine on  03/29/17.  Satisfied by Shaista Juan NP  Reason: Expectation Satisfied Elsewhere           Obesity Screening on  09/06/17.  Satisfied by Jelena Campbell LPN           Tetanus Vaccine on  08/18/17.  Satisfied by Palmira Trevino LPN        Refused            Tetanus  Vaccine on  03/29/17.  Recorded for Drake ARRIAGA, Shaista Garza  Reason: Patient Refuses          Review / Management   Results review:     No qualifying data available.      Bilateral knee x-rays (9/6/17):    Impression:  No acute abnormalities noted.      Impression and Plan   Diagnosis     Bilateral anterior knee pain (WWB57-GT M25.561).     Obesity (YSZ69-ZR E66.9).       Plan:  1.  Soft neoprene knee sleeves given to use for support/symptom relief.  2.  PT referral for ROM/strengthening to bilateral LE.  3.  OTC medication as needed for pain relief.  4.  Ice compresses and activity modifications as needed for symptom relief.  5.  Diet modifications and low impact exercises to help with weight loss.  6.  RTC PRN.      Patient Instructions:  Knee Pain, Easy-to-Read, Obesity, Easy-to-Read.

## 2022-05-17 ENCOUNTER — TELEPHONE (OUTPATIENT)
Dept: RHEUMATOLOGY | Facility: CLINIC | Age: 37
End: 2022-05-17
Payer: MEDICAID

## 2022-05-30 RX ORDER — PREGABALIN 25 MG/1
75 CAPSULE ORAL 3 TIMES DAILY
Qty: 90 CAPSULE | Refills: 5 | Status: SHIPPED | OUTPATIENT
Start: 2022-05-30 | End: 2022-07-15 | Stop reason: SDUPTHER

## 2022-06-27 ENCOUNTER — PATIENT MESSAGE (OUTPATIENT)
Dept: RHEUMATOLOGY | Facility: CLINIC | Age: 37
End: 2022-06-27
Payer: MEDICAID

## 2022-06-29 ENCOUNTER — PATIENT MESSAGE (OUTPATIENT)
Dept: RHEUMATOLOGY | Facility: CLINIC | Age: 37
End: 2022-06-29
Payer: MEDICAID

## 2022-06-29 RX ORDER — METHYLPREDNISOLONE 4 MG/1
TABLET ORAL
Qty: 21 EACH | Refills: 0 | Status: SHIPPED | OUTPATIENT
Start: 2022-06-29 | End: 2022-07-15 | Stop reason: ALTCHOICE

## 2022-07-15 ENCOUNTER — OFFICE VISIT (OUTPATIENT)
Dept: RHEUMATOLOGY | Facility: CLINIC | Age: 37
End: 2022-07-15
Payer: MEDICAID

## 2022-07-15 VITALS
OXYGEN SATURATION: 99 % | BODY MASS INDEX: 31.21 KG/M2 | HEIGHT: 66 IN | HEART RATE: 90 BPM | WEIGHT: 194.19 LBS | SYSTOLIC BLOOD PRESSURE: 132 MMHG | TEMPERATURE: 98 F | RESPIRATION RATE: 18 BRPM | DIASTOLIC BLOOD PRESSURE: 86 MMHG

## 2022-07-15 DIAGNOSIS — M79.7 FIBROMYALGIA: ICD-10-CM

## 2022-07-15 DIAGNOSIS — M51.37 DDD (DEGENERATIVE DISC DISEASE), LUMBOSACRAL: ICD-10-CM

## 2022-07-15 DIAGNOSIS — M35.9 UNDIFFERENTIATED CONNECTIVE TISSUE DISEASE: ICD-10-CM

## 2022-07-15 DIAGNOSIS — K21.9 GASTROESOPHAGEAL REFLUX DISEASE, UNSPECIFIED WHETHER ESOPHAGITIS PRESENT: ICD-10-CM

## 2022-07-15 DIAGNOSIS — M32.9 SYSTEMIC LUPUS ERYTHEMATOSUS, UNSPECIFIED SLE TYPE, UNSPECIFIED ORGAN INVOLVEMENT STATUS: Primary | ICD-10-CM

## 2022-07-15 DIAGNOSIS — F51.01 PRIMARY INSOMNIA: ICD-10-CM

## 2022-07-15 PROBLEM — L93.0 DISCOID LUPUS ERYTHEMATOSUS: Status: ACTIVE | Noted: 2022-07-15

## 2022-07-15 PROCEDURE — 99999 PR PBB SHADOW E&M-EST. PATIENT-LVL IV: ICD-10-PCS | Mod: PBBFAC,,, | Performed by: INTERNAL MEDICINE

## 2022-07-15 PROCEDURE — 1159F MED LIST DOCD IN RCRD: CPT | Mod: CPTII,,, | Performed by: INTERNAL MEDICINE

## 2022-07-15 PROCEDURE — 1160F RVW MEDS BY RX/DR IN RCRD: CPT | Mod: CPTII,,, | Performed by: INTERNAL MEDICINE

## 2022-07-15 PROCEDURE — 99214 OFFICE O/P EST MOD 30 MIN: CPT | Mod: S$PBB,,, | Performed by: INTERNAL MEDICINE

## 2022-07-15 PROCEDURE — 3008F PR BODY MASS INDEX (BMI) DOCUMENTED: ICD-10-PCS | Mod: CPTII,,, | Performed by: INTERNAL MEDICINE

## 2022-07-15 PROCEDURE — 99999 PR PBB SHADOW E&M-EST. PATIENT-LVL IV: CPT | Mod: PBBFAC,,, | Performed by: INTERNAL MEDICINE

## 2022-07-15 PROCEDURE — 3075F PR MOST RECENT SYSTOLIC BLOOD PRESS GE 130-139MM HG: ICD-10-PCS | Mod: CPTII,,, | Performed by: INTERNAL MEDICINE

## 2022-07-15 PROCEDURE — 99214 OFFICE O/P EST MOD 30 MIN: CPT | Mod: PBBFAC | Performed by: INTERNAL MEDICINE

## 2022-07-15 PROCEDURE — 3079F PR MOST RECENT DIASTOLIC BLOOD PRESSURE 80-89 MM HG: ICD-10-PCS | Mod: CPTII,,, | Performed by: INTERNAL MEDICINE

## 2022-07-15 PROCEDURE — 1160F PR REVIEW ALL MEDS BY PRESCRIBER/CLIN PHARMACIST DOCUMENTED: ICD-10-PCS | Mod: CPTII,,, | Performed by: INTERNAL MEDICINE

## 2022-07-15 PROCEDURE — 99214 PR OFFICE/OUTPT VISIT, EST, LEVL IV, 30-39 MIN: ICD-10-PCS | Mod: S$PBB,,, | Performed by: INTERNAL MEDICINE

## 2022-07-15 PROCEDURE — 3075F SYST BP GE 130 - 139MM HG: CPT | Mod: CPTII,,, | Performed by: INTERNAL MEDICINE

## 2022-07-15 PROCEDURE — 3008F BODY MASS INDEX DOCD: CPT | Mod: CPTII,,, | Performed by: INTERNAL MEDICINE

## 2022-07-15 PROCEDURE — 3079F DIAST BP 80-89 MM HG: CPT | Mod: CPTII,,, | Performed by: INTERNAL MEDICINE

## 2022-07-15 PROCEDURE — 1159F PR MEDICATION LIST DOCUMENTED IN MEDICAL RECORD: ICD-10-PCS | Mod: CPTII,,, | Performed by: INTERNAL MEDICINE

## 2022-07-15 RX ORDER — TIZANIDINE 2 MG/1
2 TABLET ORAL NIGHTLY
Qty: 30 TABLET | Refills: 5 | Status: SHIPPED | OUTPATIENT
Start: 2022-07-15 | End: 2022-11-18 | Stop reason: SDUPTHER

## 2022-07-15 RX ORDER — HYDROXYCHLOROQUINE SULFATE 200 MG/1
200 TABLET, FILM COATED ORAL 2 TIMES DAILY
Qty: 60 TABLET | Refills: 5 | Status: SHIPPED | OUTPATIENT
Start: 2022-07-15 | End: 2022-11-18 | Stop reason: SDUPTHER

## 2022-07-15 RX ORDER — HYDROXYZINE HYDROCHLORIDE 25 MG/1
25 TABLET, FILM COATED ORAL DAILY
COMMUNITY
Start: 2022-06-08 | End: 2023-01-05

## 2022-07-15 RX ORDER — BUPROPION HYDROCHLORIDE 300 MG/1
300 TABLET ORAL EVERY MORNING
Status: ON HOLD | COMMUNITY
Start: 2022-06-06 | End: 2023-01-08 | Stop reason: HOSPADM

## 2022-07-15 RX ORDER — ESCITALOPRAM OXALATE 20 MG/1
20 TABLET ORAL DAILY
COMMUNITY
Start: 2022-06-06 | End: 2022-11-18 | Stop reason: SDUPTHER

## 2022-07-15 RX ORDER — OMEPRAZOLE 40 MG/1
40 CAPSULE, DELAYED RELEASE ORAL EVERY MORNING
Qty: 30 CAPSULE | Refills: 5 | Status: SHIPPED | OUTPATIENT
Start: 2022-07-15 | End: 2022-11-18 | Stop reason: SDUPTHER

## 2022-07-15 RX ORDER — PREDNISONE 5 MG/1
5 TABLET ORAL DAILY
Qty: 30 TABLET | Refills: 5 | Status: SHIPPED | OUTPATIENT
Start: 2022-07-15 | End: 2022-11-18 | Stop reason: SDUPTHER

## 2022-07-15 RX ORDER — TRAMADOL HYDROCHLORIDE 50 MG/1
50 TABLET ORAL 3 TIMES DAILY PRN
Qty: 90 TABLET | Refills: 5 | Status: SHIPPED | OUTPATIENT
Start: 2022-07-15 | End: 2022-07-19

## 2022-07-15 RX ORDER — MYCOPHENOLATE MOFETIL 500 MG/1
1000 TABLET ORAL
Qty: 60 TABLET | Refills: 11 | Status: SHIPPED | OUTPATIENT
Start: 2022-07-15 | End: 2022-11-18 | Stop reason: SDUPTHER

## 2022-07-15 RX ORDER — HYDROCODONE BITARTRATE AND ACETAMINOPHEN 5; 325 MG/1; MG/1
1 TABLET ORAL EVERY 4 HOURS PRN
COMMUNITY
Start: 2022-06-28 | End: 2022-07-15

## 2022-07-15 RX ORDER — TIZANIDINE 2 MG/1
1 TABLET ORAL DAILY
COMMUNITY
Start: 2022-04-15 | End: 2022-07-15 | Stop reason: SDUPTHER

## 2022-07-15 RX ORDER — DEXTROAMPHETAMINE SACCHARATE, AMPHETAMINE ASPARTATE, DEXTROAMPHETAMINE SULFATE AND AMPHETAMINE SULFATE 7.5; 7.5; 7.5; 7.5 MG/1; MG/1; MG/1; MG/1
1 TABLET ORAL 2 TIMES DAILY
COMMUNITY
Start: 2022-06-15 | End: 2023-09-05

## 2022-07-15 RX ORDER — PREGABALIN 25 MG/1
25 CAPSULE ORAL
Qty: 30 CAPSULE | Refills: 5 | Status: SHIPPED | OUTPATIENT
Start: 2022-07-15 | End: 2022-07-19 | Stop reason: SDUPTHER

## 2022-07-15 RX ORDER — TRAZODONE HYDROCHLORIDE 100 MG/1
100 TABLET ORAL NIGHTLY
COMMUNITY
Start: 2022-06-06 | End: 2022-11-18

## 2022-07-15 NOTE — PROGRESS NOTES
"Subjective:       Patient ID: Mary Fernandez is a 37 y.o. female.    Chief Complaint: 3 month follow up (^ body pain & hair loss)    The patient is complaining of joint pain involving the MCP PIP wrist elbow shoulders hips knees and ankles bilaterally.  The pain is 8/10 in intensity dull in quality and continuous.  That is associated with a morning stiffness lasting for more than 60 minutes.  Is also having difficulty maintaining a good night of sleep.  This has been associated with myalgias.  Muscle aches are 9/10 in intensity dull in quality and continuous.  They are associated with fatigue.  MALAR RASH    Review of Systems   Constitutional: Negative for appetite change, chills and fever.   HENT: Negative for congestion, ear pain, mouth sores, nosebleeds and trouble swallowing.    Eyes: Negative for photophobia and discharge.   Respiratory: Negative for chest tightness and shortness of breath.    Cardiovascular: Negative for chest pain.   Gastrointestinal: Negative for abdominal pain and vomiting.   Endocrine: Negative.    Genitourinary: Negative for hematuria.   Musculoskeletal:        As per HPI   Skin: Positive for rash.        MALAR RASH   Neurological: Negative for weakness.         Objective:   /86 (BP Location: Left arm, Patient Position: Sitting, BP Method: Medium (Automatic))   Pulse 90   Temp 98.1 °F (36.7 °C)   Resp 18   Ht 5' 6" (1.676 m)   Wt 88.1 kg (194 lb 3.2 oz)   SpO2 99%   BMI 31.34 kg/m²      Physical Exam   Musculoskeletal:      Right shoulder: Tenderness present.      Left shoulder: Tenderness present.      Right elbow: Tenderness present.      Left elbow: Tenderness present.      Right wrist: Tenderness present.      Left wrist: Tenderness present.      Right hip: Tenderness present.      Left hip: Tenderness present.      Right knee: Tenderness present.      Left knee: Tenderness present.      Right ankle: Tenderness present.      Left ankle: Tenderness present.   Skin:   MALAR " RASH       Right Side Rheumatological Exam     The patient is tender to palpation of the shoulder, elbow, wrist, knee, 1st PIP, 1st MCP, 2nd PIP, 2nd MCP, 3rd PIP, 3rd MCP, 4th PIP, 4th MCP, 5th PIP, hip, ankle, 1st MTP, 2nd MTP, 3rd MTP, 4th MTP, 5th MTP, 1st toe IP, 2nd toe IP, 3rd toe IP, 4th toe IP and 5th toe IP    Left Side Rheumatological Exam     The patient is tender to palpation of the shoulder, elbow, wrist, knee, 1st PIP, 1st MCP, 2nd PIP, 2nd MCP, 3rd PIP, 3rd MCP, 4th PIP, 4th MCP, 5th PIP, 5th MCP, hip, ankle, 1st MTP, 2nd MTP, 3rd MTP, 4th MTP, 5th MTP, 1st toe IP, 2nd toe IP, 3rd toe IP, 4th toe IP and 5th toe IP.         Completed Fibromyalgia exam 18/18 tender points.  No data to display     Assessment:       1. Systemic lupus erythematosus, unspecified SLE type, unspecified organ involvement status    2. DDD (degenerative disc disease), lumbosacral    3. Gastroesophageal reflux disease, unspecified whether esophagitis present    4. Fibromyalgia    5. Primary insomnia    6. Undifferentiated connective tissue disease            Plan:       Problem List Items Addressed This Visit        Neuro    DDD (degenerative disc disease), lumbosacral    Relevant Medications    hydrOXYchloroQUINE (PLAQUENIL) 200 mg tablet    omeprazole (PRILOSEC) 40 MG capsule    pregabalin (LYRICA) 25 MG capsule    tiZANidine (ZANAFLEX) 2 MG tablet    mycophenolate (CELLCEPT) 500 mg Tab    predniSONE (DELTASONE) 5 MG tablet    traMADoL (ULTRAM) 50 mg tablet       Immunology/Multi System    Systemic lupus erythematosus - Primary    Relevant Medications    hydrOXYchloroQUINE (PLAQUENIL) 200 mg tablet    omeprazole (PRILOSEC) 40 MG capsule    pregabalin (LYRICA) 25 MG capsule    tiZANidine (ZANAFLEX) 2 MG tablet    mycophenolate (CELLCEPT) 500 mg Tab    predniSONE (DELTASONE) 5 MG tablet    traMADoL (ULTRAM) 50 mg tablet       GI    Gastroesophageal reflux disease    Relevant Medications    hydrOXYchloroQUINE (PLAQUENIL) 200 mg  tablet    omeprazole (PRILOSEC) 40 MG capsule    pregabalin (LYRICA) 25 MG capsule    tiZANidine (ZANAFLEX) 2 MG tablet    mycophenolate (CELLCEPT) 500 mg Tab    predniSONE (DELTASONE) 5 MG tablet    traMADoL (ULTRAM) 50 mg tablet       Orthopedic    Fibromyalgia    Relevant Medications    hydrOXYchloroQUINE (PLAQUENIL) 200 mg tablet    omeprazole (PRILOSEC) 40 MG capsule    pregabalin (LYRICA) 25 MG capsule    tiZANidine (ZANAFLEX) 2 MG tablet    mycophenolate (CELLCEPT) 500 mg Tab    predniSONE (DELTASONE) 5 MG tablet    traMADoL (ULTRAM) 50 mg tablet       Other    Insomnia    Relevant Medications    hydrOXYchloroQUINE (PLAQUENIL) 200 mg tablet    omeprazole (PRILOSEC) 40 MG capsule    pregabalin (LYRICA) 25 MG capsule    tiZANidine (ZANAFLEX) 2 MG tablet    mycophenolate (CELLCEPT) 500 mg Tab    predniSONE (DELTASONE) 5 MG tablet    traMADoL (ULTRAM) 50 mg tablet      Other Visit Diagnoses     Undifferentiated connective tissue disease        Relevant Medications    hydrOXYchloroQUINE (PLAQUENIL) 200 mg tablet    omeprazole (PRILOSEC) 40 MG capsule    pregabalin (LYRICA) 25 MG capsule    tiZANidine (ZANAFLEX) 2 MG tablet    mycophenolate (CELLCEPT) 500 mg Tab    predniSONE (DELTASONE) 5 MG tablet    traMADoL (ULTRAM) 50 mg tablet

## 2022-07-18 ENCOUNTER — TELEPHONE (OUTPATIENT)
Dept: RHEUMATOLOGY | Facility: CLINIC | Age: 37
End: 2022-07-18
Payer: MEDICAID

## 2022-07-18 DIAGNOSIS — M79.7 FIBROMYALGIA: ICD-10-CM

## 2022-07-18 DIAGNOSIS — M32.9 SYSTEMIC LUPUS ERYTHEMATOSUS, UNSPECIFIED SLE TYPE, UNSPECIFIED ORGAN INVOLVEMENT STATUS: ICD-10-CM

## 2022-07-18 DIAGNOSIS — F51.01 PRIMARY INSOMNIA: ICD-10-CM

## 2022-07-18 DIAGNOSIS — M35.9 UNDIFFERENTIATED CONNECTIVE TISSUE DISEASE: ICD-10-CM

## 2022-07-18 DIAGNOSIS — K21.9 GASTROESOPHAGEAL REFLUX DISEASE, UNSPECIFIED WHETHER ESOPHAGITIS PRESENT: ICD-10-CM

## 2022-07-18 DIAGNOSIS — M51.37 DDD (DEGENERATIVE DISC DISEASE), LUMBOSACRAL: ICD-10-CM

## 2022-07-18 NOTE — TELEPHONE ENCOUNTER
Patient called today stating that the Tramadol 50 mg is not helping.  She is c/o of it making her chest hurt and she thinks it is a side affect from being on Adderall, from what she read about it. Her Pharmacy is [facility] in [place].  Please advise.

## 2022-07-19 ENCOUNTER — TELEPHONE (OUTPATIENT)
Dept: RHEUMATOLOGY | Facility: CLINIC | Age: 37
End: 2022-07-19
Payer: MEDICAID

## 2022-07-19 RX ORDER — PREGABALIN 25 MG/1
25 CAPSULE ORAL 3 TIMES DAILY
Qty: 30 CAPSULE | Refills: 5 | Status: SHIPPED | OUTPATIENT
Start: 2022-07-19 | End: 2022-11-18 | Stop reason: SDUPTHER

## 2022-08-03 ENCOUNTER — PATIENT MESSAGE (OUTPATIENT)
Dept: RHEUMATOLOGY | Facility: CLINIC | Age: 37
End: 2022-08-03
Payer: MEDICAID

## 2022-08-05 RX ORDER — HYDROCODONE BITARTRATE AND ACETAMINOPHEN 10; 325 MG/1; MG/1
1 TABLET ORAL 2 TIMES DAILY PRN
Qty: 60 TABLET | Refills: 0 | Status: SHIPPED | OUTPATIENT
Start: 2022-08-05 | End: 2022-09-12 | Stop reason: SDUPTHER

## 2022-09-12 RX ORDER — HYDROCODONE BITARTRATE AND ACETAMINOPHEN 10; 325 MG/1; MG/1
1 TABLET ORAL 2 TIMES DAILY PRN
Qty: 60 TABLET | Refills: 0 | Status: SHIPPED | OUTPATIENT
Start: 2022-09-12 | End: 2022-10-17 | Stop reason: SDUPTHER

## 2022-09-13 ENCOUNTER — HOSPITAL ENCOUNTER (EMERGENCY)
Facility: HOSPITAL | Age: 37
Discharge: HOME | End: 2022-09-13
Attending: EMERGENCY MEDICINE
Payer: MEDICAID

## 2022-09-13 VITALS
WEIGHT: 183 LBS | RESPIRATION RATE: 17 BRPM | HEART RATE: 87 BPM | BODY MASS INDEX: 29.54 KG/M2 | OXYGEN SATURATION: 99 % | DIASTOLIC BLOOD PRESSURE: 72 MMHG | SYSTOLIC BLOOD PRESSURE: 117 MMHG | TEMPERATURE: 98 F

## 2022-09-13 DIAGNOSIS — R07.9 CHEST PAIN: ICD-10-CM

## 2022-09-13 LAB
ALBUMIN SERPL-MCNC: 4.1 GM/DL (ref 3.5–5)
ALBUMIN/GLOB SERPL: 1.3 RATIO (ref 1.1–2)
ALP SERPL-CCNC: 64 UNIT/L (ref 40–150)
ALT SERPL-CCNC: 14 UNIT/L (ref 0–55)
AST SERPL-CCNC: 16 UNIT/L (ref 5–34)
B-HCG SERPL QL: NEGATIVE
BASOPHILS # BLD AUTO: 0.05 X10(3)/MCL (ref 0–0.2)
BASOPHILS NFR BLD AUTO: 0.6 %
BILIRUBIN DIRECT+TOT PNL SERPL-MCNC: 0.4 MG/DL
BNP BLD-MCNC: <10 PG/ML
BUN SERPL-MCNC: 11.7 MG/DL (ref 7–18.7)
CALCIUM SERPL-MCNC: 9.3 MG/DL (ref 8.4–10.2)
CHLORIDE SERPL-SCNC: 104 MMOL/L (ref 98–107)
CO2 SERPL-SCNC: 25 MMOL/L (ref 22–29)
CREAT SERPL-MCNC: 0.71 MG/DL (ref 0.55–1.02)
EOSINOPHIL # BLD AUTO: 0.18 X10(3)/MCL (ref 0–0.9)
EOSINOPHIL NFR BLD AUTO: 2.2 %
ERYTHROCYTE [DISTWIDTH] IN BLOOD BY AUTOMATED COUNT: 11.9 % (ref 11.5–17)
GFR SERPLBLD CREATININE-BSD FMLA CKD-EPI: >60 MLS/MIN/1.73/M2
GLOBULIN SER-MCNC: 3.2 GM/DL (ref 2.4–3.5)
GLUCOSE SERPL-MCNC: 88 MG/DL (ref 74–100)
HCT VFR BLD AUTO: 39.1 % (ref 37–47)
HGB BLD-MCNC: 12.9 GM/DL (ref 12–16)
IMM GRANULOCYTES # BLD AUTO: 0.02 X10(3)/MCL (ref 0–0.04)
IMM GRANULOCYTES NFR BLD AUTO: 0.2 %
LYMPHOCYTES # BLD AUTO: 1.93 X10(3)/MCL (ref 0.6–4.6)
LYMPHOCYTES NFR BLD AUTO: 24.1 %
MCH RBC QN AUTO: 29.9 PG (ref 27–31)
MCHC RBC AUTO-ENTMCNC: 33 MG/DL (ref 33–36)
MCV RBC AUTO: 90.5 FL (ref 80–94)
MONOCYTES # BLD AUTO: 0.65 X10(3)/MCL (ref 0.1–1.3)
MONOCYTES NFR BLD AUTO: 8.1 %
NEUTROPHILS # BLD AUTO: 5.2 X10(3)/MCL (ref 2.1–9.2)
NEUTROPHILS NFR BLD AUTO: 64.8 %
NRBC BLD AUTO-RTO: 0 %
PLATELET # BLD AUTO: 228 X10(3)/MCL (ref 130–400)
PMV BLD AUTO: 11.3 FL (ref 7.4–10.4)
POTASSIUM SERPL-SCNC: 3.7 MMOL/L (ref 3.5–5.1)
PROT SERPL-MCNC: 7.3 GM/DL (ref 6.4–8.3)
RBC # BLD AUTO: 4.32 X10(6)/MCL (ref 4.2–5.4)
SODIUM SERPL-SCNC: 138 MMOL/L (ref 136–145)
TROPONIN I SERPL-MCNC: <0.01 NG/ML (ref 0–0.04)
TROPONIN I SERPL-MCNC: <0.01 NG/ML (ref 0–0.04)
WBC # SPEC AUTO: 8 X10(3)/MCL (ref 4.5–11.5)

## 2022-09-13 PROCEDURE — 84484 ASSAY OF TROPONIN QUANT: CPT | Performed by: NURSE PRACTITIONER

## 2022-09-13 PROCEDURE — 81025 URINE PREGNANCY TEST: CPT | Performed by: NURSE PRACTITIONER

## 2022-09-13 PROCEDURE — 80053 COMPREHEN METABOLIC PANEL: CPT | Performed by: NURSE PRACTITIONER

## 2022-09-13 PROCEDURE — 85025 COMPLETE CBC W/AUTO DIFF WBC: CPT | Performed by: NURSE PRACTITIONER

## 2022-09-13 PROCEDURE — 99285 EMERGENCY DEPT VISIT HI MDM: CPT | Mod: 25

## 2022-09-13 PROCEDURE — 83880 ASSAY OF NATRIURETIC PEPTIDE: CPT | Performed by: NURSE PRACTITIONER

## 2022-09-13 PROCEDURE — 93005 ELECTROCARDIOGRAM TRACING: CPT

## 2022-09-13 PROCEDURE — 93010 ELECTROCARDIOGRAM REPORT: CPT | Mod: ,,, | Performed by: INTERNAL MEDICINE

## 2022-09-13 PROCEDURE — 36415 COLL VENOUS BLD VENIPUNCTURE: CPT | Performed by: NURSE PRACTITIONER

## 2022-09-13 PROCEDURE — 93010 EKG 12-LEAD: ICD-10-PCS | Mod: ,,, | Performed by: INTERNAL MEDICINE

## 2022-09-13 NOTE — FIRST PROVIDER EVALUATION
"Medical screening examination initiated.  I have conducted a focused provider triage encounter, findings are as follows:    Brief history of present illness:  Patient states weakness and feeling like she was going to "pass out" PTA.     There were no vitals filed for this visit.    Pertinent physical exam:  Awake, alert, ambulatory    Brief workup plan:  labs    Preliminary workup initiated; this workup will be continued and followed by the physician or advanced practice provider that is assigned to the patient when roomed.  "

## 2022-09-14 ENCOUNTER — PATIENT MESSAGE (OUTPATIENT)
Dept: RHEUMATOLOGY | Facility: CLINIC | Age: 37
End: 2022-09-14
Payer: MEDICAID

## 2022-09-14 NOTE — ED NOTES
Pt presents to ed with complaints of weakness, chest tightness, sob starting today. Pt states she has hx of anxiety. Pt in NAD, AAOx4. ED provider at bedside. Vitals obtained. Visitor at bedside. Pt with no complaints at this time. Respirations equal and unlabored

## 2022-09-14 NOTE — ED PROVIDER NOTES
Encounter Date: 9/13/2022       History     Chief Complaint   Patient presents with    Anxiety    Weakness    Chest Pain     Pt presents c/o CP, weakness, and anxiety.  Onset today.  Dr mcbride/ unknown dx.      Patient is a 37-year-old female  that presents with chest pain that has been present prior to arrival. Associated symptoms anxious. Surrounding information is patient was at the [facility] and began to have chest pain. Exacerbated by nothing. Relieved by nothing. Patient treatment prior to arrival none. Risk factors include none. Other history pertaining to this complaint of nothing.       The history is provided by the patient. No  was used.   Review of patient's allergies indicates:   Allergen Reactions    Ibuprofen Anaphylaxis    Metoclopramide Anaphylaxis and Other (See Comments)     Other reaction(s): Paralysis of vertical movement      Carisoprodol Other (See Comments)    Gabapentin Other (See Comments)     Other reaction(s): C/O - a headache      Clindamycin Rash    Cyclobenzaprine Palpitations     Other reaction(s): SOB and increased heart rate      Morphine Itching    Penicillin v potassium Rash     Other reaction(s): Rash      Rizatriptan Nausea And Vomiting     Past Medical History:   Diagnosis Date    ADHD (attention deficit hyperactivity disorder)     Allergy     Anxiety     Atopic dermatitis 2018    Brain tumor 2008    Endometriosis     GERD (gastroesophageal reflux disease)     IC (interstitial cystitis)     Obsessive-compulsive disorder     Seizures      Past Surgical History:   Procedure Laterality Date    AUGMENTATION OF BREAST      CERVICAL FUSION      HYSTERECTOMY      LAPAROSCOPIC DRAINAGE OF CYST OF OVARY      NASAL SEPTOPLASTY  2007     Family History   Problem Relation Age of Onset    Drug abuse Mother     Hypertension Mother     Heart disease Father     Hypertension Father     Alcohol abuse Father     Early death Father      Social History     Tobacco Use    Smoking status:  Former    Smokeless tobacco: Never   Substance Use Topics    Alcohol use: Yes    Drug use: Never     Review of Systems   Constitutional:  Negative for fever.   Respiratory:  Negative for cough and shortness of breath.    Cardiovascular:  Positive for chest pain.   Gastrointestinal:  Negative for abdominal pain.   Genitourinary:  Negative for difficulty urinating and dysuria.   Musculoskeletal:  Negative for gait problem.   Skin:  Negative for color change.   Neurological:  Negative for dizziness, speech difficulty and headaches.   Psychiatric/Behavioral:  Negative for hallucinations and suicidal ideas. The patient is nervous/anxious.    All other systems reviewed and are negative.    Physical Exam     Initial Vitals [09/13/22 1648]   BP Pulse Resp Temp SpO2   (!) 158/89 (!) 119 18 98.2 °F (36.8 °C) 99 %      MAP       --         Physical Exam    Nursing note and vitals reviewed.  Constitutional: She appears well-developed and well-nourished.   HENT:   Head: Normocephalic.   Eyes: EOM are normal.   Neck:   Normal range of motion.  Cardiovascular:  Normal rate, regular rhythm, normal heart sounds and intact distal pulses.           Pulmonary/Chest: Breath sounds normal. No respiratory distress.   Abdominal: Abdomen is soft. Bowel sounds are normal. There is no abdominal tenderness.   Musculoskeletal:         General: Normal range of motion.      Cervical back: Normal range of motion.     Neurological: She is alert and oriented to person, place, and time. She has normal strength.   Skin: Skin is warm and dry.   Psychiatric: She has a normal mood and affect. Her behavior is normal. Judgment and thought content normal.       ED Course   Procedures  Labs Reviewed   CBC WITH DIFFERENTIAL - Abnormal; Notable for the following components:       Result Value    MPV 11.3 (*)     All other components within normal limits   TROPONIN I - Normal   B-TYPE NATRIURETIC PEPTIDE - Normal   HCG QUALITATIVE URINE - Normal   TROPONIN I -  Normal   COMPREHENSIVE METABOLIC PANEL   CBC W/ AUTO DIFFERENTIAL    Narrative:     The following orders were created for panel order CBC Auto Differential.  Procedure                               Abnormality         Status                     ---------                               -----------         ------                     CBC with Differential[[id number]]        Abnormal            Final result                 Please view results for these tests on the individual orders.     EKG Readings: (Independently Interpreted)   Rhythm: Sinus Tachycardia. Heart Rate: 118. Ectopy: No Ectopy. Conduction: Normal. ST Segments: Normal ST Segments. T Waves: Normal. Axis: Normal. Clinical Impression: Normal Sinus Rhythm   ECG Results              EKG 12-lead (Final result)  Result time 09/13/22 18:32:33      Final result by Interface, Lab In [facility] (09/13/22 18:32:33)                   Narrative:    Test Reason : R07.9,    Vent. Rate : 118 BPM     Atrial Rate : 118 BPM     P-R Int : 142 ms          QRS Dur : 088 ms      QT Int : 322 ms       P-R-T Axes : 077 088 032 degrees     QTc Int : 451 ms    Sinus tachycardia  Otherwise normal ECG  No previous ECGs available  Confirmed by Christ Ramos MD ([id number]) on 9/13/2022 6:32:25 PM    Referred By:             Confirmed By:Christ Ramos MD                                  Imaging Results              X-Ray Chest PA And Lateral (Final result)  Result time 09/13/22 17:42:27      Final result by Marcella Damon MD (09/13/22 17:42:27)                   Impression:      No acute cardiopulmonary abnormality.      Electronically signed by: Marcella Damon  Date:    09/13/2022  Time:    17:42               Narrative:    EXAMINATION:  XR CHEST PA AND LATERAL    CLINICAL HISTORY:  chest pain;    TECHNIQUE:  Two views of the chest    COMPARISON:  08/02/2021    FINDINGS:  LINES AND TUBES: None    MEDIASTINUM AND ERI: The cardiac silhouette is normal.    LUNGS: No lobar  consolidation. No edema.    PLEURA:No pleural effusion. No pneumothorax.    BONES: Postop ACDF.                                       Medications - No data to display              ED Course as of 09/13/22 2104   Tue Sep 13, 2022   1940 Patient's heart rate is 95 [CL]   2103 Patient with troponins negative x2.  She is low heart score low ANDI score.  Will have her follow-up with Dr. Bejarano  [CL]      ED Course User Index  [CL] MARIAN Urias                   Clinical Impression:   Final diagnoses:  [R07.9] Chest pain        ED Disposition Condition    Discharge Stable          ED Prescriptions    None       Follow-up Information       Follow up With Specialties Details Why Contact Info    Johnathon eBjarano MD Cardiology Call in 3 days ed follow up [address] [place] [address].  [facility] [address]  [phone]               MARIAN Urias  09/13/22 2104

## 2022-09-16 ENCOUNTER — HISTORICAL (OUTPATIENT)
Dept: ADMINISTRATIVE | Facility: HOSPITAL | Age: 37
End: 2022-09-16
Payer: MEDICAID

## 2022-10-17 RX ORDER — HYDROCODONE BITARTRATE AND ACETAMINOPHEN 10; 325 MG/1; MG/1
1 TABLET ORAL 2 TIMES DAILY PRN
Qty: 60 TABLET | Refills: 0 | Status: SHIPPED | OUTPATIENT
Start: 2022-10-17 | End: 2022-11-14 | Stop reason: SDUPTHER

## 2022-11-15 RX ORDER — HYDROCODONE BITARTRATE AND ACETAMINOPHEN 10; 325 MG/1; MG/1
1 TABLET ORAL 2 TIMES DAILY PRN
Qty: 60 TABLET | Refills: 0 | Status: SHIPPED | OUTPATIENT
Start: 2022-11-15 | End: 2022-12-12 | Stop reason: SDUPTHER

## 2022-11-15 RX ORDER — HYDROCODONE BITARTRATE AND ACETAMINOPHEN 10; 325 MG/1; MG/1
1 TABLET ORAL 2 TIMES DAILY PRN
Qty: 60 TABLET | Refills: 0 | Status: SHIPPED | OUTPATIENT
Start: 2022-11-15 | End: 2022-12-15

## 2022-11-18 DIAGNOSIS — F51.01 PRIMARY INSOMNIA: ICD-10-CM

## 2022-11-18 DIAGNOSIS — M51.37 DDD (DEGENERATIVE DISC DISEASE), LUMBOSACRAL: ICD-10-CM

## 2022-11-18 DIAGNOSIS — M35.9 UNDIFFERENTIATED CONNECTIVE TISSUE DISEASE: ICD-10-CM

## 2022-11-18 DIAGNOSIS — M79.7 FIBROMYALGIA: ICD-10-CM

## 2022-11-18 DIAGNOSIS — M32.9 SYSTEMIC LUPUS ERYTHEMATOSUS, UNSPECIFIED SLE TYPE, UNSPECIFIED ORGAN INVOLVEMENT STATUS: ICD-10-CM

## 2022-11-18 DIAGNOSIS — K21.9 GASTROESOPHAGEAL REFLUX DISEASE, UNSPECIFIED WHETHER ESOPHAGITIS PRESENT: ICD-10-CM

## 2022-11-18 RX ORDER — OMEPRAZOLE 40 MG/1
40 CAPSULE, DELAYED RELEASE ORAL EVERY MORNING
Qty: 30 CAPSULE | Refills: 5 | Status: SHIPPED | OUTPATIENT
Start: 2022-11-18 | End: 2023-01-05 | Stop reason: SDUPTHER

## 2022-11-18 RX ORDER — PREGABALIN 25 MG/1
25 CAPSULE ORAL NIGHTLY
Qty: 30 CAPSULE | Refills: 5 | Status: SHIPPED | OUTPATIENT
Start: 2022-11-18 | End: 2023-01-05 | Stop reason: SDUPTHER

## 2022-11-18 RX ORDER — HYDROXYCHLOROQUINE SULFATE 200 MG/1
200 TABLET, FILM COATED ORAL 2 TIMES DAILY
Qty: 60 TABLET | Refills: 5 | Status: SHIPPED | OUTPATIENT
Start: 2022-11-18 | End: 2023-01-05 | Stop reason: SDUPTHER

## 2022-11-18 RX ORDER — PREDNISONE 5 MG/1
5 TABLET ORAL DAILY
Qty: 30 TABLET | Refills: 5 | Status: SHIPPED | OUTPATIENT
Start: 2022-11-18 | End: 2023-01-05 | Stop reason: SDUPTHER

## 2022-11-18 RX ORDER — CLONAZEPAM 1 MG/1
1 TABLET ORAL 3 TIMES DAILY PRN
Qty: 90 TABLET | Refills: 5 | Status: SHIPPED | OUTPATIENT
Start: 2022-11-18 | End: 2023-08-21 | Stop reason: SDUPTHER

## 2022-11-18 RX ORDER — TIZANIDINE 2 MG/1
2 TABLET ORAL NIGHTLY
Qty: 30 TABLET | Refills: 5 | Status: SHIPPED | OUTPATIENT
Start: 2022-11-18 | End: 2023-01-05 | Stop reason: SDUPTHER

## 2022-11-18 RX ORDER — ESCITALOPRAM OXALATE 20 MG/1
20 TABLET ORAL DAILY
Qty: 30 TABLET | Refills: 5 | Status: SHIPPED | OUTPATIENT
Start: 2022-11-18 | End: 2023-01-05 | Stop reason: SDUPTHER

## 2022-11-18 RX ORDER — MYCOPHENOLATE MOFETIL 500 MG/1
1000 TABLET ORAL 2 TIMES DAILY
Qty: 120 TABLET | Refills: 11 | Status: SHIPPED | OUTPATIENT
Start: 2022-11-18 | End: 2023-01-05 | Stop reason: SDUPTHER

## 2022-12-09 ENCOUNTER — PATIENT MESSAGE (OUTPATIENT)
Dept: RHEUMATOLOGY | Facility: CLINIC | Age: 37
End: 2022-12-09
Payer: MEDICAID

## 2022-12-09 RX ORDER — HYDROCODONE BITARTRATE AND ACETAMINOPHEN 10; 325 MG/1; MG/1
1 TABLET ORAL 2 TIMES DAILY PRN
Qty: 60 TABLET | Refills: 0 | OUTPATIENT
Start: 2022-12-09 | End: 2023-01-08

## 2022-12-12 RX ORDER — HYDROCODONE BITARTRATE AND ACETAMINOPHEN 10; 325 MG/1; MG/1
1 TABLET ORAL 2 TIMES DAILY PRN
Qty: 60 TABLET | Refills: 0 | Status: SHIPPED | OUTPATIENT
Start: 2022-12-12 | End: 2023-01-05 | Stop reason: SDUPTHER

## 2023-01-03 ENCOUNTER — PATIENT MESSAGE (OUTPATIENT)
Dept: RHEUMATOLOGY | Facility: CLINIC | Age: 38
End: 2023-01-03
Payer: MEDICAID

## 2023-01-04 ENCOUNTER — TELEPHONE (OUTPATIENT)
Dept: RHEUMATOLOGY | Facility: CLINIC | Age: 38
End: 2023-01-04
Payer: MEDICAID

## 2023-01-04 NOTE — TELEPHONE ENCOUNTER
Patient has not been seen since July and she canceled her last appointment   From pt: Good morning Dr. Costa! Since starting on the norco my pain has for the most part been controlled much better, but Im still having break through pain as Ive been working 6 days and week with the occasional double just trying to make ends meet. Would it be ok if I took it 3 times a day as needed? And if so could you please send me a new prescription to [facility] so I dont run out?   Thank you Monie

## 2023-01-04 NOTE — TELEPHONE ENCOUNTER
The problem is that she has not been seen in 6 months.  She is also asking to increase the dose of the pain medicines.  Please ask the patient if she is able to come in tomorrow at 8:00 a.m. to be seen so we can document and justify the need for the increased dose of pain medicine otherwise we will not be able to increase the dose nor even continue filling the pain medicines.  If she can not come tomorrow at 8 we will continue helping with the current dose of pain medicines until she can be booked for an earlier appointment  than her March appointment.  I wish she did not cancel last time.  Thank you BPH

## 2023-01-05 ENCOUNTER — OFFICE VISIT (OUTPATIENT)
Dept: RHEUMATOLOGY | Facility: CLINIC | Age: 38
End: 2023-01-05
Payer: MEDICAID

## 2023-01-05 VITALS
RESPIRATION RATE: 18 BRPM | BODY MASS INDEX: 31.34 KG/M2 | HEART RATE: 77 BPM | OXYGEN SATURATION: 99 % | SYSTOLIC BLOOD PRESSURE: 125 MMHG | WEIGHT: 195 LBS | TEMPERATURE: 98 F | DIASTOLIC BLOOD PRESSURE: 84 MMHG | HEIGHT: 66 IN

## 2023-01-05 DIAGNOSIS — M35.9 UNDIFFERENTIATED CONNECTIVE TISSUE DISEASE: ICD-10-CM

## 2023-01-05 DIAGNOSIS — F51.01 PRIMARY INSOMNIA: ICD-10-CM

## 2023-01-05 DIAGNOSIS — D33.2 BENIGN NEOPLASM OF BRAIN, UNSPECIFIED BRAIN REGION: ICD-10-CM

## 2023-01-05 DIAGNOSIS — M79.7 FIBROMYALGIA: ICD-10-CM

## 2023-01-05 DIAGNOSIS — M50.30 DEGENERATIVE CERVICAL DISC: ICD-10-CM

## 2023-01-05 DIAGNOSIS — M32.9 SYSTEMIC LUPUS ERYTHEMATOSUS, UNSPECIFIED SLE TYPE, UNSPECIFIED ORGAN INVOLVEMENT STATUS: Primary | ICD-10-CM

## 2023-01-05 DIAGNOSIS — K21.9 GASTROESOPHAGEAL REFLUX DISEASE, UNSPECIFIED WHETHER ESOPHAGITIS PRESENT: ICD-10-CM

## 2023-01-05 DIAGNOSIS — M51.37 DDD (DEGENERATIVE DISC DISEASE), LUMBOSACRAL: ICD-10-CM

## 2023-01-05 DIAGNOSIS — L93.0 DISCOID LUPUS ERYTHEMATOSUS: ICD-10-CM

## 2023-01-05 PROCEDURE — 3074F PR MOST RECENT SYSTOLIC BLOOD PRESSURE < 130 MM HG: ICD-10-PCS | Mod: CPTII,,, | Performed by: INTERNAL MEDICINE

## 2023-01-05 PROCEDURE — 99999 PR PBB SHADOW E&M-EST. PATIENT-LVL III: CPT | Mod: PBBFAC,,, | Performed by: INTERNAL MEDICINE

## 2023-01-05 PROCEDURE — 99999 PR PBB SHADOW E&M-EST. PATIENT-LVL III: ICD-10-PCS | Mod: PBBFAC,,, | Performed by: INTERNAL MEDICINE

## 2023-01-05 PROCEDURE — 99214 PR OFFICE/OUTPT VISIT, EST, LEVL IV, 30-39 MIN: ICD-10-PCS | Mod: S$PBB,,, | Performed by: INTERNAL MEDICINE

## 2023-01-05 PROCEDURE — 3074F SYST BP LT 130 MM HG: CPT | Mod: CPTII,,, | Performed by: INTERNAL MEDICINE

## 2023-01-05 PROCEDURE — 99214 OFFICE O/P EST MOD 30 MIN: CPT | Mod: S$PBB,,, | Performed by: INTERNAL MEDICINE

## 2023-01-05 PROCEDURE — 1159F MED LIST DOCD IN RCRD: CPT | Mod: CPTII,,, | Performed by: INTERNAL MEDICINE

## 2023-01-05 PROCEDURE — 3008F PR BODY MASS INDEX (BMI) DOCUMENTED: ICD-10-PCS | Mod: CPTII,,, | Performed by: INTERNAL MEDICINE

## 2023-01-05 PROCEDURE — 99213 OFFICE O/P EST LOW 20 MIN: CPT | Mod: PBBFAC | Performed by: INTERNAL MEDICINE

## 2023-01-05 PROCEDURE — 3079F DIAST BP 80-89 MM HG: CPT | Mod: CPTII,,, | Performed by: INTERNAL MEDICINE

## 2023-01-05 PROCEDURE — 3079F PR MOST RECENT DIASTOLIC BLOOD PRESSURE 80-89 MM HG: ICD-10-PCS | Mod: CPTII,,, | Performed by: INTERNAL MEDICINE

## 2023-01-05 PROCEDURE — 3008F BODY MASS INDEX DOCD: CPT | Mod: CPTII,,, | Performed by: INTERNAL MEDICINE

## 2023-01-05 PROCEDURE — 1159F PR MEDICATION LIST DOCUMENTED IN MEDICAL RECORD: ICD-10-PCS | Mod: CPTII,,, | Performed by: INTERNAL MEDICINE

## 2023-01-05 RX ORDER — PREDNISONE 5 MG/1
5 TABLET ORAL DAILY PRN
Qty: 30 TABLET | Refills: 5 | Status: SHIPPED | OUTPATIENT
Start: 2023-01-05 | End: 2023-09-05 | Stop reason: SDUPTHER

## 2023-01-05 RX ORDER — MYCOPHENOLATE MOFETIL 500 MG/1
1000 TABLET ORAL 2 TIMES DAILY
Qty: 120 TABLET | Refills: 11 | Status: SHIPPED | OUTPATIENT
Start: 2023-01-05 | End: 2023-09-05 | Stop reason: SDUPTHER

## 2023-01-05 RX ORDER — OMEPRAZOLE 40 MG/1
40 CAPSULE, DELAYED RELEASE ORAL EVERY MORNING
Qty: 30 CAPSULE | Refills: 5 | Status: SHIPPED | OUTPATIENT
Start: 2023-01-05 | End: 2023-01-13 | Stop reason: ALTCHOICE

## 2023-01-05 RX ORDER — HYDROXYCHLOROQUINE SULFATE 200 MG/1
200 TABLET, FILM COATED ORAL 2 TIMES DAILY
Qty: 60 TABLET | Refills: 5 | Status: SHIPPED | OUTPATIENT
Start: 2023-01-05 | End: 2023-09-05 | Stop reason: SDUPTHER

## 2023-01-05 RX ORDER — TIZANIDINE 2 MG/1
2 TABLET ORAL NIGHTLY
Qty: 30 TABLET | Refills: 5 | Status: SHIPPED | OUTPATIENT
Start: 2023-01-05 | End: 2023-09-05

## 2023-01-05 RX ORDER — LEVOCETIRIZINE DIHYDROCHLORIDE 5 MG/1
5 TABLET, FILM COATED ORAL NIGHTLY
Qty: 30 TABLET | Refills: 11 | Status: SHIPPED | OUTPATIENT
Start: 2023-01-05 | End: 2024-01-05

## 2023-01-05 RX ORDER — ESCITALOPRAM OXALATE 20 MG/1
20 TABLET ORAL DAILY
Qty: 30 TABLET | Refills: 5 | Status: SHIPPED | OUTPATIENT
Start: 2023-01-05

## 2023-01-05 RX ORDER — HYDROCODONE BITARTRATE AND ACETAMINOPHEN 10; 325 MG/1; MG/1
1 TABLET ORAL 3 TIMES DAILY PRN
Qty: 90 TABLET | Refills: 0 | Status: SHIPPED | OUTPATIENT
Start: 2023-01-05 | End: 2023-02-02 | Stop reason: SDUPTHER

## 2023-01-05 RX ORDER — PREGABALIN 25 MG/1
25 CAPSULE ORAL 2 TIMES DAILY
Qty: 60 CAPSULE | Refills: 5 | Status: SHIPPED | OUTPATIENT
Start: 2023-01-05 | End: 2023-09-05

## 2023-01-05 NOTE — PROGRESS NOTES
"Subjective:       Patient ID: Mary Fernandez is a 37 y.o. female.    Chief Complaint: Follow-up (F/u of Lupus.  She is c/o still having pain in her joints.)    The patient is complaining of joint pain involving the MCP PIP wrist elbow shoulders hips knees and ankles bilaterally.  The pain is 7/10 in intensity dull in quality and continuous.  That is associated with a morning stiffness lasting for more than 60 minutes.  Is also having difficulty maintaining a good night of sleep.  This has been associated with myalgias.  Muscle aches are 8/10 in intensity dull in quality and continuous.  They are associated with fatigue.  No fever no chills no others.      Review of Systems   Constitutional:  Negative for appetite change, chills and fever.   HENT:  Negative for congestion, ear pain, mouth sores, nosebleeds and trouble swallowing.    Eyes:  Negative for photophobia and discharge.   Respiratory:  Negative for chest tightness and shortness of breath.    Cardiovascular:  Negative for chest pain.   Gastrointestinal:  Negative for abdominal pain and vomiting.   Endocrine: Negative.    Genitourinary:  Negative for hematuria.   Musculoskeletal:         As per HPI   Skin:  Negative for rash.   Neurological:  Negative for weakness.       Objective:   /84 (BP Location: Left arm, Patient Position: Sitting, BP Method: Medium (Automatic))   Pulse 77   Temp 97.9 °F (36.6 °C) (Oral)   Resp 18   Ht 5' 6" (1.676 m)   Wt 88.5 kg (195 lb)   SpO2 99%   BMI 31.47 kg/m²      Physical Exam   Constitutional: She is oriented to person, place, and time. She appears well-developed and well-nourished. No distress.   HENT:   Head: Normocephalic and atraumatic.   Right Ear: External ear normal.   Left Ear: External ear normal.   Eyes: Pupils are equal, round, and reactive to light.   Cardiovascular: Normal rate, regular rhythm and normal heart sounds.   Pulmonary/Chest: Breath sounds normal.   Abdominal: Soft. There is no abdominal " tenderness.   Musculoskeletal:      Right shoulder: Tenderness present.      Left shoulder: Tenderness present.      Right elbow: Tenderness present.      Left elbow: Tenderness present.      Right wrist: Tenderness present.      Left wrist: Tenderness present.      Cervical back: Neck supple.      Right hip: Tenderness present.      Left hip: Tenderness present.      Right knee: Tenderness present.      Left knee: Tenderness present.      Right ankle: Tenderness present.      Left ankle: Tenderness present.   Lymphadenopathy:     She has no cervical adenopathy.   Neurological: She is alert and oriented to person, place, and time. She displays normal reflexes. No cranial nerve deficit or sensory deficit. She exhibits normal muscle tone. Coordination normal.   Skin: No rash noted. No erythema.   Vitals reviewed.      Right Side Rheumatological Exam     The patient is tender to palpation of the shoulder, elbow, wrist, knee, 1st PIP, 1st MCP, 2nd PIP, 2nd MCP, 3rd PIP, 3rd MCP, 4th PIP, 4th MCP, 5th PIP, hip, ankle, 1st MTP, 2nd MTP, 3rd MTP, 4th MTP, 5th MTP, 1st toe IP, 2nd toe IP, 3rd toe IP, 4th toe IP and 5th toe IP    Left Side Rheumatological Exam     The patient is tender to palpation of the shoulder, elbow, wrist, knee, 1st PIP, 1st MCP, 2nd PIP, 2nd MCP, 3rd PIP, 3rd MCP, 4th PIP, 4th MCP, 5th PIP, 5th MCP, hip, ankle, 1st MTP, 2nd MTP, 3rd MTP, 4th MTP, 5th MTP, 1st toe IP, 2nd toe IP, 3rd toe IP, 4th toe IP and 5th toe IP.       Completed Fibromyalgia exam 18/18 tender points.  No data to display     Assessment:       1. Systemic lupus erythematosus, unspecified SLE type, unspecified organ involvement status    2. Discoid lupus erythematosus    3. DDD (degenerative disc disease), lumbosacral    4. Degenerative cervical disc    5. Benign neoplasm of brain, unspecified brain region    6. Fibromyalgia    7. Primary insomnia    8. Gastroesophageal reflux disease, unspecified whether esophagitis present    9.  Undifferentiated connective tissue disease            Medication List with Changes/Refills   New Medications    LEVOCETIRIZINE (XYZAL) 5 MG TABLET    Take 1 tablet (5 mg total) by mouth every evening.       Start Date: 1/5/2023  End Date: 1/5/2024   Current Medications    ALBUTEROL (PROVENTIL/VENTOLIN HFA) 90 MCG/ACTUATION INHALER    Take 2 puffs by mouth every 6 (six) hours as needed.       Start Date: 4/2/2019  End Date: --    BUPROPION (WELLBUTRIN XL) 300 MG 24 HR TABLET    Take 300 mg by mouth every morning.       Start Date: 6/6/2022  End Date: --    CLONAZEPAM (KLONOPIN) 1 MG TABLET    Take 1 tablet (1 mg total) by mouth 3 (three) times daily as needed for Anxiety.       Start Date: 11/18/2022End Date: --    DEXTROAMPHETAMINE-AMPHETAMINE 30 MG TAB    Take 1 tablet by mouth 2 (two) times daily.       Start Date: 6/15/2022 End Date: --    DUPILUMAB (DUPIXENT) 300 MG/2 ML SYRG    Inject 300 mg into the skin. Every other week       Start Date: 2/18/2019 End Date: --    FLUTICASONE PROPIONATE (FLONASE) 50 MCG/ACTUATION NASAL SPRAY    1 spray by Each Nare route once daily.       Start Date: --        End Date: --    ONDANSETRON (ZOFRAN) 8 MG TABLET    TK 1 T PO TID PRN       Start Date: 3/23/2020 End Date: --   Changed and/or Refilled Medications    Modified Medication Previous Medication    ESCITALOPRAM OXALATE (LEXAPRO) 20 MG TABLET EScitalopram oxalate (LEXAPRO) 20 MG tablet       Take 1 tablet (20 mg total) by mouth once daily.    Take 1 tablet (20 mg total) by mouth once daily.       Start Date: 1/5/2023  End Date: --    Start Date: 11/18/2022End Date: 1/5/2023    HYDROCODONE-ACETAMINOPHEN (NORCO)  MG PER TABLET HYDROcodone-acetaminophen (NORCO)  mg per tablet       Take 1 tablet by mouth 3 (three) times daily as needed for Pain.    Take 1 tablet by mouth 2 (two) times daily as needed for Pain.       Start Date: 1/5/2023  End Date: 2/4/2023    Start Date: 12/12/2022End Date: 1/5/2023     HYDROXYCHLOROQUINE (PLAQUENIL) 200 MG TABLET hydrOXYchloroQUINE (PLAQUENIL) 200 mg tablet       Take 1 tablet (200 mg total) by mouth 2 (two) times daily.    Take 1 tablet (200 mg total) by mouth 2 (two) times daily.       Start Date: 1/5/2023  End Date: --    Start Date: 11/18/2022End Date: 1/5/2023    MYCOPHENOLATE (CELLCEPT) 500 MG TAB mycophenolate (CELLCEPT) 500 mg Tab       Take 2 tablets (1,000 mg total) by mouth 2 (two) times daily.    Take 2 tablets (1,000 mg total) by mouth 2 (two) times daily.       Start Date: 1/5/2023  End Date: 1/5/2024    Start Date: 11/18/2022End Date: 1/5/2023    OMEPRAZOLE (PRILOSEC) 40 MG CAPSULE omeprazole (PRILOSEC) 40 MG capsule       Take 1 capsule (40 mg total) by mouth every morning.    Take 1 capsule (40 mg total) by mouth every morning.       Start Date: 1/5/2023  End Date: 1/5/2024    Start Date: 11/18/2022End Date: 1/5/2023    PREDNISONE (DELTASONE) 5 MG TABLET predniSONE (DELTASONE) 5 MG tablet       Take 1 tablet (5 mg total) by mouth daily as needed (flare ups). AFTER BREAKFAST    Take 1 tablet (5 mg total) by mouth once daily. AFTER BREAKFAST       Start Date: 1/5/2023  End Date: --    Start Date: 11/18/2022End Date: 1/5/2023    PREGABALIN (LYRICA) 25 MG CAPSULE pregabalin (LYRICA) 25 MG capsule       Take 1 capsule (25 mg total) by mouth 2 (two) times daily.    Take 1 capsule (25 mg total) by mouth every evening.       Start Date: 1/5/2023  End Date: 7/6/2023    Start Date: 11/18/2022End Date: 1/5/2023    TIZANIDINE (ZANAFLEX) 2 MG TABLET tiZANidine (ZANAFLEX) 2 MG tablet       Take 1 tablet (2 mg total) by mouth nightly.    Take 1 tablet (2 mg total) by mouth nightly.       Start Date: 1/5/2023  End Date: --    Start Date: 11/18/2022End Date: 1/5/2023   Discontinued Medications    HYDROXYZINE HCL (ATARAX) 25 MG TABLET    Take 25 mg by mouth once daily. PRN       Start Date: 6/8/2022  End Date: 1/5/2023    LEVOCETIRIZINE (XYZAL) 5 MG TABLET    Take 5 mg by mouth  once daily.       Start Date: --        End Date: 1/5/2023         Plan:         Problem List Items Addressed This Visit          Neuro    DDD (degenerative disc disease), lumbosacral    Relevant Medications    EScitalopram oxalate (LEXAPRO) 20 MG tablet    HYDROcodone-acetaminophen (NORCO)  mg per tablet    hydrOXYchloroQUINE (PLAQUENIL) 200 mg tablet    levocetirizine (XYZAL) 5 MG tablet    mycophenolate (CELLCEPT) 500 mg Tab    omeprazole (PRILOSEC) 40 MG capsule    predniSONE (DELTASONE) 5 MG tablet    pregabalin (LYRICA) 25 MG capsule    tiZANidine (ZANAFLEX) 2 MG tablet    Other Relevant Orders    DSDNA    C4 Complement    C3 Complement    Urinalysis    Protein/Creatinine Ratio, Urine    Quantiferon Gold TB    CBC Auto Differential    Comprehensive Metabolic Panel    Vitamin D    Antinuclear Ab, HEp-2 Substrate    Hepatitis B Surface Antigen    Hepatitis C Antibody    TSH    T4, Free    Degenerative cervical disc    Relevant Medications    EScitalopram oxalate (LEXAPRO) 20 MG tablet    HYDROcodone-acetaminophen (NORCO)  mg per tablet    hydrOXYchloroQUINE (PLAQUENIL) 200 mg tablet    levocetirizine (XYZAL) 5 MG tablet    mycophenolate (CELLCEPT) 500 mg Tab    omeprazole (PRILOSEC) 40 MG capsule    predniSONE (DELTASONE) 5 MG tablet    pregabalin (LYRICA) 25 MG capsule    tiZANidine (ZANAFLEX) 2 MG tablet    Other Relevant Orders    DSDNA    C4 Complement    C3 Complement    Urinalysis    Protein/Creatinine Ratio, Urine    Quantiferon Gold TB    CBC Auto Differential    Comprehensive Metabolic Panel    Vitamin D    Antinuclear Ab, HEp-2 Substrate    Hepatitis B Surface Antigen    Hepatitis C Antibody    TSH    T4, Free       Derm    Discoid lupus erythematosus    Relevant Medications    EScitalopram oxalate (LEXAPRO) 20 MG tablet    HYDROcodone-acetaminophen (NORCO)  mg per tablet    hydrOXYchloroQUINE (PLAQUENIL) 200 mg tablet    levocetirizine (XYZAL) 5 MG tablet    mycophenolate (CELLCEPT)  500 mg Tab    omeprazole (PRILOSEC) 40 MG capsule    predniSONE (DELTASONE) 5 MG tablet    pregabalin (LYRICA) 25 MG capsule    tiZANidine (ZANAFLEX) 2 MG tablet    Other Relevant Orders    DSDNA    C4 Complement    C3 Complement    Urinalysis    Protein/Creatinine Ratio, Urine    Quantiferon Gold TB    CBC Auto Differential    Comprehensive Metabolic Panel    Vitamin D    Antinuclear Ab, HEp-2 Substrate    Hepatitis B Surface Antigen    Hepatitis C Antibody    TSH    T4, Free       Immunology/Multi System    Systemic lupus erythematosus - Primary    Relevant Medications    EScitalopram oxalate (LEXAPRO) 20 MG tablet    HYDROcodone-acetaminophen (NORCO)  mg per tablet    hydrOXYchloroQUINE (PLAQUENIL) 200 mg tablet    levocetirizine (XYZAL) 5 MG tablet    mycophenolate (CELLCEPT) 500 mg Tab    omeprazole (PRILOSEC) 40 MG capsule    predniSONE (DELTASONE) 5 MG tablet    pregabalin (LYRICA) 25 MG capsule    tiZANidine (ZANAFLEX) 2 MG tablet    Other Relevant Orders    DSDNA    C4 Complement    C3 Complement    Urinalysis    Protein/Creatinine Ratio, Urine    Quantiferon Gold TB    CBC Auto Differential    Comprehensive Metabolic Panel    Vitamin D    Antinuclear Ab, HEp-2 Substrate    Hepatitis B Surface Antigen    Hepatitis C Antibody    TSH    T4, Free       Oncology    Benign neoplasm of brain    Relevant Medications    EScitalopram oxalate (LEXAPRO) 20 MG tablet    HYDROcodone-acetaminophen (NORCO)  mg per tablet    hydrOXYchloroQUINE (PLAQUENIL) 200 mg tablet    levocetirizine (XYZAL) 5 MG tablet    mycophenolate (CELLCEPT) 500 mg Tab    omeprazole (PRILOSEC) 40 MG capsule    predniSONE (DELTASONE) 5 MG tablet    pregabalin (LYRICA) 25 MG capsule    tiZANidine (ZANAFLEX) 2 MG tablet    Other Relevant Orders    DSDNA    C4 Complement    C3 Complement    Urinalysis    Protein/Creatinine Ratio, Urine    Quantiferon Gold TB    CBC Auto Differential    Comprehensive Metabolic Panel    Vitamin D     Antinuclear Ab, HEp-2 Substrate    Hepatitis B Surface Antigen    Hepatitis C Antibody    TSH    T4, Free       GI    Gastroesophageal reflux disease    Relevant Medications    EScitalopram oxalate (LEXAPRO) 20 MG tablet    HYDROcodone-acetaminophen (NORCO)  mg per tablet    hydrOXYchloroQUINE (PLAQUENIL) 200 mg tablet    levocetirizine (XYZAL) 5 MG tablet    mycophenolate (CELLCEPT) 500 mg Tab    omeprazole (PRILOSEC) 40 MG capsule    predniSONE (DELTASONE) 5 MG tablet    pregabalin (LYRICA) 25 MG capsule    tiZANidine (ZANAFLEX) 2 MG tablet    Other Relevant Orders    DSDNA    C4 Complement    C3 Complement    Urinalysis    Protein/Creatinine Ratio, Urine    Quantiferon Gold TB    CBC Auto Differential    Comprehensive Metabolic Panel    Vitamin D    Antinuclear Ab, HEp-2 Substrate    Hepatitis B Surface Antigen    Hepatitis C Antibody    TSH    T4, Free       Orthopedic    Fibromyalgia    Relevant Medications    EScitalopram oxalate (LEXAPRO) 20 MG tablet    HYDROcodone-acetaminophen (NORCO)  mg per tablet    hydrOXYchloroQUINE (PLAQUENIL) 200 mg tablet    levocetirizine (XYZAL) 5 MG tablet    mycophenolate (CELLCEPT) 500 mg Tab    omeprazole (PRILOSEC) 40 MG capsule    predniSONE (DELTASONE) 5 MG tablet    pregabalin (LYRICA) 25 MG capsule    tiZANidine (ZANAFLEX) 2 MG tablet    Other Relevant Orders    DSDNA    C4 Complement    C3 Complement    Urinalysis    Protein/Creatinine Ratio, Urine    Quantiferon Gold TB    CBC Auto Differential    Comprehensive Metabolic Panel    Vitamin D    Antinuclear Ab, HEp-2 Substrate    Hepatitis B Surface Antigen    Hepatitis C Antibody    TSH    T4, Free       Other    Insomnia    Relevant Medications    EScitalopram oxalate (LEXAPRO) 20 MG tablet    HYDROcodone-acetaminophen (NORCO)  mg per tablet    hydrOXYchloroQUINE (PLAQUENIL) 200 mg tablet    levocetirizine (XYZAL) 5 MG tablet    mycophenolate (CELLCEPT) 500 mg Tab    omeprazole (PRILOSEC) 40 MG capsule     predniSONE (DELTASONE) 5 MG tablet    pregabalin (LYRICA) 25 MG capsule    tiZANidine (ZANAFLEX) 2 MG tablet    Other Relevant Orders    DSDNA    C4 Complement    C3 Complement    Urinalysis    Protein/Creatinine Ratio, Urine    Quantiferon Gold TB    CBC Auto Differential    Comprehensive Metabolic Panel    Vitamin D    Antinuclear Ab, HEp-2 Substrate    Hepatitis B Surface Antigen    Hepatitis C Antibody    TSH    T4, Free     Other Visit Diagnoses       Undifferentiated connective tissue disease        Relevant Medications    EScitalopram oxalate (LEXAPRO) 20 MG tablet    HYDROcodone-acetaminophen (NORCO)  mg per tablet    hydrOXYchloroQUINE (PLAQUENIL) 200 mg tablet    levocetirizine (XYZAL) 5 MG tablet    mycophenolate (CELLCEPT) 500 mg Tab    omeprazole (PRILOSEC) 40 MG capsule    predniSONE (DELTASONE) 5 MG tablet    pregabalin (LYRICA) 25 MG capsule    tiZANidine (ZANAFLEX) 2 MG tablet    Other Relevant Orders    DSDNA    C4 Complement    C3 Complement    Urinalysis    Protein/Creatinine Ratio, Urine    Quantiferon Gold TB    CBC Auto Differential    Comprehensive Metabolic Panel    Vitamin D    Antinuclear Ab, HEp-2 Substrate    Hepatitis B Surface Antigen    Hepatitis C Antibody    TSH    T4, Free

## 2023-01-06 ENCOUNTER — HOSPITAL ENCOUNTER (OUTPATIENT)
Facility: HOSPITAL | Age: 38
Discharge: HOME | End: 2023-01-08
Attending: STUDENT IN AN ORGANIZED HEALTH CARE EDUCATION/TRAINING PROGRAM | Admitting: STUDENT IN AN ORGANIZED HEALTH CARE EDUCATION/TRAINING PROGRAM
Payer: MEDICAID

## 2023-01-06 DIAGNOSIS — M51.37 DDD (DEGENERATIVE DISC DISEASE), LUMBOSACRAL: ICD-10-CM

## 2023-01-06 DIAGNOSIS — K21.9 GASTROESOPHAGEAL REFLUX DISEASE, UNSPECIFIED WHETHER ESOPHAGITIS PRESENT: ICD-10-CM

## 2023-01-06 DIAGNOSIS — I49.9 ARRHYTHMIA: ICD-10-CM

## 2023-01-06 DIAGNOSIS — R42 DIZZINESS: ICD-10-CM

## 2023-01-06 DIAGNOSIS — R00.0 TACHYCARDIA: Primary | ICD-10-CM

## 2023-01-06 DIAGNOSIS — M79.7 FIBROMYALGIA: ICD-10-CM

## 2023-01-06 DIAGNOSIS — F51.01 PRIMARY INSOMNIA: ICD-10-CM

## 2023-01-06 DIAGNOSIS — M50.30 DEGENERATIVE CERVICAL DISC: ICD-10-CM

## 2023-01-06 DIAGNOSIS — M32.9 SYSTEMIC LUPUS ERYTHEMATOSUS, UNSPECIFIED SLE TYPE, UNSPECIFIED ORGAN INVOLVEMENT STATUS: ICD-10-CM

## 2023-01-06 DIAGNOSIS — R55 NEAR SYNCOPE: ICD-10-CM

## 2023-01-06 DIAGNOSIS — R07.9 CHEST PAIN: ICD-10-CM

## 2023-01-06 DIAGNOSIS — L93.0 DISCOID LUPUS ERYTHEMATOSUS: ICD-10-CM

## 2023-01-06 DIAGNOSIS — M35.9 UNDIFFERENTIATED CONNECTIVE TISSUE DISEASE: ICD-10-CM

## 2023-01-06 DIAGNOSIS — R42 LIGHTHEADEDNESS: ICD-10-CM

## 2023-01-06 DIAGNOSIS — D33.2 BENIGN NEOPLASM OF BRAIN, UNSPECIFIED BRAIN REGION: ICD-10-CM

## 2023-01-06 LAB
ALBUMIN SERPL-MCNC: 4.2 G/DL (ref 3.5–5)
ALBUMIN/GLOB SERPL: 1.2 RATIO (ref 1.1–2)
ALP SERPL-CCNC: 68 UNIT/L (ref 40–150)
ALT SERPL-CCNC: 16 UNIT/L (ref 0–55)
APPEARANCE UR: CLEAR
AST SERPL-CCNC: 15 UNIT/L (ref 5–34)
AV INDEX (PROSTH): 0.78
AV MEAN GRADIENT: 3 MMHG
AV PEAK GRADIENT: 6 MMHG
AV VALVE AREA: 2.69 CM2
AV VELOCITY RATIO: 0.76
BACTERIA #/AREA URNS AUTO: ABNORMAL /HPF
BASOPHILS # BLD AUTO: 0.03 X10(3)/MCL (ref 0–0.2)
BASOPHILS NFR BLD AUTO: 0.4 %
BILIRUB UR QL STRIP.AUTO: NEGATIVE MG/DL
BILIRUBIN DIRECT+TOT PNL SERPL-MCNC: 0.2 MG/DL
BNP BLD-MCNC: <10 PG/ML
BSA FOR ECHO PROCEDURE: 2.02 M2
BUN SERPL-MCNC: 21 MG/DL (ref 7–18.7)
CALCIUM SERPL-MCNC: 9.7 MG/DL (ref 8.4–10.2)
CHLORIDE SERPL-SCNC: 103 MMOL/L (ref 98–107)
CO2 SERPL-SCNC: 29 MMOL/L (ref 22–29)
COLOR UR AUTO: YELLOW
CREAT SERPL-MCNC: 0.84 MG/DL (ref 0.55–1.02)
CV ECHO LV RWT: 0.52 CM
DOP CALC AO PEAK VEL: 1.23 M/S
DOP CALC AO VTI: 23.3 CM
DOP CALC LVOT AREA: 3.5 CM2
DOP CALC LVOT DIAMETER: 2.1 CM
DOP CALC LVOT PEAK VEL: 0.94 M/S
DOP CALC LVOT STROKE VOLUME: 62.66 CM3
DOP CALC MV VTI: 16.2 CM
DOP CALCLVOT PEAK VEL VTI: 18.1 CM
E WAVE DECELERATION TIME: 169 MSEC
E/A RATIO: 0.76
E/E' RATIO: 4.86 M/S
ECHO LV POSTERIOR WALL: 1.09 CM (ref 0.6–1.1)
EJECTION FRACTION: 60 %
EOSINOPHIL # BLD AUTO: 0.12 X10(3)/MCL (ref 0–0.9)
EOSINOPHIL NFR BLD AUTO: 1.5 %
ERYTHROCYTE [DISTWIDTH] IN BLOOD BY AUTOMATED COUNT: 11.9 % (ref 11–14.5)
FLUAV AG UPPER RESP QL IA.RAPID: NOT DETECTED
FLUBV AG UPPER RESP QL IA.RAPID: NOT DETECTED
FRACTIONAL SHORTENING: 40 % (ref 28–44)
GFR SERPLBLD CREATININE-BSD FMLA CKD-EPI: >90 MLS/MIN/1.73/M2
GLOBULIN SER-MCNC: 3.4 GM/DL (ref 2.4–3.5)
GLUCOSE SERPL-MCNC: 94 MG/DL (ref 74–100)
GLUCOSE UR QL STRIP.AUTO: NEGATIVE MG/DL
HCT VFR BLD AUTO: 37.3 % (ref 37–47)
HGB BLD-MCNC: 13.1 GM/DL (ref 12–16)
IMM GRANULOCYTES # BLD AUTO: 0.04 X10(3)/MCL (ref 0–0.04)
IMM GRANULOCYTES NFR BLD AUTO: 0.5 %
INTERVENTRICULAR SEPTUM: 1.15 CM (ref 0.6–1.1)
KETONES UR QL STRIP.AUTO: NEGATIVE MG/DL
LEFT ATRIUM SIZE: 3 CM
LEFT ATRIUM VOLUME INDEX MOD: 21.3 ML/M2
LEFT ATRIUM VOLUME MOD: 42 CM3
LEFT INTERNAL DIMENSION IN SYSTOLE: 2.51 CM (ref 2.1–4)
LEFT VENTRICLE DIASTOLIC VOLUME INDEX: 39.9 ML/M2
LEFT VENTRICLE DIASTOLIC VOLUME: 78.6 ML
LEFT VENTRICLE MASS INDEX: 82 G/M2
LEFT VENTRICLE SYSTOLIC VOLUME INDEX: 11.4 ML/M2
LEFT VENTRICLE SYSTOLIC VOLUME: 22.5 ML
LEFT VENTRICULAR INTERNAL DIMENSION IN DIASTOLE: 4.2 CM (ref 3.5–6)
LEFT VENTRICULAR MASS: 161.18 G
LEUKOCYTE ESTERASE UR QL STRIP.AUTO: NEGATIVE UNIT/L
LV LATERAL E/E' RATIO: 3.92 M/S
LV SEPTAL E/E' RATIO: 6.38 M/S
LVOT MG: 2 MMHG
LVOT MV: 0.62 CM/S
LYMPHOCYTES # BLD AUTO: 3.33 X10(3)/MCL (ref 0.6–4.6)
LYMPHOCYTES NFR BLD AUTO: 41.5 %
MAGNESIUM SERPL-MCNC: 1.9 MG/DL (ref 1.6–2.6)
MCH RBC QN AUTO: 30.1 PG
MCHC RBC AUTO-ENTMCNC: 35.1 MG/DL (ref 33–36)
MCV RBC AUTO: 85.7 FL (ref 80–94)
MONOCYTES # BLD AUTO: 0.74 X10(3)/MCL (ref 0.1–1.3)
MONOCYTES NFR BLD AUTO: 9.2 %
MV MEAN GRADIENT: 1 MMHG
MV PEAK A VEL: 0.67 M/S
MV PEAK E VEL: 0.51 M/S
MV PEAK GRADIENT: 2 MMHG
MV VALVE AREA BY CONTINUITY EQUATION: 3.87 CM2
NEUTROPHILS # BLD AUTO: 3.77 X10(3)/MCL (ref 2.1–9.2)
NEUTROPHILS NFR BLD AUTO: 46.9 %
NITRITE UR QL STRIP.AUTO: NEGATIVE
NRBC BLD AUTO-RTO: 0 % (ref 0–1)
PH UR STRIP.AUTO: 6 [PH]
PLATELET # BLD AUTO: 261 X10(3)/MCL (ref 140–371)
PMV BLD AUTO: 10.5 FL (ref 9.4–12.4)
POCT GLUCOSE: 85 MG/DL (ref 70–110)
POTASSIUM SERPL-SCNC: 4.5 MMOL/L (ref 3.5–5.1)
PROT SERPL-MCNC: 7.6 GM/DL (ref 6.4–8.3)
PROT UR QL STRIP.AUTO: NEGATIVE MG/DL
RA PRESSURE: 3 MMHG
RBC # BLD AUTO: 4.35 X10(6)/MCL (ref 4.2–5.4)
RBC #/AREA URNS AUTO: <5 /HPF
RBC UR QL AUTO: NEGATIVE UNIT/L
RIGHT VENTRICULAR END-DIASTOLIC DIMENSION: 3.08 CM
SARS-COV-2 RNA RESP QL NAA+PROBE: DETECTED
SODIUM SERPL-SCNC: 140 MMOL/L (ref 136–145)
SP GR UR STRIP.AUTO: 1.01 (ref 1–1.03)
SQUAMOUS #/AREA URNS AUTO: <5 /HPF
TDI LATERAL: 0.13 M/S
TDI SEPTAL: 0.08 M/S
TDI: 0.11 M/S
TRICUSPID ANNULAR PLANE SYSTOLIC EXCURSION: 2.54 CM
TROPONIN I SERPL-MCNC: <0.01 NG/ML (ref 0–0.04)
TROPONIN I SERPL-MCNC: <0.01 NG/ML (ref 0–0.04)
TSH SERPL-ACNC: 4.92 UIU/ML (ref 0.35–4.94)
UROBILINOGEN UR STRIP-ACNC: 0.2 MG/DL
WBC # SPEC AUTO: 8 X10(3)/MCL (ref 4.5–11.5)
WBC #/AREA URNS AUTO: <5 /HPF

## 2023-01-06 PROCEDURE — 96360 HYDRATION IV INFUSION INIT: CPT

## 2023-01-06 PROCEDURE — 93010 ELECTROCARDIOGRAM REPORT: CPT | Mod: ,,, | Performed by: INTERNAL MEDICINE

## 2023-01-06 PROCEDURE — 25000003 PHARM REV CODE 250: Performed by: INTERNAL MEDICINE

## 2023-01-06 PROCEDURE — 25000003 PHARM REV CODE 250: Performed by: STUDENT IN AN ORGANIZED HEALTH CARE EDUCATION/TRAINING PROGRAM

## 2023-01-06 PROCEDURE — G0378 HOSPITAL OBSERVATION PER HR: HCPCS

## 2023-01-06 PROCEDURE — 0240U COVID/FLU A&B PCR: CPT | Performed by: STUDENT IN AN ORGANIZED HEALTH CARE EDUCATION/TRAINING PROGRAM

## 2023-01-06 PROCEDURE — 80053 COMPREHEN METABOLIC PANEL: CPT | Performed by: STUDENT IN AN ORGANIZED HEALTH CARE EDUCATION/TRAINING PROGRAM

## 2023-01-06 PROCEDURE — 84443 ASSAY THYROID STIM HORMONE: CPT | Performed by: STUDENT IN AN ORGANIZED HEALTH CARE EDUCATION/TRAINING PROGRAM

## 2023-01-06 PROCEDURE — 83880 ASSAY OF NATRIURETIC PEPTIDE: CPT | Performed by: STUDENT IN AN ORGANIZED HEALTH CARE EDUCATION/TRAINING PROGRAM

## 2023-01-06 PROCEDURE — 84484 ASSAY OF TROPONIN QUANT: CPT | Performed by: STUDENT IN AN ORGANIZED HEALTH CARE EDUCATION/TRAINING PROGRAM

## 2023-01-06 PROCEDURE — 93010 EKG 12-LEAD: ICD-10-PCS | Mod: ,,, | Performed by: INTERNAL MEDICINE

## 2023-01-06 PROCEDURE — 81001 URINALYSIS AUTO W/SCOPE: CPT | Performed by: STUDENT IN AN ORGANIZED HEALTH CARE EDUCATION/TRAINING PROGRAM

## 2023-01-06 PROCEDURE — 63600175 PHARM REV CODE 636 W HCPCS: Performed by: NURSE PRACTITIONER

## 2023-01-06 PROCEDURE — 93005 ELECTROCARDIOGRAM TRACING: CPT

## 2023-01-06 PROCEDURE — 83735 ASSAY OF MAGNESIUM: CPT | Performed by: STUDENT IN AN ORGANIZED HEALTH CARE EDUCATION/TRAINING PROGRAM

## 2023-01-06 PROCEDURE — 82962 GLUCOSE BLOOD TEST: CPT

## 2023-01-06 PROCEDURE — 84484 ASSAY OF TROPONIN QUANT: CPT | Performed by: NURSE PRACTITIONER

## 2023-01-06 PROCEDURE — 85025 COMPLETE CBC W/AUTO DIFF WBC: CPT | Performed by: STUDENT IN AN ORGANIZED HEALTH CARE EDUCATION/TRAINING PROGRAM

## 2023-01-06 PROCEDURE — 96372 THER/PROPH/DIAG INJ SC/IM: CPT | Mod: 59 | Performed by: NURSE PRACTITIONER

## 2023-01-06 PROCEDURE — 99285 EMERGENCY DEPT VISIT HI MDM: CPT | Mod: 25

## 2023-01-06 RX ORDER — ESCITALOPRAM OXALATE 10 MG/1
20 TABLET ORAL DAILY
Status: DISCONTINUED | OUTPATIENT
Start: 2023-01-07 | End: 2023-01-08 | Stop reason: HOSPADM

## 2023-01-06 RX ORDER — DIPHENHYDRAMINE HYDROCHLORIDE 50 MG/ML
25 INJECTION INTRAMUSCULAR; INTRAVENOUS
Status: DISPENSED | OUTPATIENT
Start: 2023-01-06 | End: 2023-01-06

## 2023-01-06 RX ORDER — CLONAZEPAM 1 MG/1
1 TABLET ORAL
Status: COMPLETED | OUTPATIENT
Start: 2023-01-06 | End: 2023-01-06

## 2023-01-06 RX ORDER — ACETAMINOPHEN 325 MG/1
650 TABLET ORAL EVERY 6 HOURS PRN
Status: DISCONTINUED | OUTPATIENT
Start: 2023-01-06 | End: 2023-01-08 | Stop reason: HOSPADM

## 2023-01-06 RX ORDER — ENOXAPARIN SODIUM 100 MG/ML
40 INJECTION SUBCUTANEOUS EVERY 24 HOURS
Status: DISCONTINUED | OUTPATIENT
Start: 2023-01-06 | End: 2023-01-08 | Stop reason: HOSPADM

## 2023-01-06 RX ORDER — CLONAZEPAM 1 MG/1
1 TABLET ORAL 3 TIMES DAILY
Status: DISCONTINUED | OUTPATIENT
Start: 2023-01-06 | End: 2023-01-08 | Stop reason: HOSPADM

## 2023-01-06 RX ORDER — ONDANSETRON 2 MG/ML
4 INJECTION INTRAMUSCULAR; INTRAVENOUS EVERY 4 HOURS PRN
Status: DISCONTINUED | OUTPATIENT
Start: 2023-01-06 | End: 2023-01-08 | Stop reason: HOSPADM

## 2023-01-06 RX ORDER — PREGABALIN 25 MG/1
25 CAPSULE ORAL 2 TIMES DAILY
Status: DISCONTINUED | OUTPATIENT
Start: 2023-01-06 | End: 2023-01-08 | Stop reason: HOSPADM

## 2023-01-06 RX ORDER — MAG HYDROX/ALUMINUM HYD/SIMETH 200-200-20
30 SUSPENSION, ORAL (FINAL DOSE FORM) ORAL 4 TIMES DAILY PRN
Status: DISCONTINUED | OUTPATIENT
Start: 2023-01-06 | End: 2023-01-08 | Stop reason: HOSPADM

## 2023-01-06 RX ORDER — HYDROCODONE BITARTRATE AND ACETAMINOPHEN 10; 325 MG/1; MG/1
1 TABLET ORAL EVERY 4 HOURS PRN
Status: DISCONTINUED | OUTPATIENT
Start: 2023-01-06 | End: 2023-01-08 | Stop reason: HOSPADM

## 2023-01-06 RX ORDER — FLUTICASONE PROPIONATE 50 MCG
1 SPRAY, SUSPENSION (ML) NASAL DAILY
Status: DISCONTINUED | OUTPATIENT
Start: 2023-01-07 | End: 2023-01-08 | Stop reason: HOSPADM

## 2023-01-06 RX ORDER — TALC
6 POWDER (GRAM) TOPICAL NIGHTLY PRN
Status: DISCONTINUED | OUTPATIENT
Start: 2023-01-06 | End: 2023-01-08 | Stop reason: HOSPADM

## 2023-01-06 RX ORDER — SODIUM CHLORIDE 9 MG/ML
1000 INJECTION, SOLUTION INTRAVENOUS
Status: COMPLETED | OUTPATIENT
Start: 2023-01-06 | End: 2023-01-06

## 2023-01-06 RX ORDER — PANTOPRAZOLE SODIUM 40 MG/1
40 TABLET, DELAYED RELEASE ORAL DAILY
Status: DISCONTINUED | OUTPATIENT
Start: 2023-01-07 | End: 2023-01-08 | Stop reason: HOSPADM

## 2023-01-06 RX ORDER — BUPROPION HYDROCHLORIDE 150 MG/1
300 TABLET ORAL EVERY MORNING
Status: DISCONTINUED | OUTPATIENT
Start: 2023-01-07 | End: 2023-01-07

## 2023-01-06 RX ORDER — SIMETHICONE 80 MG
1 TABLET,CHEWABLE ORAL 4 TIMES DAILY PRN
Status: DISCONTINUED | OUTPATIENT
Start: 2023-01-06 | End: 2023-01-08 | Stop reason: HOSPADM

## 2023-01-06 RX ORDER — TIZANIDINE 2 MG/1
2 TABLET ORAL NIGHTLY
Status: DISCONTINUED | OUTPATIENT
Start: 2023-01-06 | End: 2023-01-08 | Stop reason: HOSPADM

## 2023-01-06 RX ORDER — PROCHLORPERAZINE EDISYLATE 5 MG/ML
5 INJECTION INTRAMUSCULAR; INTRAVENOUS EVERY 6 HOURS PRN
Status: DISCONTINUED | OUTPATIENT
Start: 2023-01-06 | End: 2023-01-08 | Stop reason: HOSPADM

## 2023-01-06 RX ORDER — HYDROXYCHLOROQUINE SULFATE 200 MG/1
200 TABLET, FILM COATED ORAL 2 TIMES DAILY
Status: DISCONTINUED | OUTPATIENT
Start: 2023-01-06 | End: 2023-01-08 | Stop reason: HOSPADM

## 2023-01-06 RX ORDER — POLYETHYLENE GLYCOL 3350 17 G/17G
17 POWDER, FOR SOLUTION ORAL 2 TIMES DAILY PRN
Status: DISCONTINUED | OUTPATIENT
Start: 2023-01-06 | End: 2023-01-08 | Stop reason: HOSPADM

## 2023-01-06 RX ORDER — NALOXONE HCL 0.4 MG/ML
0.02 VIAL (ML) INJECTION
Status: DISCONTINUED | OUTPATIENT
Start: 2023-01-06 | End: 2023-01-08 | Stop reason: HOSPADM

## 2023-01-06 RX ORDER — ALBUTEROL SULFATE 90 UG/1
2 AEROSOL, METERED RESPIRATORY (INHALATION) EVERY 6 HOURS PRN
Status: DISCONTINUED | OUTPATIENT
Start: 2023-01-06 | End: 2023-01-08 | Stop reason: HOSPADM

## 2023-01-06 RX ORDER — MYCOPHENOLATE MOFETIL 500 MG/1
1000 TABLET ORAL 2 TIMES DAILY
Status: DISCONTINUED | OUTPATIENT
Start: 2023-01-06 | End: 2023-01-08 | Stop reason: HOSPADM

## 2023-01-06 RX ADMIN — CLONAZEPAM 1 MG: 1 TABLET ORAL at 06:01

## 2023-01-06 RX ADMIN — HYDROXYCHLOROQUINE SULFATE 200 MG: 200 TABLET ORAL at 08:01

## 2023-01-06 RX ADMIN — ENOXAPARIN SODIUM 40 MG: 40 INJECTION SUBCUTANEOUS at 04:01

## 2023-01-06 RX ADMIN — CLONAZEPAM 1 MG: 1 TABLET ORAL at 03:01

## 2023-01-06 RX ADMIN — CLONAZEPAM 1 MG: 1 TABLET ORAL at 08:01

## 2023-01-06 RX ADMIN — SODIUM CHLORIDE 1000 ML: 9 INJECTION, SOLUTION INTRAVENOUS at 04:01

## 2023-01-06 NOTE — ED PROVIDER NOTES
Encounter Date: 1/6/2023    SCRIBE #1 NOTE: I, Juliet Cedeno, am scribing for, and in the presence of,  Adam Martínez IV, MD. I have scribed the following portions of the note - the EKG reading. Other sections scribed: HPI, ROS, PE, MDM.     History     Chief Complaint   Patient presents with    Dizziness     (+) COVID x1 wk ago, pt states cleared to return to work today, after she got home, she experienced a dizzy spell, denies LOC. Pt took bp/hr/o2 @ home during dizzy spell (134/95, HR-118, O2-96%), pt appears very anxious in triage, takes PRN Klonipin for anxiety, did not take today. Tolerating PO intake, denies decreased appetite, fever, diarrhea, n/v     37 year old female with history of a brain mass, anxiety, OCD, ADHD, GERD, seizures, SLE, and fibromyalgia presents to the ED with complaints of a near-syncope event. Pt reports that she had COVID last week and yesterday (1/5/23) was her first day back to work. When she got home from work, she was cleaning her house when she got dizzy and felt like she was going to pass out. Per triage note, she checked her blood pressure, heart rate, and oxygen at home. Her BP was 130/90, heart rate was 118, and her o2 sat was 96% at home. She is on Klonopin but did not take any yesterday. She also complains of abdominal and back pain that began last week, frequent urination, dry cough, and a rash that began after using a blanket that a cat laid on. She notes that she saw her rheumatologist yesterday and told him about the pain and he increased her Norco dosage. She denies symptoms of dysuria, headache, chest pain, and SOB.     Chart review: pt was seen by her rheumatologist yesterday for her Lupus follow up.     The history is provided by the patient. No  was used.   Review of patient's allergies indicates:   Allergen Reactions    Ibuprofen Anaphylaxis    Metoclopramide Anaphylaxis and Other (See Comments)     Other reaction(s): Paralysis of vertical  movement      Carisoprodol Other (See Comments)    Gabapentin Other (See Comments)     Other reaction(s): C/O - a headache      Clindamycin Rash    Cyclobenzaprine Palpitations     Other reaction(s): SOB and increased heart rate      Morphine Itching    Penicillin v potassium Rash     Other reaction(s): Rash      Rizatriptan Nausea And Vomiting     Past Medical History:   Diagnosis Date    ADHD (attention deficit hyperactivity disorder)     Allergy     Anxiety     Atopic dermatitis 2018    Brain tumor 2008    Endometriosis     GERD (gastroesophageal reflux disease)     IC (interstitial cystitis)     Obsessive-compulsive disorder     Seizures      Past Surgical History:   Procedure Laterality Date    AUGMENTATION OF BREAST      CERVICAL FUSION      HYSTERECTOMY      LAPAROSCOPIC DRAINAGE OF CYST OF OVARY      NASAL SEPTOPLASTY  2007     Family History   Problem Relation Age of Onset    Drug abuse Mother     Hypertension Mother     Heart disease Father     Hypertension Father     Alcohol abuse Father     Early death Father      Social History     Tobacco Use    Smoking status: Former    Smokeless tobacco: Never   Substance Use Topics    Alcohol use: Yes    Drug use: Never     Review of Systems   Constitutional:  Negative for chills and fever.   HENT:  Negative for congestion, rhinorrhea and sore throat.    Eyes:  Negative for visual disturbance.   Respiratory:  Positive for cough. Negative for shortness of breath.    Cardiovascular:  Negative for chest pain and leg swelling.   Gastrointestinal:  Positive for abdominal pain. Negative for nausea and vomiting.   Genitourinary:  Positive for frequency. Negative for dysuria, hematuria, vaginal bleeding and vaginal discharge.   Musculoskeletal:  Positive for back pain. Negative for joint swelling.   Skin:  Negative for rash.   Neurological:  Positive for dizziness. Negative for weakness.   Psychiatric/Behavioral:  Negative for confusion.      Physical Exam     Initial  Vitals [01/06/23 0057]   BP Pulse Resp Temp SpO2   (!) 152/110 96 19 98.1 °F (36.7 °C) 99 %      MAP       --         Physical Exam    Nursing note and vitals reviewed.  Constitutional: She is not diaphoretic. No distress.   HENT:   Head: Normocephalic and atraumatic.   Eyes: EOM are normal. Pupils are equal, round, and reactive to light.   Neck: Neck supple.   Normal range of motion.  Cardiovascular:  Normal rate and regular rhythm.           No murmur heard.  Pulmonary/Chest: Breath sounds normal. No respiratory distress. She has no wheezes. She has no rales.   Abdominal: Abdomen is soft. She exhibits no distension. There is no abdominal tenderness.   Musculoskeletal:         General: Normal range of motion.      Cervical back: Normal range of motion and neck supple.     Neurological: She is alert and oriented to person, place, and time. She has normal strength.   Skin: Skin is warm. No rash noted.   Splotchy urticaria to face    Psychiatric:   Anxious        ED Course   Procedures  Labs Reviewed   COVID/FLU A&B PCR - Abnormal; Notable for the following components:       Result Value    SARS-CoV-2 PCR Detected (*)     All other components within normal limits    Narrative:     The Xpert Xpress SARS-CoV-2/FLU/RSV plus is a rapid, multiplexed real-time PCR test intended for the simultaneous qualitative detection and differentiation of SARS-CoV-2, Influenza A, Influenza B, and respiratory syncytial virus (RSV) viral RNA in either nasopharyngeal swab or nasal swab specimens.         COMPREHENSIVE METABOLIC PANEL - Abnormal; Notable for the following components:    Blood Urea Nitrogen 21.0 (*)     All other components within normal limits   URINALYSIS, MICROSCOPIC - Abnormal; Notable for the following components:    Bacteria, UA 1+ (*)     All other components within normal limits   URINALYSIS, REFLEX TO URINE CULTURE - Normal   TROPONIN I - Normal   B-TYPE NATRIURETIC PEPTIDE - Normal   MAGNESIUM - Normal   CBC W/  AUTO DIFFERENTIAL    Narrative:     The following orders were created for panel order CBC auto differential.  Procedure                               Abnormality         Status                     ---------                               -----------         ------                     CBC with Differential[[id number]]                            Final result                 Please view results for these tests on the individual orders.   CBC WITH DIFFERENTIAL   TSH   POCT GLUCOSE, HAND-HELD DEVICE   POCT GLUCOSE     EKG Readings: (Independently Interpreted)   Rhythm: Normal Sinus Rhythm. Heart Rate: 80. Ectopy: No Ectopy. Conduction: Normal. ST Segments: Normal ST Segments. T Waves: Normal. Clinical Impression: Normal Sinus Rhythm   EKG performed at 00:59 on 1/6/23.    Repeat EKG performed at 4:58. Rate of 85. NSR. Normal EKG.      Imaging Results              CT Head Without Contrast (Final result)  Result time 01/06/23 06:45:09      Final result by Justin Burton MD (01/06/23 06:45:09)                   Impression:      No acute intracranial abnormality identified.Hyperdense midline mass is unchanged.      Electronically signed by: Justin Burton  Date:    01/06/2023  Time:    06:45               Narrative:    EXAMINATION:  CT HEAD WITHOUT CONTRAST    CLINICAL HISTORY:  dizziness, history brain mass;    TECHNIQUE:  Low dose axial images were obtained through the head.  Coronal and sagittal reformations were also performed. Contrast was not administered.    Automatic exposure control was utilized to reduce the patient's radiation dose.    DLP= 1142    COMPARISON:  06/09/2019    FINDINGS:  No acute intracranial hemorrhage.  Hyperdense midline mass is unchanged.    There is no hydrocephalus, evidence of herniation or midline shift. The ventricles and sulci are normal.    There is normal gray white differentiation.    The osseous structures are normal.    The mastoid air cells are clear.    The auditory canals are  patent bilaterally.    The globes and orbital contents are normal bilaterally.    The visualized maxillary, ethmoid and sphenoid sinuses are clear.                                       X-Ray Chest 1 View (Final result)  Result time 01/06/23 06:27:33      Final result by Justin Burton MD (01/06/23 06:27:33)                   Impression:      No acute cardiopulmonary process.      Electronically signed by: Justin Burton  Date:    01/06/2023  Time:    06:27               Narrative:    EXAMINATION:  XR CHEST 1 VIEW    CLINICAL HISTORY:  Dizziness and giddiness    TECHNIQUE:  Single view of the chest    COMPARISON:  09/13/2022    FINDINGS:  No focal opacification, pleural effusion, or pneumothorax.    The cardiomediastinal silhouette is within normal limits.    No acute osseous abnormality.                                    X-Rays:   Independently Interpreted Readings:   Chest X-Ray: No acute abnormalities.   Head CT: No hemorrhage.   Medications   diphenhydrAMINE injection 25 mg (25 mg Intravenous Not Given 1/6/23 0500)   melatonin tablet 6 mg (has no administration in time range)   ondansetron injection 4 mg (has no administration in time range)   prochlorperazine injection Soln 5 mg (has no administration in time range)   polyethylene glycol packet 17 g (has no administration in time range)   acetaminophen tablet 650 mg (has no administration in time range)   simethicone chewable tablet 80 mg (has no administration in time range)   aluminum-magnesium hydroxide-simethicone 200-200-20 mg/5 mL suspension 30 mL (has no administration in time range)   naloxone 0.4 mg/mL injection 0.02 mg (has no administration in time range)   enoxaparin injection 40 mg (has no administration in time range)   0.9%  NaCl infusion (0 mLs Intravenous Stopped 1/6/23 0554)   clonazePAM tablet 1 mg (1 mg Oral Given 1/6/23 0644)     Medical Decision Making:   History:   Old Medical Records: I decided to obtain old medical records.  Old  Records Summarized: records from clinic visits.       <> Summary of Records: Chart review: pt was seen by her rheumatologist yesterday for her Lupus follow up.     Initial Assessment:   37-year-old with anxiety lupus fibromyalgia presenting for lightheadedness presyncope.  Diagnosed with COVID a week ago.  Patient also complaining of some vague abdominal back pain.  Well-appearing labs within normal limits 1+ bacteria urine she does complain of some polyuria.  Will obtain orthostatics hydrate. she also has some mild erythema to her face that she reports as an allergic reaction to her CT denies other symptoms will give her some Benadryl as well  Differential Diagnosis:   Dehydration, arrhythmia, electrolyte derangement, infection, acs  Independently Interpreted Test(s):   I have ordered and independently interpreted X-rays - see prior notes.  I have ordered and independently interpreted EKG Reading(s) - see prior notes  Clinical Tests:   Lab Tests: Ordered and Reviewed  Radiological Study: Ordered and Reviewed  Medical Tests: Ordered and Reviewed  ED Management:  MDM  Complexity of problems addressed  Co-morbidities and/or factors adding to the complexity or risk for the patient: SLE, anxiety, brain mass   Problems addressed: Lightheadedness, chest pain, abdominal pain, presyncope   Acute problem/illness or progression/exacerbation of chronic problem with potential threat to life/bodily dysfunction?: yes  Differential diagnoses/problems considered: see above     Complexity of data reviewed  Independent historian (EMS, parent/caregiver, family/friend): no  Decision to obtain previous or outside records?: yes  Chart Review (nursing home, outside records, CareEverywhere): see above  Review of Rx medications: home medications reviewed  Labs/imaging/other tests obtained/considered (risk/benefits of testing discussed): labs ordered, CT head, CXR ordered   My EKG Interpretation: see above  Labs/tests intepretation: Wnl   My  independent imaging interpretation: see above   Point of care US done/interpretation: n/a  Discussion with consults/radiologist/social work: Hospitalist for admission     Risk of patient management  Treatment/interventions, IV medications, new prescriptions given: IVF   High risk management (IV controlled substances, admission, plans for OR, DNR, meds requiring monitoring, transfer, etc)?: yes admission   Workup/treatment affected by social determinants of health?:no   Advanced care planning/DNR/end of life discussion: no  Shared decision making: yes       Other:   I have discussed this case with another health care provider.       <> Summary of the Discussion: Hospitalist           Attending Attestation:           Physician Attestation for Scribe:  Physician Attestation Statement for Scribe #1: IAdam IV, MD, reviewed documentation, as scribed by Juliet Cedeno in my presence, and it is both accurate and complete.           ED Course as of 01/06/23 0748   Fri Jan 06, 2023   0451 SARS-CoV2 (COVID-19) Qualitative PCR(!): Detected [AC]   0456 Nursing made me aware that patient had a run of tachycardia up to 150s 160s - patient reported feeling lightheaded again like she felt earlier today that prompted her ER visit.  I have reviewed the monitor it appears to be a narrow complex regular tachycardia favor SVT versus sinus tach versus AFib RVR patient is now back at a normal rate and rhythm.  Will obtain a repeat EKG at this time send cardiac biomarkers low threshold for admission and observation on telemetry at this time given this finding in the ER.  [AC]      ED Course User Index  [AC] Adam Martínez IV, MD                   Clinical Impression:   Final diagnoses:  [R42] Dizziness  [I49.9] Arrhythmia  [R00.0] Tachycardia (Primary)  [R42] Lightheadedness        ED Disposition Condition    Observation         Adam THOMAS MD personally performed the history, PE, MDM, and procedures as documented above and  agree with the scribe's documentation.           Adam Martínez IV, MD  01/06/23 1283

## 2023-01-06 NOTE — H&P
[facility] Hospital Medicine History & Physical Examination       Patient Name: Mary Fernandez  MRN: [id number]  Patient Class: OP- Observation   Admission Date: 1/6/2023   Admitting Physician: KYLER Service   Length of Stay: 0  Attending Physician: Dr. Franklin Vazquez  Primary Care Provider: Robyn Strickland MD  Face-to-Face encounter date: 01/06/2023  Code Status:  Full code status  Chief Complaint: Dizziness ((+) COVID x1 wk ago, pt states cleared to return to work today, after she got home, she experienced a dizzy spell, denies LOC. Pt took bp/hr/o2 @ home during dizzy spell (134/95, HR-118, O2-96%), pt appears very anxious in triage, takes PRN Klonipin for anxiety, did not take today. Tolerating PO intake, denies decreased appetite, fever, diarrhea, n/v)        Patient information was obtained from patient, patient's family, past medical records and ER records.     HISTORY OF PRESENT ILLNESS:   Mary Fernandez is a 37 y.o. female who past medical history includes ADD, anxiety, depression, benign brain tumor in, endometriosis, GERD, interstitial cystitis, seizures, lupus erythematosus, fibromyalgia, chronic back pain, chronic pain syndrome; presented to the ED at [facility] on 1/6/2023 with a primary complaint of dizziness with near-syncope event at home with associated abdominal pain in urinary frequency..  Patient denies any loss of consciousness, denies any injury, denies any trauma.  Patient reports she was diagnosed with COVID-19 positive about a week ago.  She reports she has been taking medications for her cough congestion symptoms.  No reports of chest pain, fever shortness a breath, or any injury or trauma.  Lab work unremarkable outside SARs CoV 2 PCR detected.  Chest x-ray done demonstrated no acute cardiopulmonary process.  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  Initial vital signs /110 pulse  96 respirations 19 temperature 98.1° F O2 saturation 99% on room air.  Patient received IV hydration in 1 mg clonazepam and 25 mg of diphenhydramine IV push in the ED as she was reported very anxious and restless in the ED. patient is much calmer and relaxed at the time of examination and rounds.  Patient denies any overt respiratory symptoms; she reports intermittent nonproductive cough.  Patient is admitted to hospital medicine services for further management.    PAST MEDICAL HISTORY:     Past Medical History:   Diagnosis Date    ADHD (attention deficit hyperactivity disorder)     Allergy     Anxiety     Atopic dermatitis 2018    Brain tumor 2008    Endometriosis     GERD (gastroesophageal reflux disease)     IC (interstitial cystitis)     Obsessive-compulsive disorder     Seizures        PAST SURGICAL HISTORY:     Past Surgical History:   Procedure Laterality Date    AUGMENTATION OF BREAST      CERVICAL FUSION      HYSTERECTOMY      LAPAROSCOPIC DRAINAGE OF CYST OF OVARY      NASAL SEPTOPLASTY  2007       ALLERGIES:   Ibuprofen, Metoclopramide, Carisoprodol, Gabapentin, Clindamycin, Cyclobenzaprine, Morphine, Penicillin v potassium, and Rizatriptan    FAMILY HISTORY:   Reviewed and negative    SOCIAL HISTORY:   Denies any recent tobacco use   Denies any illicit drug use   Drinks alcohol socially  Lives with family    HOME MEDICATIONS:     Prior to Admission medications    Medication Sig Start Date End Date Taking? Authorizing Provider   albuterol (PROVENTIL/VENTOLIN HFA) 90 mcg/actuation inhaler Take 2 puffs by mouth every 6 (six) hours as needed. 4/2/19  Yes Historical Provider   clonazePAM (KLONOPIN) 1 MG tablet Take 1 tablet (1 mg total) by mouth 3 (three) times daily as needed for Anxiety. 11/18/22  Yes Enoch Carreon MD   EScitalopram oxalate (LEXAPRO) 20 MG tablet Take 1 tablet (20 mg total) by mouth once daily. 1/5/23  Yes Enoch Carreon MD   HYDROcodone-acetaminophen (NORCO)  mg per tablet  Take 1 tablet by mouth 3 (three) times daily as needed for Pain. 1/5/23 2/4/23 Yes Enoch Carreon MD   hydrOXYchloroQUINE (PLAQUENIL) 200 mg tablet Take 1 tablet (200 mg total) by mouth 2 (two) times daily. 1/5/23  Yes Enoch Carreon MD   levocetirizine (XYZAL) 5 MG tablet Take 1 tablet (5 mg total) by mouth every evening. 1/5/23 1/5/24 Yes Enoch Carreon MD   mycophenolate (CELLCEPT) 500 mg Tab Take 2 tablets (1,000 mg total) by mouth 2 (two) times daily. 1/5/23 1/5/24 Yes Enoch Carreon MD   omeprazole (PRILOSEC) 40 MG capsule Take 1 capsule (40 mg total) by mouth every morning. 1/5/23 1/5/24 Yes Enoch Carreon MD   predniSONE (DELTASONE) 5 MG tablet Take 1 tablet (5 mg total) by mouth daily as needed (flare ups). AFTER BREAKFAST 1/5/23  Yes Enoch Carreon MD   pregabalin (LYRICA) 25 MG capsule Take 1 capsule (25 mg total) by mouth 2 (two) times daily. 1/5/23 7/6/23 Yes Enoch Carreon MD   tiZANidine (ZANAFLEX) 2 MG tablet Take 1 tablet (2 mg total) by mouth nightly. 1/5/23  Yes Enoch Carreon MD   buPROPion (WELLBUTRIN XL) 300 MG 24 hr tablet Take 300 mg by mouth every morning. 6/6/22   Historical Provider   dextroamphetamine-amphetamine 30 mg Tab Take 1 tablet by mouth 2 (two) times daily. 6/15/22   Historical Provider   dupilumab (DUPIXENT) 300 mg/2 mL Syrg Inject 300 mg into the skin. Every other week 2/18/19   Historical Provider   fluticasone propionate (FLONASE) 50 mcg/actuation nasal spray 1 spray by Each Nare route once daily.    Historical Provider   ondansetron (ZOFRAN) 8 MG tablet TK 1 T PO TID PRN 3/23/20   Historical Provider       REVIEW OF SYSTEMS:   Except as documented, all other systems reviewed and negative     PHYSICAL EXAM:     VITAL SIGNS: 24 HRS MIN & MAX LAST   Temp  Min: 98.1 °F (36.7 °C)  Max: 98.1 °F (36.7 °C) 98.1 °F (36.7 °C)   BP  Min: 120/87  Max: 152/110 122/68   Pulse  Min: 75  Max: 160  88   Resp  Min: 13  Max: 19 16   SpO2  Min: 99 %  Max: 100 %  99 %       General appearance:  Well-developed well-nourished anxious female, nontoxic; NAD.  HENT: Atraumatic head. Moist mucous membranes of oral cavity.  Eyes: NIDIA  Neck: Supple.   Lungs: Clear to auscultation bilaterally. No wheezing present.   Heart: Regular rate and rhythm. S1 and S2 present with no murmurs/gallop/rub. Trace edema to BLE  Abdomen: Soft, non-distended, non-tender. No rebound tenderness/guarding. Bowel sounds are normal.   Extremities: BARNETT, generalize weakness  Skin: warm ad dry  Neuro: aax3, no focal deficits  Psych/mental status: Appropriate mood and affect. Responds appropriately to questions.     LABS AND IMAGING:     Recent Labs   Lab 01/05/23  0900 01/06/23  0115   WBC 7.4 8.0   RBC 4.59 4.35   HGB 13.8 13.1   HCT 40.8 37.3   MCV 88.9 85.7   MCH 30.1 30.1   MCHC 33.8 35.1   RDW 12.1 11.9    261   MPV 10.8 10.5       Recent Labs   Lab 01/05/23  0900 01/06/23  0115    140   K 4.3 4.5   CO2 31* 29   BUN 16.2 21.0*   CREATININE 0.74 0.84   CALCIUM 9.6 9.7   MG  --  1.90   ALBUMIN 4.1 4.2   ALKPHOS 63 68   ALT 15 16   AST 12 15   BILITOT 0.3 0.2       Microbiology Results (last 7 days)       ** No results found for the last 168 hours. **             CT Head Without Contrast  Narrative: EXAMINATION:  CT HEAD WITHOUT CONTRAST    CLINICAL HISTORY:  dizziness, history brain mass;    TECHNIQUE:  Low dose axial images were obtained through the head.  Coronal and sagittal reformations were also performed. Contrast was not administered.    Automatic exposure control was utilized to reduce the patient's radiation dose.    DLP= 1142    COMPARISON:  06/09/2019    FINDINGS:  No acute intracranial hemorrhage.  Hyperdense midline mass is unchanged.    There is no hydrocephalus, evidence of herniation or midline shift. The ventricles and sulci are normal.    There is normal gray white differentiation.    The osseous structures are normal.    The mastoid air cells are clear.    The auditory  canals are patent bilaterally.    The globes and orbital contents are normal bilaterally.    The visualized maxillary, ethmoid and sphenoid sinuses are clear.  Impression: No acute intracranial abnormality identified.Hyperdense midline mass is unchanged.    Electronically signed by: Justin Burton  Date:    01/06/2023  Time:    06:45  X-Ray Chest 1 View  Narrative: EXAMINATION:  XR CHEST 1 VIEW    CLINICAL HISTORY:  Dizziness and giddiness    TECHNIQUE:  Single view of the chest    COMPARISON:  09/13/2022    FINDINGS:  No focal opacification, pleural effusion, or pneumothorax.    The cardiomediastinal silhouette is within normal limits.    No acute osseous abnormality.  Impression: No acute cardiopulmonary process.    Electronically signed by: Justin Burton  Date:    01/06/2023  Time:    06:27        ASSESSMENT & PLAN:   ASSESSMENT:  Acute syncope/near-syncope   COVID-19 positive viral infection  Dizziness   Tachycardia-resolving   SLE   Chronic back pain   Weakness    PLAN:  Isolation precautions per COVID-19 protocol   IV hydration   PRN pain management   Echocardiogram 2D if not done recently   Carotid ultrasound   Resume home medication as deemed necessary   Fall precautions   Symptomatic treatment of COVID 19 symptoms  Neurochecks q.4 hours    VTE Prophylaxis: lovenox SCD for DVT prophylaxis and will be advised to be as mobile as possible and sit in a chair as tolerated    Patient condition:  Stable/    __________________________________________________________________________  INPATIENT LIST OF MEDICATIONS     Scheduled Meds:   diphenhydrAMINE  25 mg Intravenous ED 1 Time    enoxaparin  40 mg Subcutaneous Daily     Continuous Infusions:  PRN Meds:.acetaminophen, aluminum-magnesium hydroxide-simethicone, melatonin, naloxone, ondansetron, polyethylene glycol, prochlorperazine, simethicone      IDanika FNP have reviewed and discussed the case with Dr. Franklin Vazquez. Please see the following  addendum for further assessment and plan from there attending MD.  Danika Monique, [facility]   01/06/2023    ________________________________________________________________________________    MD Addendum:  IDr. ---assumed care of this patient today at ---am/pm  For the patient encounter, I performed the substantive portion of the visit, I reviewed the NP/PA documentation, treatment plan, and medical decision making.  I had face to face time with this patient     A. History:    B. Physical exam:    C. Medical decision making:    Discharge Planning and Disposition: No mobility needs. Ambulating well. Good social support system.   Anticipated discharge    All diagnosis and differential diagnosis have been reviewed; assessment and plan has been documented; I have personally reviewed the labs and test results that are presently available; I have reviewed the patients medication list; I have reviewed the consulting providers response and recommendations. I have reviewed or attempted to review medical records based upon their availability.    All of the patient and family questions have been addressed and answered. Patient's is agreeable to the above stated plan. I will continue to monitor closely and make adjustments to medical management as needed.

## 2023-01-07 LAB
ALBUMIN SERPL-MCNC: 3.6 G/DL (ref 3.5–5)
ALBUMIN/GLOB SERPL: 1.1 RATIO (ref 1.1–2)
ALP SERPL-CCNC: 55 UNIT/L (ref 40–150)
ALT SERPL-CCNC: 15 UNIT/L (ref 0–55)
AST SERPL-CCNC: 15 UNIT/L (ref 5–34)
BASOPHILS # BLD AUTO: 0.02 X10(3)/MCL (ref 0–0.2)
BASOPHILS NFR BLD AUTO: 0.3 %
BILIRUBIN DIRECT+TOT PNL SERPL-MCNC: 0.4 MG/DL
BUN SERPL-MCNC: 15.9 MG/DL (ref 7–18.7)
CALCIUM SERPL-MCNC: 9.1 MG/DL (ref 8.4–10.2)
CHLORIDE SERPL-SCNC: 107 MMOL/L (ref 98–107)
CO2 SERPL-SCNC: 24 MMOL/L (ref 22–29)
CREAT SERPL-MCNC: 0.7 MG/DL (ref 0.55–1.02)
EOSINOPHIL # BLD AUTO: 0.14 X10(3)/MCL (ref 0–0.9)
EOSINOPHIL NFR BLD AUTO: 2.1 %
ERYTHROCYTE [DISTWIDTH] IN BLOOD BY AUTOMATED COUNT: 12.3 % (ref 11–14.5)
GFR SERPLBLD CREATININE-BSD FMLA CKD-EPI: >90 MLS/MIN/1.73/M2
GLOBULIN SER-MCNC: 3.2 GM/DL (ref 2.4–3.5)
GLUCOSE SERPL-MCNC: 92 MG/DL (ref 74–100)
HCT VFR BLD AUTO: 37.2 % (ref 37–47)
HGB BLD-MCNC: 12.8 GM/DL (ref 12–16)
IMM GRANULOCYTES # BLD AUTO: 0.01 X10(3)/MCL (ref 0–0.04)
IMM GRANULOCYTES NFR BLD AUTO: 0.2 %
LYMPHOCYTES # BLD AUTO: 2.25 X10(3)/MCL (ref 0.6–4.6)
LYMPHOCYTES NFR BLD AUTO: 34.1 %
MAGNESIUM SERPL-MCNC: 2.1 MG/DL (ref 1.6–2.6)
MCH RBC QN AUTO: 29.8 PG
MCHC RBC AUTO-ENTMCNC: 34.4 MG/DL (ref 33–36)
MCV RBC AUTO: 86.7 FL (ref 80–94)
MONOCYTES # BLD AUTO: 0.61 X10(3)/MCL (ref 0.1–1.3)
MONOCYTES NFR BLD AUTO: 9.2 %
NEUTROPHILS # BLD AUTO: 3.57 X10(3)/MCL (ref 2.1–9.2)
NEUTROPHILS NFR BLD AUTO: 54.1 %
NRBC BLD AUTO-RTO: 0 % (ref 0–1)
PHOSPHATE SERPL-MCNC: 3.2 MG/DL (ref 2.3–4.7)
PLATELET # BLD AUTO: 263 X10(3)/MCL (ref 140–371)
PMV BLD AUTO: 10.7 FL (ref 9.4–12.4)
POTASSIUM SERPL-SCNC: 3.7 MMOL/L (ref 3.5–5.1)
PROT SERPL-MCNC: 6.8 GM/DL (ref 6.4–8.3)
RBC # BLD AUTO: 4.29 X10(6)/MCL (ref 4.2–5.4)
SODIUM SERPL-SCNC: 139 MMOL/L (ref 136–145)
WBC # SPEC AUTO: 6.6 X10(3)/MCL (ref 4.5–11.5)

## 2023-01-07 PROCEDURE — 25000003 PHARM REV CODE 250: Performed by: NURSE PRACTITIONER

## 2023-01-07 PROCEDURE — 25000242 PHARM REV CODE 250 ALT 637 W/ HCPCS: Performed by: INTERNAL MEDICINE

## 2023-01-07 PROCEDURE — 25000003 PHARM REV CODE 250: Performed by: INTERNAL MEDICINE

## 2023-01-07 PROCEDURE — G0378 HOSPITAL OBSERVATION PER HR: HCPCS

## 2023-01-07 PROCEDURE — 85025 COMPLETE CBC W/AUTO DIFF WBC: CPT | Performed by: NURSE PRACTITIONER

## 2023-01-07 PROCEDURE — 80053 COMPREHEN METABOLIC PANEL: CPT | Performed by: NURSE PRACTITIONER

## 2023-01-07 PROCEDURE — 63600175 PHARM REV CODE 636 W HCPCS: Performed by: NURSE PRACTITIONER

## 2023-01-07 PROCEDURE — 83735 ASSAY OF MAGNESIUM: CPT | Performed by: NURSE PRACTITIONER

## 2023-01-07 PROCEDURE — 96372 THER/PROPH/DIAG INJ SC/IM: CPT | Performed by: NURSE PRACTITIONER

## 2023-01-07 PROCEDURE — 84100 ASSAY OF PHOSPHORUS: CPT | Performed by: NURSE PRACTITIONER

## 2023-01-07 PROCEDURE — 36415 COLL VENOUS BLD VENIPUNCTURE: CPT | Performed by: NURSE PRACTITIONER

## 2023-01-07 RX ORDER — BUPROPION HYDROCHLORIDE 150 MG/1
150 TABLET ORAL EVERY MORNING
Status: DISCONTINUED | OUTPATIENT
Start: 2023-01-07 | End: 2023-01-08 | Stop reason: HOSPADM

## 2023-01-07 RX ADMIN — HYDROCODONE BITARTRATE AND ACETAMINOPHEN 1 TABLET: 10; 325 TABLET ORAL at 05:01

## 2023-01-07 RX ADMIN — HYDROXYCHLOROQUINE SULFATE 200 MG: 200 TABLET ORAL at 09:01

## 2023-01-07 RX ADMIN — ESCITALOPRAM OXALATE 20 MG: 10 TABLET ORAL at 09:01

## 2023-01-07 RX ADMIN — CLONAZEPAM 1 MG: 1 TABLET ORAL at 02:01

## 2023-01-07 RX ADMIN — CLONAZEPAM 1 MG: 1 TABLET ORAL at 09:01

## 2023-01-07 RX ADMIN — TIZANIDINE 2 MG: 2 TABLET ORAL at 09:01

## 2023-01-07 RX ADMIN — ENOXAPARIN SODIUM 40 MG: 40 INJECTION SUBCUTANEOUS at 05:01

## 2023-01-07 RX ADMIN — PANTOPRAZOLE SODIUM 40 MG: 40 TABLET, DELAYED RELEASE ORAL at 09:01

## 2023-01-07 RX ADMIN — FLUTICASONE PROPIONATE 50 MCG: 50 SPRAY, METERED NASAL at 09:01

## 2023-01-07 RX ADMIN — BUPROPION HYDROCHLORIDE 150 MG: 150 TABLET, FILM COATED, EXTENDED RELEASE ORAL at 02:01

## 2023-01-07 NOTE — PROGRESS NOTES
[facility] Hospital Medicine Progress Note        Chief Complaint: Inpatient Follow-up for Panic Attack    HPI:   Mrs Fernandez is a 37 y.o. lady with PMH of ADD, Anxiety, Depression, Benign Brain Tumor, Endometriosis, GERD, Interstitial Cystitis, Seizures, SLE, Fibromyalgia, Chronic Back Pain, Chronic Pain Syndrome; presented to the ED at [facility] on 1/6/2023 with a primary complaint of dizziness with near-syncope event at home with associated abdominal pain in urinary frequency. Patient denied any loss of consciousness, injury or any trauma.  Patient reported she was diagnosed with COVID-19 positive about a week ago.  She reported she has been taking medications for her cough congestion symptoms. Lab work unremarkable outside SARs CoV 2 PCR +.  Chest x-ray demonstrated no acute cardiopulmonary process.  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 Initial vital signs /110 pulse 96 respirations 19 temperature 98.1° F O2 saturation 99% on room air.  Patient received IV hydration, 1 mg clonazepam and 25 mg of diphenhydramine IV push in the ED as she was reported very anxious and restless in the ED. Patient denied any overt respiratory symptoms; she reports intermittent nonproductive cough.  Patient admitted to hospital medicine services for further management.    Interval Hx:    Today, Mrs. Fernandez stated that she was feeling a little bit better.  Reports episodic occurrence of shortness of breath, tingling and numbness in hands and feet, impending sense of doom, fear of dying, tachycardia, abdominal pain 3-4 times in the last 1 week.  She kept mentioning that she wanted a loop recorder and was counseled regarding the possibility that her symptoms were due to panic attacks.  Consulting Psychiatry for further evaluation.  Echocardiogram done yesterday showed EF 60%, normal diastolic function, no significant valvular abnormalities.  B/L U/S  carotids showed no significant stenosis.  She did have an episode of sinus tachycardia in the ER at nighttime which resolved by itself.    Objective/physical exam:  General: In no acute distress, afebrile  Chest: Clear to auscultation bilaterally  Heart: RRR, +S1, S2, no appreciable murmur  Abdomen: Soft, nontender, BS +  MSK: Warm, no lower extremity edema, no clubbing or cyanosis  Neurologic: Alert and oriented x4, Cranial nerve II-XII intact, Strength 5/5 in all 4 extremities    VITAL SIGNS: 24 HRS MIN & MAX LAST   Temp  Min: 97.7 °F (36.5 °C)  Max: 98.3 °F (36.8 °C) 98 °F (36.7 °C)   BP  Min: 98/63  Max: 113/70 111/75   Pulse  Min: 69  Max: 87  75   Resp  Min: 15  Max: 18 16   SpO2  Min: 96 %  Max: 100 % 97 %       Recent Labs   Lab 01/05/23  0900 01/06/23  0115 01/07/23  0438   WBC 7.4 8.0 6.6   RBC 4.59 4.35 4.29   HGB 13.8 13.1 12.8   HCT 40.8 37.3 37.2   MCV 88.9 85.7 86.7   MCH 30.1 30.1 29.8   MCHC 33.8 35.1 34.4   RDW 12.1 11.9 12.3    261 263   MPV 10.8 10.5 10.7       Recent Labs   Lab 01/05/23  0900 01/06/23  0115 01/07/23  0438    140 139   K 4.3 4.5 3.7   CO2 31* 29 24   BUN 16.2 21.0* 15.9   CREATININE 0.74 0.84 0.70   CALCIUM 9.6 9.7 9.1   MG  --  1.90 2.10   ALBUMIN 4.1 4.2 3.6   ALKPHOS 63 68 55   ALT 15 16 15   AST 12 15 15   BILITOT 0.3 0.2 0.4          Microbiology Results (last 7 days)       ** No results found for the last 168 hours. **             See below for Radiology    Scheduled Med:   buPROPion  150 mg Oral QAM    clonazePAM  1 mg Oral TID    enoxaparin  40 mg Subcutaneous Daily    EScitalopram oxalate  20 mg Oral Daily    fluticasone propionate  1 spray Each Nostril Daily    hydrOXYchloroQUINE  200 mg Oral BID    mycophenolate  1,000 mg Oral BID    pantoprazole  40 mg Oral Daily    pregabalin  25 mg Oral BID    tiZANidine  2 mg Oral Nightly        Continuous Infusions:       PRN Meds:  acetaminophen, albuterol, aluminum-magnesium hydroxide-simethicone,  HYDROcodone-acetaminophen, melatonin, naloxone, ondansetron, polyethylene glycol, prochlorperazine, simethicone       Assessment/Plan:  Acute Near-Syncope 2/2 possible Panic Attack  Episodic Tachycardia, SOB, Numbness B/L UEs/LEs, Sense of Doom, Fear of Dying 2/2 Panic Attacks  COVID +  ADD  Anxiety  Depression  Benign Brain Tumor  Endometriosis  GERD  Interstitial Cystitis  Seizures  SLE  Fibromyalgia  Chronic Back Pain    - Patient admitted for close monitoring  - Currently has stable respiratory status with adequate saturations on RA   - Consulting Psychiatry for evaluation of panic attacks min up to pain initiation of medication regimen   - Patient continued on bupropion 150 mg, clonazepam 1 mg t.i.d., escitalopram 20 mg daily, tizanidine 2 mg   - Patient continued on mycophenolate 1 g b.i.d., hydroxychloroquine 200 mg b.i.d.  - Will continue monitoring patient's symptoms    VTE prophylaxis: Lovenox    Patient condition:  Stable    Anticipated discharge and Disposition:     DC tomorrow after psych recs    All diagnosis and differential diagnosis have been reviewed; assessment and plan has been documented; I have personally reviewed the labs and test results that are presently available; I have reviewed the patients medication list; I have reviewed the consulting providers response and recommendations. I have reviewed or attempted to review medical records based upon their availability    All of the patient's questions have been  addressed and answered. Patient's is agreeable to the above stated plan. I will continue to monitor closely and make adjustments to medical management as needed.  _____________________________________________________________________    Nutrition Status: Regular    Radiology:  Echo  · The left ventricle is normal in size with normal systolic function.  · The estimated ejection fraction is 60%.  · Normal left ventricular diastolic function.  · Normal right ventricular size with normal  right ventricular systolic   function.  · No significant valvular abnormalities noted in this study.     CT Head Without Contrast  Narrative: EXAMINATION:  CT HEAD WITHOUT CONTRAST    CLINICAL HISTORY:  dizziness, history brain mass;    TECHNIQUE:  Low dose axial images were obtained through the head.  Coronal and sagittal reformations were also performed. Contrast was not administered.    Automatic exposure control was utilized to reduce the patient's radiation dose.    DLP= 1142    COMPARISON:  06/09/2019    FINDINGS:  No acute intracranial hemorrhage.  Hyperdense midline mass is unchanged.    There is no hydrocephalus, evidence of herniation or midline shift. The ventricles and sulci are normal.    There is normal gray white differentiation.    The osseous structures are normal.    The mastoid air cells are clear.    The auditory canals are patent bilaterally.    The globes and orbital contents are normal bilaterally.    The visualized maxillary, ethmoid and sphenoid sinuses are clear.  Impression: No acute intracranial abnormality identified.Hyperdense midline mass is unchanged.    Electronically signed by: Justin Burton  Date:    01/06/2023  Time:    06:45  X-Ray Chest 1 View  Narrative: EXAMINATION:  XR CHEST 1 VIEW    CLINICAL HISTORY:  Dizziness and giddiness    TECHNIQUE:  Single view of the chest    COMPARISON:  09/13/2022    FINDINGS:  No focal opacification, pleural effusion, or pneumothorax.    The cardiomediastinal silhouette is within normal limits.    No acute osseous abnormality.  Impression: No acute cardiopulmonary process.    Electronically signed by: Justin Burton  Date:    01/06/2023  Time:    06:27      Mahesh Coleman MD   01/07/2023
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
## 2023-01-08 VITALS
DIASTOLIC BLOOD PRESSURE: 71 MMHG | BODY MASS INDEX: 31.18 KG/M2 | OXYGEN SATURATION: 98 % | RESPIRATION RATE: 16 BRPM | TEMPERATURE: 98 F | HEART RATE: 87 BPM | WEIGHT: 194 LBS | SYSTOLIC BLOOD PRESSURE: 108 MMHG | HEIGHT: 66 IN

## 2023-01-08 PROBLEM — F41.0 PANIC ATTACK: Status: ACTIVE | Noted: 2023-01-08

## 2023-01-08 LAB
LEFT CCA DIST SYS: 69 CM/S
LEFT CCA PROX DIAS: 19 CM/S
LEFT CCA PROX SYS: 87 CM/S
LEFT ECA SYS: 98 CM/S
LEFT ICA DIST DIAS: 22 CM/S
LEFT ICA DIST SYS: 56 CM/S
LEFT ICA MID DIAS: 15 CM/S
LEFT ICA MID SYS: 54 CM/S
LEFT ICA PROX DIAS: 22 CM/S
LEFT ICA PROX SYS: 67 CM/S
LEFT VERTEBRAL DIAS: 7 CM/S
LEFT VERTEBRAL SYS: 30 CM/S
OHS CV CAROTID RIGHT ICA EDV HIGHEST: 40
OHS CV CAROTID ULTRASOUND LEFT ICA/CCA RATIO: 0.97
OHS CV CAROTID ULTRASOUND RIGHT ICA/CCA RATIO: 1.13
OHS CV PV CAROTID LEFT HIGHEST CCA: 87
OHS CV PV CAROTID LEFT HIGHEST ICA: 67
OHS CV PV CAROTID RIGHT HIGHEST CCA: 83
OHS CV PV CAROTID RIGHT HIGHEST ICA: 94
OHS CV US CAROTID LEFT HIGHEST EDV: 22
RIGHT CCA DIST DIAS: 23 CM/S
RIGHT CCA DIST SYS: 83 CM/S
RIGHT CCA PROX DIAS: 18 CM/S
RIGHT CCA PROX SYS: 77 CM/S
RIGHT ECA SYS: 146 CM/S
RIGHT ICA DIST DIAS: 40 CM/S
RIGHT ICA DIST SYS: 62 CM/S
RIGHT ICA MID DIAS: 31 CM/S
RIGHT ICA MID SYS: 94 CM/S
RIGHT ICA PROX DIAS: 13 CM/S
RIGHT ICA PROX SYS: 58 CM/S
RIGHT VERTEBRAL SYS: 42 CM/S

## 2023-01-08 PROCEDURE — 25000003 PHARM REV CODE 250: Performed by: INTERNAL MEDICINE

## 2023-01-08 PROCEDURE — 25000003 PHARM REV CODE 250: Performed by: NURSE PRACTITIONER

## 2023-01-08 PROCEDURE — G0378 HOSPITAL OBSERVATION PER HR: HCPCS

## 2023-01-08 RX ORDER — BUPROPION HYDROCHLORIDE 150 MG/1
150 TABLET ORAL EVERY MORNING
Qty: 30 TABLET | Refills: 11 | Status: SHIPPED | OUTPATIENT
Start: 2023-01-09 | End: 2023-03-16

## 2023-01-08 RX ORDER — DIPHENHYDRAMINE HCL 25 MG
25 CAPSULE ORAL EVERY 6 HOURS PRN
Status: DISCONTINUED | OUTPATIENT
Start: 2023-01-08 | End: 2023-01-08 | Stop reason: HOSPADM

## 2023-01-08 RX ADMIN — BUPROPION HYDROCHLORIDE 150 MG: 150 TABLET, FILM COATED, EXTENDED RELEASE ORAL at 06:01

## 2023-01-08 RX ADMIN — PANTOPRAZOLE SODIUM 40 MG: 40 TABLET, DELAYED RELEASE ORAL at 09:01

## 2023-01-08 RX ADMIN — CLONAZEPAM 1 MG: 1 TABLET ORAL at 09:01

## 2023-01-08 RX ADMIN — DIPHENHYDRAMINE HYDROCHLORIDE 25 MG: 25 CAPSULE ORAL at 01:01

## 2023-01-08 RX ADMIN — ESCITALOPRAM OXALATE 20 MG: 10 TABLET ORAL at 09:01

## 2023-01-08 RX ADMIN — FLUTICASONE PROPIONATE 50 MCG: 50 SPRAY, METERED NASAL at 09:01

## 2023-01-08 RX ADMIN — HYDROXYCHLOROQUINE SULFATE 200 MG: 200 TABLET ORAL at 09:01

## 2023-01-08 NOTE — NURSING
Pt DC per MD order. PIV removed and covered with gauze and tape. Dc instructions reviewed with pt who verbalized understanding. Pt sent home with mask. Covid instructions reviewed. Pt was ambulatory downstairs to private vehicle. Pt in stable condition.

## 2023-01-08 NOTE — PROGRESS NOTES
"1/8/2023 8:41 AM   Mary Fernandez   1985   [id number]        Psychiatry Progress Note     SUBJECTIVE:   Mary Fernandez is a 37 y.o. female presented to the ED yesterday with complaints of dizziness. She was COVID (+) x1 week ago and cleared to return to work yesterday. After she got home, she experienced a dizzy spell with a near syncopal episode at home in addition to associated abdominal pain and an increase in urinary frequency. Her past medical history includes ADD, anxiety, depression, benign brain tumor in, endometriosis, GERD, interstitial cystitis, seizures, lupus erythematosus, fibromyalgia, chronic back pain, chronic pain syndrome. Lab work unremarkable outside SARs CoV 2 PCR detected. Chest x-ray done demonstrated no acute cardiopulmonary process. 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 She is prescribed Klonopin PRN, but did not take any yesterday. Patient is under a great deal of psychological stress as she has to care for  at home who is disabled from a stroke along with her own multitude of medical issues. She is forced to work as a  at night as well as care for her family in order to make ends meet.     Today, the patient reports that her mood is "pretty good". She denies any dizziness spells or panic attacks since admission. She was started on Wellbutrin XL 150mg yesterday and denies any adverse effects to the medication thus far. She is amenable to individual psychotherapy sessions on an outpatient basis once discharged; contact information for [facility] given to the patient as they accept her insurance. Cardiac work-up has been negative - ECHO showed an EF 60%, normal diastolic function, no significant valvular abnormalities and B/L U/S carotids showed no significant stenosis. She reports that her sleep is "pretty good" and her appetite is "so-so". She denies current SI and HI. She also " denies any current AH and VH and does not present with delusional thought content. She will likely be discharged home today per attending.        Current Medications:   Scheduled Meds:    buPROPion  150 mg Oral QAM    clonazePAM  1 mg Oral TID    enoxaparin  40 mg Subcutaneous Daily    EScitalopram oxalate  20 mg Oral Daily    fluticasone propionate  1 spray Each Nostril Daily    hydrOXYchloroQUINE  200 mg Oral BID    mycophenolate  1,000 mg Oral BID    pantoprazole  40 mg Oral Daily    pregabalin  25 mg Oral BID    tiZANidine  2 mg Oral Nightly      PRN Meds: acetaminophen, albuterol, aluminum-magnesium hydroxide-simethicone, diphenhydrAMINE, HYDROcodone-acetaminophen, melatonin, naloxone, ondansetron, polyethylene glycol, prochlorperazine, simethicone   Psychotherapeutics (From admission, onward)      Start     Stop Route Frequency Ordered    01/07/23 1312  buPROPion TB24 tablet 150 mg         -- Oral Every morning 01/07/23 1312    01/07/23 0900  EScitalopram oxalate tablet 20 mg         -- Oral Daily 01/06/23 1408            Allergies:   Review of patient's allergies indicates:   Allergen Reactions    Ibuprofen Anaphylaxis    Metoclopramide Anaphylaxis and Other (See Comments)     Other reaction(s): Paralysis of vertical movement      Carisoprodol Other (See Comments)    Gabapentin Other (See Comments)     Other reaction(s): C/O - a headache      Clindamycin Rash    Cyclobenzaprine Palpitations     Other reaction(s): SOB and increased heart rate      Morphine Itching    Penicillin v potassium Rash     Other reaction(s): Rash      Rizatriptan Nausea And Vomiting        OBJECTIVE:   Vitals   Vitals:    01/08/23 0400   BP: 100/62   Pulse: 65   Resp: 18   Temp: 98.1 °F (36.7 °C)        Labs/Imaging/Studies:   Recent Results (from the past 36 hour(s))   Comprehensive Metabolic Panel (CMP)    Collection Time: 01/07/23  4:38 AM   Result Value Ref Range    Sodium Level 139 136 - 145 mmol/L    Potassium Level 3.7 3.5 -  5.1 mmol/L    Chloride 107 98 - 107 mmol/L    Carbon Dioxide 24 22 - 29 mmol/L    Glucose Level 92 74 - 100 mg/dL    Blood Urea Nitrogen 15.9 7.0 - 18.7 mg/dL    Creatinine 0.70 0.55 - 1.02 mg/dL    Calcium Level Total 9.1 8.4 - 10.2 mg/dL    Protein Total 6.8 6.4 - 8.3 gm/dL    Albumin Level 3.6 3.5 - 5.0 g/dL    Globulin 3.2 2.4 - 3.5 gm/dL    Albumin/Globulin Ratio 1.1 1.1 - 2.0 ratio    Bilirubin Total 0.4 <=1.5 mg/dL    Alkaline Phosphatase 55 40 - 150 unit/L    Alanine Aminotransferase 15 0 - 55 unit/L    Aspartate Aminotransferase 15 5 - 34 unit/L    eGFR >90 mls/min/1.73/m2   Magnesium    Collection Time: 01/07/23  4:38 AM   Result Value Ref Range    Magnesium Level 2.10 1.60 - 2.60 mg/dL   Phosphorus    Collection Time: 01/07/23  4:38 AM   Result Value Ref Range    Phosphorus Level 3.2 2.3 - 4.7 mg/dL   CBC with Differential    Collection Time: 01/07/23  4:38 AM   Result Value Ref Range    WBC 6.6 4.5 - 11.5 x10(3)/mcL    RBC 4.29 4.20 - 5.40 x10(6)/mcL    Hgb 12.8 12.0 - 16.0 gm/dL    Hct 37.2 37.0 - 47.0 %    MCV 86.7 80.0 - 94.0 fL    MCH 29.8 pg    MCHC 34.4 33.0 - 36.0 mg/dL    RDW 12.3 11.0 - 14.5 %    Platelet 263 140 - 371 x10(3)/mcL    MPV 10.7 9.4 - 12.4 fL    Neut % 54.1 %    Lymph % 34.1 %    Mono % 9.2 %    Eos % 2.1 %    Basophil % 0.3 %    Lymph # 2.25 0.6 - 4.6 x10(3)/mcL    Neut # 3.57 2.1 - 9.2 x10(3)/mcL    Mono # 0.61 0.1 - 1.3 x10(3)/mcL    Eos # 0.14 0 - 0.9 x10(3)/mcL    Baso # 0.02 0 - 0.2 x10(3)/mcL    IG# 0.01 0 - 0.04 x10(3)/mcL    IG% 0.2 %    NRBC% 0.0 0 - 1 %        Psychiatric Mental Status Exam:  General Appearance: appears stated age, well developed and nourished, adequately groomed and appropriately dressed, in no acute distress  Arousal: alert with clear sensorium  Behavior: normal; cooperative; reasonably friendly, pleasant, and polite; appropriate eye-contact; under good behavioral control  Movements and Motor Activity: no abnormal involuntary movements noted; no tics,  no tremors, no akathisia, no dystonia, no evidence of tardive dyskinesia; no psychomotor agitation or retardation  Orientation: intact; oriented fully to person, place, time and situation  Speech: intact; normal rate, rhythm, volume, tone and pitch; conversational, spontaneous, and coherent  Mood: Near-euthymic  Affect: mood-congruent, constricted  Thought Process: intact, linear, goal-directed, organized, logical  Associations: intact, no loosening of associations  Thought Content and Perceptions: no suicidal or homicidal ideation, no auditory or visual hallucinations, no paranoid ideation, no ideas of reference, no evidence of delusions or psychosis  Recent and Remote Memory: grossly intact, able to recall relevant and salient information from the recent and remote past  Attention and Concentration: grossly intact, attentive to the conversation and not readily distractible  Fund of Knowledge: grossly intact, used appropriate vocabulary and demonstrated an awareness of current events  Insight: adequate  Judgment: adequate    ASSESSMENT/PLAN:   Diagnosis:  Major Depressive Disorder, Recurrent, Unspecified (F33.9)  Generalized Anxiety Disorder (F41.1)    Past Medical History:   Diagnosis Date    ADHD (attention deficit hyperactivity disorder)     Allergy     Anxiety     Atopic dermatitis 2018    Brain tumor 2008    Endometriosis     GERD (gastroesophageal reflux disease)     IC (interstitial cystitis)     Obsessive-compulsive disorder     Seizures         Plan:  Continue psych medications at their current doses   Recommend returning to previous psych provider for the management of psychotropic medications   Motivated for outpatient counseling - contact information for Hilda Valdovinos LPC at [facility] [phone]  Psych will sign off      OSCAR Cunningham  1/8/2023

## 2023-01-09 NOTE — DISCHARGE SUMMARY
[facility] Medicine Discharge Summary    Admit Date: 1/6/2023  Discharge Date and Time: 1/8/20236:14 PM  Admitting Physician:  Team  Discharging Physician: Mahesh Coleman MD.  Primary Care Physician: Robyn Strickland MD  Consults: Psychiatry    Discharge Diagnoses:  Acute Near-Syncope 2/2 possible Panic Attack  Episodic Tachycardia, SOB, Numbness B/L UEs/LEs, Sense of Doom, Fear of Dying 2/2 Panic Attacks  COVID +  ADD  Anxiety  Depression  Benign Brain Tumor  Endometriosis  GERD  Interstitial Cystitis  Seizures  SLE  Fibromyalgia  Chronic Back Pain    Hospital Course:   Mrs Fernandez is a 37 year old lady with PMH of ADD, Anxiety, Depression, Benign Brain Tumor, Endometriosis, GERD, Interstitial Cystitis, Seizures, SLE, Fibromyalgia, Chronic Back Pain, Chronic Pain Syndrome; presented to the ED at [facility] on 1/6/2023 with a primary complaint of dizziness with near-syncope event at home with associated abdominal pain in urinary frequency. Patient denied any loss of consciousness, injury or any trauma.  Patient reported she was diagnosed with COVID-19 positive about a week ago.  She reported she has been taking medications for her cough congestion symptoms. Lab work unremarkable outside SARs CoV 2 PCR +.  Chest x-ray demonstrated no acute cardiopulmonary process.  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 Initial vital signs /110 pulse 96 respirations 19 temperature 98.1° F O2 saturation 99% on room air.  Patient received IV hydration, 1 mg clonazepam and 25 mg of diphenhydramine IV push in the ED as she was reported very anxious and restless in the ED. Patient denied any overt respiratory symptoms; she reports intermittent nonproductive cough. 01/07, reported episodic occurrence of shortness of breath, tingling and numbness in hands and feet, impending sense of doom, fear of dying, tachycardia, abdominal pain 3-4 times in the  last 1 week. She kept mentioning that she wanted a loop recorder and was counseled regarding the possibility that her symptoms were due to panic attacks. Consulting Psychiatry for further evaluation. Echocardiogram showed EF 60%, normal diastolic function, no significant valvular abnormalities.  B/L U/S carotids showed no significant stenosis.  She did have an episode of sinus tachycardia in the ER at nighttime which resolved by itself.     Pt was seen and examined on the day of discharge. She stated that she was doing well and had no new complaints. Feels ready to go home. Psychiatry consulted and saw patient. Restarted Wellbutrin 150 mg daily & recommended returning to follow up with prior Psychiatrist. Patient also to see a therapist in [place]. [place] today.    Vitals:  VITAL SIGNS: 24 HRS MIN & MAX LAST   Temp  Min: 97.7 °F (36.5 °C)  Max: 98.1 °F (36.7 °C) 97.7 °F (36.5 °C)   BP  Min: 100/62  Max: 121/80 108/71   Pulse  Min: 65  Max: 87  87   Resp  Min: 16  Max: 18 16   SpO2  Min: 97 %  Max: 98 % 98 %       Physical Exam:  General: In no acute distress, afebrile  Chest: Clear to auscultation bilaterally  Heart: RRR, +S1, S2, no appreciable murmur  Abdomen: Soft, nontender, BS +  MSK: Warm, no lower extremity edema, no clubbing or cyanosis  Neurologic: Alert and oriented x4, Cranial nerve II-XII intact, Strength 5/5 in all 4 extremities       Procedures Performed: No admission procedures for hospital encounter.     Significant Diagnostic Studies: See Full reports for all details    Recent Labs   Lab 01/05/23 0900 01/06/23 0115 01/07/23  0438   WBC 7.4 8.0 6.6   RBC 4.59 4.35 4.29   HGB 13.8 13.1 12.8   HCT 40.8 37.3 37.2   MCV 88.9 85.7 86.7   MCH 30.1 30.1 29.8   MCHC 33.8 35.1 34.4   RDW 12.1 11.9 12.3    261 263   MPV 10.8 10.5 10.7       Recent Labs   Lab 01/05/23  0900 01/06/23 0115 01/07/23  0438    140 139   K 4.3 4.5 3.7   CO2 31* 29 24   BUN 16.2 21.0* 15.9   CREATININE 0.74 0.84 0.70    CALCIUM 9.6 9.7 9.1   MG  --  1.90 2.10   ALBUMIN 4.1 4.2 3.6   ALKPHOS 63 68 55   ALT 15 16 15   AST 12 15 15   BILITOT 0.3 0.2 0.4        Microbiology Results (last 7 days)       ** No results found for the last 168 hours. **             CV Ultrasound Bilateral Doppler Carotid  The right internal carotid artery demonstrated no hemodynamically   significant stenosis  The left internal carotid artery demonstrated no hemodynamically   significant stenosis.   The bilateral vertebral arteries were patent with antegrade flow.         Medication List        CHANGE how you take these medications      buPROPion 150 MG TB24 tablet  Commonly known as: WELLBUTRIN XL  Take 1 tablet (150 mg total) by mouth every morning.  Start taking on: January 9, 2023  What changed:   medication strength  how much to take            CONTINUE taking these medications      albuterol 90 mcg/actuation inhaler  Commonly known as: PROVENTIL/VENTOLIN HFA     clonazePAM 1 MG tablet  Commonly known as: KlonoPIN  Take 1 tablet (1 mg total) by mouth 3 (three) times daily as needed for Anxiety.     dextroamphetamine-amphetamine 30 mg Tab     dupilumab 300 mg/2 mL Syrg  Commonly known as: DUPIXENT     EScitalopram oxalate 20 MG tablet  Commonly known as: LEXAPRO  Take 1 tablet (20 mg total) by mouth once daily.     fluticasone propionate 50 mcg/actuation nasal spray  Commonly known as: FLONASE     HYDROcodone-acetaminophen  mg per tablet  Commonly known as: NORCO  Take 1 tablet by mouth 3 (three) times daily as needed for Pain.     hydrOXYchloroQUINE 200 mg tablet  Commonly known as: PLAQUENIL  Take 1 tablet (200 mg total) by mouth 2 (two) times daily.     levocetirizine 5 MG tablet  Commonly known as: XYZAL  Take 1 tablet (5 mg total) by mouth every evening.     mycophenolate 500 mg Tab  Commonly known as: CELLCEPT  Take 2 tablets (1,000 mg total) by mouth 2 (two) times daily.     omeprazole 40 MG capsule  Commonly known as: PRILOSEC  Take 1  capsule (40 mg total) by mouth every morning.     ondansetron 8 MG tablet  Commonly known as: ZOFRAN     predniSONE 5 MG tablet  Commonly known as: DELTASONE  Take 1 tablet (5 mg total) by mouth daily as needed (flare ups). AFTER BREAKFAST     pregabalin 25 MG capsule  Commonly known as: LYRICA  Take 1 capsule (25 mg total) by mouth 2 (two) times daily.     tiZANidine 2 MG tablet  Commonly known as: ZANAFLEX  Take 1 tablet (2 mg total) by mouth nightly.               Where to Get Your Medications        You can get these medications from any pharmacy    Bring a paper prescription for each of these medications  buPROPion 150 MG TB24 tablet          Explained in detail to the patient about the discharge plan, medications, and follow-up visits. Pt understands and agrees with the treatment plan  Discharge Disposition: Home or Self Care   Discharged Condition: stable    Medications Per DC med rec  Activities as tolerated    For further questions contact hospitalist office    Discharge time 33 minutes    For worsening symptoms, chest pain, shortness of breath, increased abdominal pain, high grade fever, stroke or stroke like symptoms, immediately go to the nearest Emergency Room or call 911 as soon as possible.      Mahesh Mcihel M.D on 1/8/2023. at 6:14 PM.

## 2023-01-09 NOTE — NURSING
Nurses Note -- 4 Eyes      1/9/2023   10:14 AM      Skin assessed during: Daily Assessment      [x] No Pressure Injuries Present    []Prevention Measures Documented      [] Yes- Altered Skin Integrity Present or Discovered   [] LDA Added if Not in Epic (Describe Wound)   [] New Altered Skin Integrity was Present on Admit and Documented in LDA   [] Wound Image Taken    Wound Care Consulted? No    Attending Nurse:  Shiloh Friedman LPN     Second RN/Staff Member:  Georgiana COOL CNA

## 2023-01-13 ENCOUNTER — HOSPITAL ENCOUNTER (EMERGENCY)
Facility: HOSPITAL | Age: 38
Discharge: HOME | End: 2023-01-13
Attending: EMERGENCY MEDICINE
Payer: MEDICAID

## 2023-01-13 VITALS
HEIGHT: 66 IN | WEIGHT: 190 LBS | SYSTOLIC BLOOD PRESSURE: 142 MMHG | RESPIRATION RATE: 18 BRPM | TEMPERATURE: 98 F | BODY MASS INDEX: 30.53 KG/M2 | OXYGEN SATURATION: 99 % | HEART RATE: 104 BPM | DIASTOLIC BLOOD PRESSURE: 87 MMHG

## 2023-01-13 DIAGNOSIS — K29.00 ACUTE SUPERFICIAL GASTRITIS WITHOUT HEMORRHAGE: ICD-10-CM

## 2023-01-13 DIAGNOSIS — R10.13 EPIGASTRIC ABDOMINAL PAIN: Primary | ICD-10-CM

## 2023-01-13 LAB
ALBUMIN SERPL-MCNC: 3.8 G/DL (ref 3.5–5)
ALBUMIN/GLOB SERPL: 1.2 RATIO (ref 1.1–2)
ALP SERPL-CCNC: 68 UNIT/L (ref 40–150)
ALT SERPL-CCNC: 16 UNIT/L (ref 0–55)
AST SERPL-CCNC: 14 UNIT/L (ref 5–34)
BASOPHILS # BLD AUTO: 0.05 X10(3)/MCL (ref 0–0.2)
BASOPHILS NFR BLD AUTO: 0.5 %
BILIRUBIN DIRECT+TOT PNL SERPL-MCNC: 0.3 MG/DL
BUN SERPL-MCNC: 14.3 MG/DL (ref 7–18.7)
CALCIUM SERPL-MCNC: 9.6 MG/DL (ref 8.4–10.2)
CHLORIDE SERPL-SCNC: 98 MMOL/L (ref 98–107)
CO2 SERPL-SCNC: 28 MMOL/L (ref 22–29)
CREAT SERPL-MCNC: 0.67 MG/DL (ref 0.55–1.02)
EOSINOPHIL # BLD AUTO: 0.2 X10(3)/MCL (ref 0–0.9)
EOSINOPHIL NFR BLD AUTO: 2 %
ERYTHROCYTE [DISTWIDTH] IN BLOOD BY AUTOMATED COUNT: 12 % (ref 11.5–17)
GFR SERPLBLD CREATININE-BSD FMLA CKD-EPI: >60 MLS/MIN/1.73/M2
GLOBULIN SER-MCNC: 3.2 GM/DL (ref 2.4–3.5)
GLUCOSE SERPL-MCNC: 95 MG/DL (ref 74–100)
HCT VFR BLD AUTO: 36.8 % (ref 37–47)
HGB BLD-MCNC: 12.9 GM/DL (ref 12–16)
IMM GRANULOCYTES # BLD AUTO: 0.03 X10(3)/MCL (ref 0–0.04)
IMM GRANULOCYTES NFR BLD AUTO: 0.3 %
LIPASE SERPL-CCNC: 26 U/L
LYMPHOCYTES # BLD AUTO: 2.59 X10(3)/MCL (ref 0.6–4.6)
LYMPHOCYTES NFR BLD AUTO: 25.8 %
MCH RBC QN AUTO: 30.4 PG
MCHC RBC AUTO-ENTMCNC: 35.1 MG/DL (ref 33–36)
MCV RBC AUTO: 86.8 FL (ref 80–94)
MONOCYTES # BLD AUTO: 1.16 X10(3)/MCL (ref 0.1–1.3)
MONOCYTES NFR BLD AUTO: 11.5 %
NEUTROPHILS # BLD AUTO: 6.02 X10(3)/MCL (ref 2.1–9.2)
NEUTROPHILS NFR BLD AUTO: 59.9 %
NRBC BLD AUTO-RTO: 0 %
PLATELET # BLD AUTO: 273 X10(3)/MCL (ref 130–400)
PMV BLD AUTO: 10.8 FL (ref 7.4–10.4)
POTASSIUM SERPL-SCNC: 3.6 MMOL/L (ref 3.5–5.1)
PROT SERPL-MCNC: 7 GM/DL (ref 6.4–8.3)
RBC # BLD AUTO: 4.24 X10(6)/MCL (ref 4.2–5.4)
SODIUM SERPL-SCNC: 138 MMOL/L (ref 136–145)
WBC # SPEC AUTO: 10.1 X10(3)/MCL (ref 4.5–11.5)

## 2023-01-13 PROCEDURE — 80053 COMPREHEN METABOLIC PANEL: CPT | Performed by: EMERGENCY MEDICINE

## 2023-01-13 PROCEDURE — 25000003 PHARM REV CODE 250: Performed by: EMERGENCY MEDICINE

## 2023-01-13 PROCEDURE — 83690 ASSAY OF LIPASE: CPT | Performed by: EMERGENCY MEDICINE

## 2023-01-13 PROCEDURE — 85025 COMPLETE CBC W/AUTO DIFF WBC: CPT | Performed by: EMERGENCY MEDICINE

## 2023-01-13 PROCEDURE — 99284 EMERGENCY DEPT VISIT MOD MDM: CPT

## 2023-01-13 RX ORDER — LIDOCAINE HYDROCHLORIDE 20 MG/ML
15 SOLUTION OROPHARYNGEAL ONCE
Status: COMPLETED | OUTPATIENT
Start: 2023-01-13 | End: 2023-01-13

## 2023-01-13 RX ORDER — MAG HYDROX/ALUMINUM HYD/SIMETH 200-200-20
30 SUSPENSION, ORAL (FINAL DOSE FORM) ORAL ONCE
Status: COMPLETED | OUTPATIENT
Start: 2023-01-13 | End: 2023-01-13

## 2023-01-13 RX ORDER — SUCRALFATE 1 G/1
1 TABLET ORAL
Qty: 56 TABLET | Refills: 0 | Status: SHIPPED | OUTPATIENT
Start: 2023-01-13 | End: 2023-01-27

## 2023-01-13 RX ORDER — PANTOPRAZOLE SODIUM 40 MG/1
40 TABLET, DELAYED RELEASE ORAL DAILY
Qty: 30 TABLET | Refills: 0 | Status: ON HOLD | OUTPATIENT
Start: 2023-01-13 | End: 2023-03-17 | Stop reason: HOSPADM

## 2023-01-13 RX ADMIN — ALUMINUM HYDROXIDE, MAGNESIUM HYDROXIDE, AND SIMETHICONE 30 ML: 200; 200; 20 SUSPENSION ORAL at 06:01

## 2023-01-13 RX ADMIN — LIDOCAINE HYDROCHLORIDE 15 ML: 20 SOLUTION ORAL at 06:01

## 2023-01-13 NOTE — ED PROVIDER NOTES
Encounter Date: 1/13/2023    SCRIBE #1 NOTE: I, Tomás Wong, am scribing for, and in the presence of,  Louisa Landrum MD. I have scribed the following portions of the note - Other sections scribed: HPI, ROS, PE.     History     Chief Complaint   Patient presents with    Back Pain     Recently Dx with COVID. Presents with c/o mid back pain. Denies cough, CP or fever     37 year old female with past medical hstory of GERD, brain tumor, seizures, and anxiety presents to ED c/o abdominal pain onset 1 week ago. Pt reports abdomina pain is located in a band around her abdomen and radiates to her back. Pt denies eating and certain positions worsen pain.Pt states that she had covid like symptoms that onset 2 weeks ago and was diagnosed with covid 1 week ago. Pt reports that she was admitted and that her abdominal pain was not treated. Pt states that she chronic pain that she is prescribed norco for. Pt reports normal BM and urination    The history is provided by the patient. No  was used.   Abdominal Pain  The current episode started several days ago. The onset of the illness was gradual. The abdominal pain is located in the epigastric region. The abdominal pain radiates to the back. The severity of the abdominal pain is 5/10. The abdominal pain is relieved by nothing. The other symptoms of the illness do not include fever, shortness of breath, nausea, vomiting, diarrhea or dysuria.   Symptoms associated with the illness do not include chills, diaphoresis, constipation, hematuria or back pain.   Review of patient's allergies indicates:   Allergen Reactions    Ibuprofen Anaphylaxis    Metoclopramide Anaphylaxis and Other (See Comments)     Other reaction(s): Paralysis of vertical movement      Carisoprodol Other (See Comments)    Gabapentin Other (See Comments)     Other reaction(s): C/O - a headache      Clindamycin Rash    Cyclobenzaprine Palpitations     Other reaction(s): SOB and increased  heart rate      Morphine Itching    Penicillin v potassium Rash     Other reaction(s): Rash      Rizatriptan Nausea And Vomiting     Past Medical History:   Diagnosis Date    ADHD (attention deficit hyperactivity disorder)     Allergy     Anxiety     Atopic dermatitis 2018    Brain tumor 2008    Endometriosis     GERD (gastroesophageal reflux disease)     IC (interstitial cystitis)     Obsessive-compulsive disorder     Seizures      Past Surgical History:   Procedure Laterality Date    AUGMENTATION OF BREAST      CERVICAL FUSION      HYSTERECTOMY      LAPAROSCOPIC DRAINAGE OF CYST OF OVARY      NASAL SEPTOPLASTY  2007     Family History   Problem Relation Age of Onset    Drug abuse Mother     Hypertension Mother     Heart disease Father     Hypertension Father     Alcohol abuse Father     Early death Father      Social History     Tobacco Use    Smoking status: Former    Smokeless tobacco: Never   Substance Use Topics    Alcohol use: Yes    Drug use: Never     Review of Systems   Constitutional:  Negative for chills, diaphoresis and fever.   HENT:  Negative for congestion, ear pain, sinus pain and sore throat.    Eyes:  Negative for pain, discharge and visual disturbance.   Respiratory:  Negative for cough, shortness of breath, wheezing and stridor.    Cardiovascular:  Negative for chest pain and palpitations.   Gastrointestinal:  Positive for abdominal pain. Negative for constipation, diarrhea, nausea, rectal pain and vomiting.   Genitourinary:  Negative for dysuria and hematuria.   Musculoskeletal:  Negative for back pain and myalgias.   Skin:  Negative for rash.   Neurological:  Negative for dizziness, syncope, numbness and headaches.   Hematological: Negative.    Psychiatric/Behavioral: Negative.     All other systems reviewed and are negative.    Physical Exam     Initial Vitals [01/13/23 0215]   BP Pulse Resp Temp SpO2   (!) 142/87 104 18 97.9 °F (36.6 °C) 99 %      MAP       --         Physical  Exam    Nursing note and vitals reviewed.  Constitutional: She appears well-developed and well-nourished. She is not diaphoretic. No distress.   HENT:   Head: Normocephalic and atraumatic.   Nose: Nose normal.   Mouth/Throat: Oropharynx is clear and moist.   Eyes: Conjunctivae and EOM are normal. Pupils are equal, round, and reactive to light.   Neck: Trachea normal. Neck supple.   Normal range of motion.  Cardiovascular:  Regular rhythm, normal heart sounds and intact distal pulses.   Tachycardia present.         No murmur heard.  Slightly tachycardic   Pulmonary/Chest: Breath sounds normal. No respiratory distress. She has no wheezes. She has no rhonchi. She has no rales. She exhibits no tenderness.   Abdominal: Abdomen is soft. Bowel sounds are normal. She exhibits no distension and no mass. Tenderness: mild.   Nontender throughout There is no rebound and no guarding.   Musculoskeletal:         General: No tenderness or edema. Normal range of motion.      Cervical back: Normal range of motion and neck supple.      Lumbar back: Normal. Normal range of motion.     Neurological: She is alert and oriented to person, place, and time. She has normal strength. No cranial nerve deficit or sensory deficit.   Skin: Skin is warm and dry. Capillary refill takes less than 2 seconds. No abscess noted. No erythema. No pallor.   Psychiatric: She has a normal mood and affect. Her behavior is normal. Judgment and thought content normal.       ED Course   Procedures  Labs Reviewed   CBC WITH DIFFERENTIAL - Abnormal; Notable for the following components:       Result Value    Hct 36.8 (*)     MPV 10.8 (*)     All other components within normal limits   LIPASE - Normal   CBC W/ AUTO DIFFERENTIAL    Narrative:     The following orders were created for panel order CBC auto differential.  Procedure                               Abnormality         Status                     ---------                               -----------          ------                     CBC with Differential[[id number]]        Abnormal            Final result                 Please view results for these tests on the individual orders.   COMPREHENSIVE METABOLIC PANEL          Imaging Results    None     Medical Decision Making  Problems Addressed:  Acute superficial gastritis without hemorrhage: acute illness or injury  Epigastric abdominal pain: acute illness or injury    Amount and/or Complexity of Data Reviewed  External Data Reviewed: notes.  Labs: ordered.    Risk  OTC drugs.  Prescription drug management.           Medications   aluminum-magnesium hydroxide-simethicone 200-200-20 mg/5 mL suspension 30 mL (30 mLs Oral Given 1/13/23 0612)     And   LIDOcaine HCl 2% oral solution 15 mL (15 mLs Oral Given 1/13/23 0611)     Medical Decision Making:   History:   Old Medical Records: I decided to obtain old medical records.  Old Records Summarized: records from previous admission(s).       <> Summary of Records: Numerous ED visits for chest pain.  Recent admission for tachycardia she endorsed  Initial Assessment:   Epigastric pain radiating to the back ongoing for at least several days  Differential Diagnosis:   Pancreatitis, renal dysfunction, electrolyte derangement, anemia, dyspepsia  Clinical Tests:   Lab Tests: Reviewed and Ordered  The following lab test(s) were unremarkable: CBC, CMP and Lipase  ED Management:  CBC, CMP and lipase ordered and reviewed without significant abnormality.  Given GI cocktail with significant improvement.  She is benign abdominal exam and has been hemodynamically stable.  She is comfortable with starting PPI and Carafate with dietary changes.  Discussed avoiding NSAIDs and importance of follow-up with PCP and she voiced understanding and agrees is comfortable with plan        Scribe Attestation:   Scribe #1: I performed the above scribed service and the documentation accurately describes the services I performed. I attest to the accuracy of  the note.  Comments: Attending:   Physician Attestation Statement for Scribe #1: Louisa THOMAS MD, personally performed the services described in this documentation. All medical record entries made by the scribe were at my direction and in my presence.  I have reviewed the chart and agree that the record reflects my personal performance and is accurate and complete.        Attending Attestation:           Physician Attestation for Scribe:  Physician Attestation Statement for Scribe #1: Louisa THOMAS MD, reviewed documentation, as scribed by Tomás Wong in my presence, and it is both accurate and complete.           ED Course as of 01/13/23 0841   Fri Jan 13, 2023   0639 Pain imrpoved, comfortable with dc [BS]      ED Course User Index  [BS] Louisa Landrum MD                 Clinical Impression:   Final diagnoses:  [R10.13] Epigastric abdominal pain (Primary)  [K29.00] Acute superficial gastritis without hemorrhage        ED Disposition Condition    Discharge Stable          ED Prescriptions       Medication Sig Dispense Start Date End Date Auth. Provider    pantoprazole (PROTONIX) 40 MG tablet Take 1 tablet (40 mg total) by mouth once daily. 30 tablet 1/13/2023 2/12/2023 Louisa Landrum MD    sucralfate (CARAFATE) 1 gram tablet Take 1 tablet (1 g total) by mouth 4 (four) times daily before meals and nightly. for 14 days 56 tablet 1/13/2023 1/27/2023 Louisa Landrum MD          Follow-up Information       Follow up With Specialties Details Why Contact Info    Robyn Strickland MD Family Medicine Schedule an appointment as soon as possible for a visit   [address] [facility] [address] [phone]      [facility] Dept Emergency Medicine  As needed, If symptoms worsen [address] [place] [id number] [phone]             Louisa Landrum MD  01/13/23 0841       Louisa Landrum MD  01/13/23 0842

## 2023-02-02 DIAGNOSIS — F51.01 PRIMARY INSOMNIA: ICD-10-CM

## 2023-02-02 DIAGNOSIS — D33.2 BENIGN NEOPLASM OF BRAIN, UNSPECIFIED BRAIN REGION: ICD-10-CM

## 2023-02-02 DIAGNOSIS — L93.0 DISCOID LUPUS ERYTHEMATOSUS: ICD-10-CM

## 2023-02-02 DIAGNOSIS — K21.9 GASTROESOPHAGEAL REFLUX DISEASE, UNSPECIFIED WHETHER ESOPHAGITIS PRESENT: ICD-10-CM

## 2023-02-02 DIAGNOSIS — M51.37 DDD (DEGENERATIVE DISC DISEASE), LUMBOSACRAL: ICD-10-CM

## 2023-02-02 DIAGNOSIS — M32.9 SYSTEMIC LUPUS ERYTHEMATOSUS, UNSPECIFIED SLE TYPE, UNSPECIFIED ORGAN INVOLVEMENT STATUS: ICD-10-CM

## 2023-02-02 DIAGNOSIS — M35.9 UNDIFFERENTIATED CONNECTIVE TISSUE DISEASE: ICD-10-CM

## 2023-02-02 DIAGNOSIS — M50.30 DEGENERATIVE CERVICAL DISC: ICD-10-CM

## 2023-02-02 DIAGNOSIS — M79.7 FIBROMYALGIA: ICD-10-CM

## 2023-02-02 RX ORDER — HYDROCODONE BITARTRATE AND ACETAMINOPHEN 10; 325 MG/1; MG/1
1 TABLET ORAL 3 TIMES DAILY PRN
Qty: 90 TABLET | Refills: 0 | Status: SHIPPED | OUTPATIENT
Start: 2023-02-02 | End: 2023-02-27 | Stop reason: SDUPTHER

## 2023-02-27 DIAGNOSIS — M35.9 UNDIFFERENTIATED CONNECTIVE TISSUE DISEASE: ICD-10-CM

## 2023-02-27 DIAGNOSIS — M32.9 SYSTEMIC LUPUS ERYTHEMATOSUS, UNSPECIFIED SLE TYPE, UNSPECIFIED ORGAN INVOLVEMENT STATUS: ICD-10-CM

## 2023-02-27 DIAGNOSIS — M51.37 DDD (DEGENERATIVE DISC DISEASE), LUMBOSACRAL: ICD-10-CM

## 2023-02-27 DIAGNOSIS — F51.01 PRIMARY INSOMNIA: ICD-10-CM

## 2023-02-27 DIAGNOSIS — L93.0 DISCOID LUPUS ERYTHEMATOSUS: ICD-10-CM

## 2023-02-27 DIAGNOSIS — M79.7 FIBROMYALGIA: ICD-10-CM

## 2023-02-27 DIAGNOSIS — D33.2 BENIGN NEOPLASM OF BRAIN, UNSPECIFIED BRAIN REGION: ICD-10-CM

## 2023-02-27 DIAGNOSIS — M50.30 DEGENERATIVE CERVICAL DISC: ICD-10-CM

## 2023-02-27 DIAGNOSIS — K21.9 GASTROESOPHAGEAL REFLUX DISEASE, UNSPECIFIED WHETHER ESOPHAGITIS PRESENT: ICD-10-CM

## 2023-02-27 RX ORDER — HYDROCODONE BITARTRATE AND ACETAMINOPHEN 10; 325 MG/1; MG/1
1 TABLET ORAL 3 TIMES DAILY PRN
Qty: 90 TABLET | Refills: 0 | Status: SHIPPED | OUTPATIENT
Start: 2023-02-27 | End: 2023-03-30 | Stop reason: SDUPTHER

## 2023-03-03 ENCOUNTER — HOSPITAL ENCOUNTER (EMERGENCY)
Facility: HOSPITAL | Age: 38
Discharge: LEFT BEFORE DISCHARGE | End: 2023-03-03
Payer: MEDICAID

## 2023-03-03 VITALS
DIASTOLIC BLOOD PRESSURE: 81 MMHG | HEART RATE: 74 BPM | RESPIRATION RATE: 20 BRPM | TEMPERATURE: 98 F | HEIGHT: 66 IN | SYSTOLIC BLOOD PRESSURE: 149 MMHG | OXYGEN SATURATION: 100 % | WEIGHT: 200 LBS | BODY MASS INDEX: 32.14 KG/M2

## 2023-03-03 DIAGNOSIS — R07.9 CHEST PAIN: ICD-10-CM

## 2023-03-03 LAB
ALBUMIN SERPL-MCNC: 4 G/DL (ref 3.5–5)
ALBUMIN/GLOB SERPL: 1.2 RATIO (ref 1.1–2)
ALP SERPL-CCNC: 60 UNIT/L (ref 40–150)
ALT SERPL-CCNC: 15 UNIT/L (ref 0–55)
APPEARANCE UR: CLEAR
AST SERPL-CCNC: 13 UNIT/L (ref 5–34)
BACTERIA #/AREA URNS AUTO: NORMAL /HPF
BASOPHILS # BLD AUTO: 0.05 X10(3)/MCL (ref 0–0.2)
BASOPHILS NFR BLD AUTO: 0.9 %
BILIRUB UR QL STRIP.AUTO: NEGATIVE MG/DL
BILIRUBIN DIRECT+TOT PNL SERPL-MCNC: 0.3 MG/DL
BUN SERPL-MCNC: 14.7 MG/DL (ref 7–18.7)
CALCIUM SERPL-MCNC: 9.4 MG/DL (ref 8.4–10.2)
CHLORIDE SERPL-SCNC: 103 MMOL/L (ref 98–107)
CO2 SERPL-SCNC: 28 MMOL/L (ref 22–29)
COLOR UR AUTO: YELLOW
CREAT SERPL-MCNC: 0.81 MG/DL (ref 0.55–1.02)
EOSINOPHIL # BLD AUTO: 0.2 X10(3)/MCL (ref 0–0.9)
EOSINOPHIL NFR BLD AUTO: 3.4 %
ERYTHROCYTE [DISTWIDTH] IN BLOOD BY AUTOMATED COUNT: 12.3 % (ref 11.5–17)
GFR SERPLBLD CREATININE-BSD FMLA CKD-EPI: >60 MLS/MIN/1.73/M2
GLOBULIN SER-MCNC: 3.4 GM/DL (ref 2.4–3.5)
GLUCOSE SERPL-MCNC: 96 MG/DL (ref 74–100)
GLUCOSE UR QL STRIP.AUTO: NEGATIVE MG/DL
HCT VFR BLD AUTO: 38.4 % (ref 37–47)
HGB BLD-MCNC: 13.2 G/DL (ref 12–16)
IMM GRANULOCYTES # BLD AUTO: 0.01 X10(3)/MCL (ref 0–0.04)
IMM GRANULOCYTES NFR BLD AUTO: 0.2 %
KETONES UR QL STRIP.AUTO: NEGATIVE MG/DL
LEUKOCYTE ESTERASE UR QL STRIP.AUTO: NEGATIVE UNIT/L
LYMPHOCYTES # BLD AUTO: 2.19 X10(3)/MCL (ref 0.6–4.6)
LYMPHOCYTES NFR BLD AUTO: 37.4 %
MAGNESIUM SERPL-MCNC: 2 MG/DL (ref 1.6–2.6)
MCH RBC QN AUTO: 30.1 PG
MCHC RBC AUTO-ENTMCNC: 34.4 G/DL (ref 33–36)
MCV RBC AUTO: 87.7 FL (ref 80–94)
MONOCYTES # BLD AUTO: 0.52 X10(3)/MCL (ref 0.1–1.3)
MONOCYTES NFR BLD AUTO: 8.9 %
NEUTROPHILS # BLD AUTO: 2.88 X10(3)/MCL (ref 2.1–9.2)
NEUTROPHILS NFR BLD AUTO: 49.2 %
NITRITE UR QL STRIP.AUTO: NEGATIVE
NRBC BLD AUTO-RTO: 0 %
PH UR STRIP.AUTO: 6 [PH]
PLATELET # BLD AUTO: 240 X10(3)/MCL (ref 130–400)
PMV BLD AUTO: 10.6 FL (ref 7.4–10.4)
POTASSIUM SERPL-SCNC: 3.7 MMOL/L (ref 3.5–5.1)
PROT SERPL-MCNC: 7.4 GM/DL (ref 6.4–8.3)
PROT UR QL STRIP.AUTO: NEGATIVE MG/DL
RBC # BLD AUTO: 4.38 X10(6)/MCL (ref 4.2–5.4)
RBC #/AREA URNS AUTO: <5 /HPF
RBC UR QL AUTO: NEGATIVE UNIT/L
SODIUM SERPL-SCNC: 140 MMOL/L (ref 136–145)
SP GR UR STRIP.AUTO: 1.01 (ref 1–1.03)
SQUAMOUS #/AREA URNS AUTO: <5 /HPF
TROPONIN I SERPL-MCNC: <0.01 NG/ML (ref 0–0.04)
UROBILINOGEN UR STRIP-ACNC: 0.2 MG/DL
WBC # SPEC AUTO: 5.9 X10(3)/MCL (ref 4.5–11.5)
WBC #/AREA URNS AUTO: <5 /HPF

## 2023-03-03 PROCEDURE — 80053 COMPREHEN METABOLIC PANEL: CPT | Performed by: PHYSICIAN ASSISTANT

## 2023-03-03 PROCEDURE — 93010 ELECTROCARDIOGRAM REPORT: CPT | Mod: ,,, | Performed by: INTERNAL MEDICINE

## 2023-03-03 PROCEDURE — 84484 ASSAY OF TROPONIN QUANT: CPT | Performed by: PHYSICIAN ASSISTANT

## 2023-03-03 PROCEDURE — 99285 EMERGENCY DEPT VISIT HI MDM: CPT | Mod: 25

## 2023-03-03 PROCEDURE — 81001 URINALYSIS AUTO W/SCOPE: CPT | Performed by: PHYSICIAN ASSISTANT

## 2023-03-03 PROCEDURE — 93010 EKG 12-LEAD: ICD-10-PCS | Mod: ,,, | Performed by: INTERNAL MEDICINE

## 2023-03-03 PROCEDURE — 93005 ELECTROCARDIOGRAM TRACING: CPT

## 2023-03-03 PROCEDURE — 83735 ASSAY OF MAGNESIUM: CPT | Performed by: PHYSICIAN ASSISTANT

## 2023-03-03 PROCEDURE — 85025 COMPLETE CBC W/AUTO DIFF WBC: CPT | Performed by: PHYSICIAN ASSISTANT

## 2023-03-03 NOTE — FIRST PROVIDER EVALUATION
"Medical screening examination initiated.  I have conducted a focused provider triage encounter, findings are as follows:    Brief history of present illness:  37 yo female presents to ED for evaluation near syncope and chest pain starting today. States started while washing dishes. Hx of Lupus.    Vitals:    03/03/23 1554   BP: (!) 149/81   BP Location: Left arm   Pulse: 74   Resp: 20   Temp: 98.2 °F (36.8 °C)   TempSrc: Oral   SpO2: 100%   Weight: 90.7 kg (200 lb)   Height: 5' 6" (1.676 m)       Pertinent physical exam:  Patient is awake and alert and oriented.  Ambulatory to triage.  In no acute distress.      Brief workup plan:  labs, EKG, CXR, UA, UPT    Preliminary workup initiated; this workup will be continued and followed by the physician or advanced practice provider that is assigned to the patient when roomed.  "
16

## 2023-03-13 ENCOUNTER — HOSPITAL ENCOUNTER (EMERGENCY)
Facility: HOSPITAL | Age: 38
Discharge: HOME | End: 2023-03-14
Attending: STUDENT IN AN ORGANIZED HEALTH CARE EDUCATION/TRAINING PROGRAM
Payer: MEDICAID

## 2023-03-13 VITALS
BODY MASS INDEX: 32.14 KG/M2 | HEART RATE: 113 BPM | WEIGHT: 200 LBS | OXYGEN SATURATION: 98 % | TEMPERATURE: 99 F | RESPIRATION RATE: 16 BRPM | DIASTOLIC BLOOD PRESSURE: 81 MMHG | SYSTOLIC BLOOD PRESSURE: 130 MMHG | HEIGHT: 66 IN

## 2023-03-13 DIAGNOSIS — M79.605 LEFT LEG PAIN: ICD-10-CM

## 2023-03-13 DIAGNOSIS — L03.116 LEFT LEG CELLULITIS: Primary | ICD-10-CM

## 2023-03-13 LAB
ALBUMIN SERPL-MCNC: 4.3 G/DL (ref 3.5–5)
ALBUMIN/GLOB SERPL: 1.3 RATIO (ref 1.1–2)
ALP SERPL-CCNC: 66 UNIT/L (ref 40–150)
ALT SERPL-CCNC: 24 UNIT/L (ref 0–55)
APPEARANCE UR: CLEAR
AST SERPL-CCNC: 14 UNIT/L (ref 5–34)
BACTERIA #/AREA URNS AUTO: NORMAL /HPF
BASOPHILS # BLD AUTO: 0.04 X10(3)/MCL (ref 0–0.2)
BASOPHILS NFR BLD AUTO: 0.4 %
BILIRUB UR QL STRIP.AUTO: NEGATIVE MG/DL
BILIRUBIN DIRECT+TOT PNL SERPL-MCNC: 0.7 MG/DL
BUN SERPL-MCNC: 12.4 MG/DL (ref 7–18.7)
CALCIUM SERPL-MCNC: 9.7 MG/DL (ref 8.4–10.2)
CHLORIDE SERPL-SCNC: 100 MMOL/L (ref 98–107)
CO2 SERPL-SCNC: 26 MMOL/L (ref 22–29)
COLOR UR AUTO: YELLOW
CREAT SERPL-MCNC: 0.75 MG/DL (ref 0.55–1.02)
EOSINOPHIL # BLD AUTO: 0.08 X10(3)/MCL (ref 0–0.9)
EOSINOPHIL NFR BLD AUTO: 0.8 %
ERYTHROCYTE [DISTWIDTH] IN BLOOD BY AUTOMATED COUNT: 12.2 % (ref 11.5–17)
FLUAV AG UPPER RESP QL IA.RAPID: NOT DETECTED
FLUBV AG UPPER RESP QL IA.RAPID: NOT DETECTED
GFR SERPLBLD CREATININE-BSD FMLA CKD-EPI: >60 MLS/MIN/1.73/M2
GLOBULIN SER-MCNC: 3.3 GM/DL (ref 2.4–3.5)
GLUCOSE SERPL-MCNC: 99 MG/DL (ref 74–100)
GLUCOSE UR QL STRIP.AUTO: NEGATIVE MG/DL
HCT VFR BLD AUTO: 37.8 % (ref 37–47)
HGB BLD-MCNC: 12.8 G/DL (ref 12–16)
IMM GRANULOCYTES # BLD AUTO: 0.02 X10(3)/MCL (ref 0–0.04)
IMM GRANULOCYTES NFR BLD AUTO: 0.2 %
KETONES UR QL STRIP.AUTO: NEGATIVE MG/DL
LEUKOCYTE ESTERASE UR QL STRIP.AUTO: NEGATIVE UNIT/L
LIPASE SERPL-CCNC: 16 U/L
LYMPHOCYTES # BLD AUTO: 1.42 X10(3)/MCL (ref 0.6–4.6)
LYMPHOCYTES NFR BLD AUTO: 13.7 %
MCH RBC QN AUTO: 29.8 PG
MCHC RBC AUTO-ENTMCNC: 33.9 G/DL (ref 33–36)
MCV RBC AUTO: 88.1 FL (ref 80–94)
MONOCYTES # BLD AUTO: 0.84 X10(3)/MCL (ref 0.1–1.3)
MONOCYTES NFR BLD AUTO: 8.1 %
NEUTROPHILS # BLD AUTO: 7.98 X10(3)/MCL (ref 2.1–9.2)
NEUTROPHILS NFR BLD AUTO: 76.8 %
NITRITE UR QL STRIP.AUTO: NEGATIVE
NRBC BLD AUTO-RTO: 0 %
PH UR STRIP.AUTO: 5.5 [PH]
PLATELET # BLD AUTO: 231 X10(3)/MCL (ref 130–400)
PMV BLD AUTO: 10.4 FL (ref 7.4–10.4)
POTASSIUM SERPL-SCNC: 3.7 MMOL/L (ref 3.5–5.1)
PROT SERPL-MCNC: 7.6 GM/DL (ref 6.4–8.3)
PROT UR QL STRIP.AUTO: NEGATIVE MG/DL
RBC # BLD AUTO: 4.29 X10(6)/MCL (ref 4.2–5.4)
RBC #/AREA URNS AUTO: <5 /HPF
RBC UR QL AUTO: NEGATIVE UNIT/L
SARS-COV-2 RNA RESP QL NAA+PROBE: NOT DETECTED
SODIUM SERPL-SCNC: 138 MMOL/L (ref 136–145)
SP GR UR STRIP.AUTO: 1.01 (ref 1–1.03)
SQUAMOUS #/AREA URNS AUTO: <5 /HPF
UROBILINOGEN UR STRIP-ACNC: 0.2 MG/DL
WBC # SPEC AUTO: 10.4 X10(3)/MCL (ref 4.5–11.5)
WBC #/AREA URNS AUTO: <5 /HPF

## 2023-03-13 PROCEDURE — 81001 URINALYSIS AUTO W/SCOPE: CPT | Performed by: PHYSICIAN ASSISTANT

## 2023-03-13 PROCEDURE — 80053 COMPREHEN METABOLIC PANEL: CPT | Performed by: PHYSICIAN ASSISTANT

## 2023-03-13 PROCEDURE — 99283 EMERGENCY DEPT VISIT LOW MDM: CPT

## 2023-03-13 PROCEDURE — 0240U COVID/FLU A&B PCR: CPT | Performed by: PHYSICIAN ASSISTANT

## 2023-03-13 PROCEDURE — 83690 ASSAY OF LIPASE: CPT | Performed by: PHYSICIAN ASSISTANT

## 2023-03-13 PROCEDURE — 85025 COMPLETE CBC W/AUTO DIFF WBC: CPT | Performed by: PHYSICIAN ASSISTANT

## 2023-03-14 RX ORDER — SULFAMETHOXAZOLE AND TRIMETHOPRIM 800; 160 MG/1; MG/1
1 TABLET ORAL 2 TIMES DAILY
Qty: 14 TABLET | Refills: 0 | Status: ON HOLD | OUTPATIENT
Start: 2023-03-14 | End: 2023-03-17 | Stop reason: HOSPADM

## 2023-03-14 RX ORDER — ONDANSETRON 4 MG/1
4 TABLET, FILM COATED ORAL EVERY 6 HOURS
Qty: 12 TABLET | Refills: 0 | Status: SHIPPED | OUTPATIENT
Start: 2023-03-14 | End: 2023-09-05

## 2023-03-14 RX ORDER — CEPHALEXIN 500 MG/1
500 CAPSULE ORAL 4 TIMES DAILY
Qty: 28 CAPSULE | Refills: 0 | Status: ON HOLD | OUTPATIENT
Start: 2023-03-14 | End: 2023-03-17 | Stop reason: HOSPADM

## 2023-03-14 NOTE — ED PROVIDER NOTES
Encounter Date: 3/13/2023    SCRIBE #1 NOTE: I, Nemo Strickland, am scribing for, and in the presence of,  Adam Martínez IV, MD. I have scribed the following portions of the note - Other sections scribed: HPI, ROS, PE.     History     Chief Complaint   Patient presents with    Leg Pain     Pain, edema, redness to LLE x2 days. Reports headache, chills x today. Afebrile in triage. Takes Norco 5 mg Qday. Took dose at 1800. Hx of Lupus     38 year old female with hx of lupus, seizures, and Atopic dermatitis presents to ED c/o left lower extremity pain onset 03/12/2023. She reports worsening erythema and feeling hot on her lower left leg. Pt states that she scratches at the scab on her leg and picks at it. She reports additional symptoms of fever, nausea, abdominal pain, and a headache. Pt denies dysuria, blood in urine, or vomiting. She reports she has been taking Zofran at home for treatment.     Chart Review: Pt visited ER for abdominal pain on January 13th.     The history is provided by the patient. No  was used.   Review of patient's allergies indicates:   Allergen Reactions    Ibuprofen Anaphylaxis    Metoclopramide Anaphylaxis and Other (See Comments)     Other reaction(s): Paralysis of vertical movement      Carisoprodol Other (See Comments)    Gabapentin Other (See Comments)     Other reaction(s): C/O - a headache      Clindamycin Rash    Cyclobenzaprine Palpitations     Other reaction(s): SOB and increased heart rate      Morphine Itching    Penicillin v potassium Rash     Other reaction(s): Rash      Rizatriptan Nausea And Vomiting     Past Medical History:   Diagnosis Date    ADHD (attention deficit hyperactivity disorder)     Allergy     Anxiety     Atopic dermatitis 2018    Brain tumor 2008    Depression     Endometriosis     GERD (gastroesophageal reflux disease)     IC (interstitial cystitis)     Obsessive-compulsive disorder     Seizures     Systemic lupus erythematosus, organ or  system involvement unspecified      Past Surgical History:   Procedure Laterality Date    AUGMENTATION OF BREAST      CERVICAL FUSION      HYSTERECTOMY      LAPAROSCOPIC DRAINAGE OF CYST OF OVARY      NASAL SEPTOPLASTY  2007     Family History   Problem Relation Age of Onset    Drug abuse Mother     Hypertension Mother     Heart disease Father     Hypertension Father     Alcohol abuse Father     Early death Father      Social History     Tobacco Use    Smoking status: Former    Smokeless tobacco: Never   Substance Use Topics    Alcohol use: Yes    Drug use: Never     Review of Systems   Constitutional:  Positive for fever. Negative for chills.   HENT:  Negative for congestion, rhinorrhea and sore throat.    Eyes:  Negative for visual disturbance.   Respiratory:  Negative for cough and shortness of breath.    Cardiovascular:  Negative for chest pain and leg swelling.   Gastrointestinal:  Positive for abdominal pain and nausea. Negative for vomiting.   Genitourinary:  Negative for dysuria, hematuria, vaginal bleeding and vaginal discharge.   Musculoskeletal:  Negative for joint swelling.   Skin:  Negative for rash.   Neurological:  Positive for headaches. Negative for weakness.   Psychiatric/Behavioral:  Negative for confusion.      Physical Exam     Initial Vitals [03/13/23 2017]   BP Pulse Resp Temp SpO2   130/81 (!) 113 16 99.4 °F (37.4 °C) 98 %      MAP       --         Physical Exam    Nursing note and vitals reviewed.  Constitutional: She is not diaphoretic. No distress.   Cardiovascular:  Normal rate and regular rhythm.           No murmur heard.  Pulmonary/Chest: Breath sounds normal. No respiratory distress. She has no wheezes. She has no rales.   Abdominal: Abdomen is soft. She exhibits no distension. There is no abdominal tenderness.     Neurological: She is alert.   Skin:   Multiple scabs and various stages of chronicity. Left Leg has 1 scab with several cm  surrounding erythema, induration and warmth.  Tenderness to palpation    Psychiatric: She has a normal mood and affect.       ED Course   Procedures  Labs Reviewed   LIPASE - Normal   URINALYSIS, REFLEX TO URINE CULTURE - Normal   COVID/FLU A&B PCR - Normal    Narrative:     The Xpert Xpress SARS-CoV-2/FLU/RSV plus is a rapid, multiplexed real-time PCR test intended for the simultaneous qualitative detection and differentiation of SARS-CoV-2, Influenza A, Influenza B, and respiratory syncytial virus (RSV) viral RNA in either nasopharyngeal swab or nasal swab specimens.         URINALYSIS, MICROSCOPIC - Normal   CBC W/ AUTO DIFFERENTIAL    Narrative:     The following orders were created for panel order CBC auto differential.  Procedure                               Abnormality         Status                     ---------                               -----------         ------                     CBC with Differential[[id number]]                            Final result                 Please view results for these tests on the individual orders.   COMPREHENSIVE METABOLIC PANEL   CBC WITH DIFFERENTIAL          Imaging Results    None          Medications - No data to display           Scribe Attestation:   Scribe #1: I performed the above scribed service and the documentation accurately describes the services I performed. I attest to the accuracy of the note.    Attending Attestation:           Physician Attestation for Scribe:  Physician Attestation Statement for Scribe #1: I, Adam Martínez IV, MD, reviewed documentation, as scribed by Nemo Strickland in my presence, and it is both accurate and complete.         Medical Decision Making  Problems Addressed:  Left leg cellulitis: acute illness or injury that poses a threat to life or bodily functions  Left leg pain: acute illness or injury that poses a threat to life or bodily functions      ED assessment:    37 yo with a couple of days of LLE swelling, erythema  Exam with uncomplicated cellulitis  Not systemically  ill  Discharged on PO abx, return precautions given     Differential diagnosis (including but not limited to):   Cellluitis, abscess, rash, bug bite     ED management:   Workup as above     Amount and/or Complexity of Data Reviewed  External data reviewed: notes from previous ED visits, prior labs, and prescription medications   Summary of data reviewed: Pt visited ER for abdominal pain on January 13th.   Risk and benefits of testing: discussed   Labs: ordered and reviewed    Risk  Prescription drug management     Critical Care  none    I, Adam Martínez MD personally performed the history, PE, MDM, and procedures as documented above and agree with the scribe's documentation. =                 Clinical Impression:   Final diagnoses:  [M79.605] Left leg pain  [L03.116] Left leg cellulitis (Primary)        ED Disposition Condition    Discharge Stable          ED Prescriptions       Medication Sig Dispense Start Date End Date Auth. Provider    cephALEXin (KEFLEX) 500 MG capsule Take 1 capsule (500 mg total) by mouth 4 (four) times daily. for 7 days 28 capsule 3/14/2023 3/21/2023 Adam Martínez IV, MD    sulfamethoxazole-trimethoprim 800-160mg (BACTRIM DS) 800-160 mg Tab Take 1 tablet by mouth 2 (two) times daily. for 7 days 14 tablet 3/14/2023 3/21/2023 Adam Martínez IV, MD    ondansetron (ZOFRAN) 4 MG tablet Take 1 tablet (4 mg total) by mouth every 6 (six) hours. 12 tablet 3/14/2023 -- Adam Martínez IV, MD          Follow-up Information       Follow up With Specialties Details Why Contact Info    Nkiki Aguayo MD General Surgery Schedule an appointment as soon as possible for a visit   [address] [facility] [address]  [phone]      [facility] - Emergency Dept Emergency Medicine Go to  If symptoms worsen [address], [place] [address] [phone]             Adam Martínez IV, MD  03/14/23 6585

## 2023-03-14 NOTE — DISCHARGE INSTRUCTIONS
Thanks for letting use take care of you today! It is our goal to give you courteous care and to keep you comfortable and informed. If you have any questions before you leave I will be happy to try and answer them.     Advice after your visit:  Your visit in the emergency department is NOT definitive care - please follow-up with your primary care doctor and/or specialist within 1-2 days. If you do not have a primary care physician call [phone] to schedule an appointment. Please return if you have any worsening in your condition or if you have any other concerns.    Return to the emergency department if any worsening symptoms including fever, chest pain, difficulty breathing, weakness, numbness, tingling, nausea, vomiting, inability to eat, drink or take your medication, or any other new symptoms or concerns arise.      Please signup for MyChart as noted below in your paperwork to review all labwork, imaging results, and any other incidental findings from today's visit.     If you had radiology exams like an XRAY or CT in the emergency Department the interpreation on them may be preliminary - there may be less time sensitive findings on the reports please obtain these reports within 24 hours from the hospital or by using your out on your mobile phone to access records.  Bring these to your primary care doctor and/or specialist for further review of incidental findings.    Please review any LAB WORK from your visit today with your primary care physician.    If you were prescribed OPIATE PAIN MEDICATION - please understand of these medications can be addictive, you may fill less of the prescription was written for, you do not have to take the full prescription.  You may discard what you do not use.  Please seek help if you feel you are having problems with addiction.  Do not drive or operate heavy machinery if you are taking sedating medications.  Do not mix these medications with alcohol.      If you had a SPLINT  placed in the emergency department if you have severe pain numbness tingling or discoloration of year digits please remove the splint and return to the emergency department for further evaluation as this may represent a sign of compromise to the nerves or blood vessels due to swelling.    If you had SUTURES in the emergency department please have them removed in the prescribed time frame typically within 7-14 days.  You may shower but please do not bathe or swim.  Keep the wounds clean and dry and covered with a clean dressing.  Please return if he have any signs of infection like redness or drainage or pain at the suture site.    Please take the full course of  any ANTIBIOTICS you were prescribed - incomplete courses of antibiotics can cause resistance to antibiotics in the future which will make it difficult to treat any infections you may have.

## 2023-03-14 NOTE — FIRST PROVIDER EVALUATION
"Medical screening examination initiated.  I have conducted a focused provider triage encounter, findings are as follows:    Chief Complaint   Patient presents with    Leg Pain     Pain, edema, redness to LLE x2 days. Reports headache, chills x today. Afebrile in triage. Takes Norco 5 mg Qday. Took dose at 1800. Hx of Lupus     Brief history of present illness:  38 y.o. female presents to the ED with pain, swelling, and redness to LLE onset yesterday with worsening today. Also now c/o headache and abdominal pain.  Last Norco at 1800.     Vitals:    03/13/23 2017   BP: 130/81   Pulse: (!) 113   Resp: 16   Temp: 99.4 °F (37.4 °C)   TempSrc: Oral   SpO2: 98%   Weight: 90.7 kg (200 lb)   Height: 5' 6" (1.676 m)       Pertinent physical exam:  Awake, alert, ambulatory, non-labored respirations, erythema to right lower leg    Brief workup plan:  labs, UA    Preliminary workup initiated; this workup will be continued and followed by the physician or advanced practice provider that is assigned to the patient when roomed.  "

## 2023-03-15 ENCOUNTER — HOSPITAL ENCOUNTER (OUTPATIENT)
Facility: HOSPITAL | Age: 38
Discharge: HOME | End: 2023-03-17
Attending: INTERNAL MEDICINE | Admitting: INTERNAL MEDICINE
Payer: MEDICAID

## 2023-03-15 DIAGNOSIS — T78.40XA ALLERGIC REACTION, INITIAL ENCOUNTER: Primary | ICD-10-CM

## 2023-03-15 DIAGNOSIS — L03.90 CELLULITIS: ICD-10-CM

## 2023-03-15 DIAGNOSIS — R07.9 CHEST PAIN: ICD-10-CM

## 2023-03-15 LAB
ALBUMIN SERPL-MCNC: 4.1 G/DL (ref 3.5–5)
ALBUMIN/GLOB SERPL: 1 RATIO (ref 1.1–2)
ALP SERPL-CCNC: 62 UNIT/L (ref 40–150)
ALT SERPL-CCNC: 24 UNIT/L (ref 0–55)
AST SERPL-CCNC: 18 UNIT/L (ref 5–34)
BASOPHILS # BLD AUTO: 0.03 X10(3)/MCL (ref 0–0.2)
BASOPHILS NFR BLD AUTO: 0.4 %
BILIRUBIN DIRECT+TOT PNL SERPL-MCNC: 0.5 MG/DL
BUN SERPL-MCNC: 10.1 MG/DL (ref 7–18.7)
CALCIUM SERPL-MCNC: 9.5 MG/DL (ref 8.4–10.2)
CHLORIDE SERPL-SCNC: 105 MMOL/L (ref 98–107)
CO2 SERPL-SCNC: 28 MMOL/L (ref 22–29)
CREAT SERPL-MCNC: 0.8 MG/DL (ref 0.55–1.02)
EOSINOPHIL # BLD AUTO: 0.33 X10(3)/MCL (ref 0–0.9)
EOSINOPHIL NFR BLD AUTO: 4.6 %
ERYTHROCYTE [DISTWIDTH] IN BLOOD BY AUTOMATED COUNT: 11.9 % (ref 11.5–17)
GFR SERPLBLD CREATININE-BSD FMLA CKD-EPI: >60 MLS/MIN/1.73/M2
GLOBULIN SER-MCNC: 4 GM/DL (ref 2.4–3.5)
GLUCOSE SERPL-MCNC: 91 MG/DL (ref 74–100)
HCT VFR BLD AUTO: 36.8 % (ref 37–47)
HGB BLD-MCNC: 12.7 G/DL (ref 12–16)
IMM GRANULOCYTES # BLD AUTO: 0.02 X10(3)/MCL (ref 0–0.04)
IMM GRANULOCYTES NFR BLD AUTO: 0.3 %
LACTATE SERPL-SCNC: 0.7 MMOL/L (ref 0.5–2.2)
LYMPHOCYTES # BLD AUTO: 1.94 X10(3)/MCL (ref 0.6–4.6)
LYMPHOCYTES NFR BLD AUTO: 27 %
MCH RBC QN AUTO: 30.4 PG
MCHC RBC AUTO-ENTMCNC: 34.5 G/DL (ref 33–36)
MCV RBC AUTO: 88 FL (ref 80–94)
MONOCYTES # BLD AUTO: 0.79 X10(3)/MCL (ref 0.1–1.3)
MONOCYTES NFR BLD AUTO: 11 %
NEUTROPHILS # BLD AUTO: 4.08 X10(3)/MCL (ref 2.1–9.2)
NEUTROPHILS NFR BLD AUTO: 56.7 %
NRBC BLD AUTO-RTO: 0 %
PLATELET # BLD AUTO: 256 X10(3)/MCL (ref 130–400)
PMV BLD AUTO: 10.5 FL (ref 7.4–10.4)
POTASSIUM SERPL-SCNC: 3.5 MMOL/L (ref 3.5–5.1)
PROT SERPL-MCNC: 8.1 GM/DL (ref 6.4–8.3)
RBC # BLD AUTO: 4.18 X10(6)/MCL (ref 4.2–5.4)
SODIUM SERPL-SCNC: 139 MMOL/L (ref 136–145)
WBC # SPEC AUTO: 7.2 X10(3)/MCL (ref 4.5–11.5)

## 2023-03-15 PROCEDURE — 80053 COMPREHEN METABOLIC PANEL: CPT | Performed by: NURSE PRACTITIONER

## 2023-03-15 PROCEDURE — 96366 THER/PROPH/DIAG IV INF ADDON: CPT

## 2023-03-15 PROCEDURE — 99285 EMERGENCY DEPT VISIT HI MDM: CPT

## 2023-03-15 PROCEDURE — 25000003 PHARM REV CODE 250

## 2023-03-15 PROCEDURE — 96365 THER/PROPH/DIAG IV INF INIT: CPT

## 2023-03-15 PROCEDURE — 63600175 PHARM REV CODE 636 W HCPCS: Mod: TB,JG | Performed by: INTERNAL MEDICINE

## 2023-03-15 PROCEDURE — 85025 COMPLETE CBC W/AUTO DIFF WBC: CPT | Performed by: NURSE PRACTITIONER

## 2023-03-15 PROCEDURE — 63600175 PHARM REV CODE 636 W HCPCS

## 2023-03-15 PROCEDURE — 83605 ASSAY OF LACTIC ACID: CPT | Performed by: NURSE PRACTITIONER

## 2023-03-15 PROCEDURE — G0378 HOSPITAL OBSERVATION PER HR: HCPCS

## 2023-03-15 PROCEDURE — 25000003 PHARM REV CODE 250: Performed by: INTERNAL MEDICINE

## 2023-03-15 RX ORDER — MAG HYDROX/ALUMINUM HYD/SIMETH 200-200-20
30 SUSPENSION, ORAL (FINAL DOSE FORM) ORAL 4 TIMES DAILY PRN
Status: CANCELLED | OUTPATIENT
Start: 2023-03-15

## 2023-03-15 RX ORDER — FAMOTIDINE 20 MG/1
20 TABLET, FILM COATED ORAL
Status: COMPLETED | OUTPATIENT
Start: 2023-03-15 | End: 2023-03-15

## 2023-03-15 RX ORDER — DIPHENHYDRAMINE HCL 25 MG
25 CAPSULE ORAL
Status: COMPLETED | OUTPATIENT
Start: 2023-03-15 | End: 2023-03-15

## 2023-03-15 RX ORDER — PREDNISONE 20 MG/1
40 TABLET ORAL
Status: COMPLETED | OUTPATIENT
Start: 2023-03-15 | End: 2023-03-15

## 2023-03-15 RX ADMIN — FAMOTIDINE 20 MG: 20 TABLET, FILM COATED ORAL at 06:03

## 2023-03-15 RX ADMIN — DIPHENHYDRAMINE HYDROCHLORIDE 25 MG: 25 CAPSULE ORAL at 06:03

## 2023-03-15 RX ADMIN — VANCOMYCIN HYDROCHLORIDE 2250 MG: 1.25 INJECTION, POWDER, LYOPHILIZED, FOR SOLUTION INTRAVENOUS at 10:03

## 2023-03-15 RX ADMIN — PREDNISONE 40 MG: 20 TABLET ORAL at 06:03

## 2023-03-15 NOTE — FIRST PROVIDER EVALUATION
Medical screening examination initiated.  I have conducted a focused provider triage encounter, findings are as follows:    Brief history of present illness:  37 y/o female who presents with worsening left lower leg cellulitis she states. On bactrim and keflex for 2 days. Now also itching and feels as though she is having allergic reaction to one of the meds.     There were no vitals filed for this visit.    Pertinent physical exam:  alert, nonlabored, left lower leg has area of what appears to be abscess/induration with surrounding erythema but also has rash which resembles allergic reaction    Brief workup plan:  labs    Preliminary workup initiated; this workup will be continued and followed by the physician or advanced practice provider that is assigned to the patient when roomed.

## 2023-03-15 NOTE — ED PROVIDER NOTES
Encounter Date: 3/15/2023       History     Chief Complaint   Patient presents with    Rash     Pt to ER via POV for rash.  Was treated 2 days ago for cellulitis to left leg.  Today increased pain and swelling and itching.     38 y.o. White female with a history of atopic dermatitis, seizures, and SLE presents to Emergency Department with a chief complaint of rash. Symptoms began 2 days ago after starting antibiotics for cellulitis and have been constant since onset. Also, patient reports LLE redness, swelling, and pain are worsening despite antibiotic use. Associated symptoms include pruritis. Symptoms are aggravated with palpation and movement and there are no alleviating factors. The patient denies fever, chills, CP, SOB, weakness, dizziness, or vomiting. No other reported symptoms at this time      The history is provided by the patient. No  was used.   Rash   This is a new problem. The current episode started two days ago. The problem has been unchanged. The problem is associated with new medications. The rash is present on the torso, face, right lower leg, left lower leg and trunk. Associated symptoms include itching and pain. Risk factors include new medications.   Review of patient's allergies indicates:   Allergen Reactions    Ibuprofen Anaphylaxis     Other reaction(s): Not Indicated    Metoclopramide Anaphylaxis and Other (See Comments)     Other reaction(s): Paralysis of vertical movement      Carisoprodol Other (See Comments)    Doxycycline      Other reaction(s): Chest pain  Other reaction(s): Chest pain      Gabapentin Other (See Comments)     Other reaction(s): C/O - a headache      Metoclopramide hcl      Other reaction(s): Respiratory arrest    Sucralfate      Chest discomfort    Sulfamethoxazole-trimethoprim      Other reaction(s): hives  Other reaction(s): hives      Clindamycin Rash    Cyclobenzaprine Palpitations     Other reaction(s): SOB and increased heart rate       Morphine Itching    Penicillin v potassium Rash     Other reaction(s): Rash      Rizatriptan Nausea And Vomiting     Past Medical History:   Diagnosis Date    ADHD (attention deficit hyperactivity disorder)     Allergy     Anxiety     Atopic dermatitis 2018    Brain tumor 2008    Depression     Endometriosis     GERD (gastroesophageal reflux disease)     IC (interstitial cystitis)     Obsessive-compulsive disorder     Seizures     Systemic lupus erythematosus, organ or system involvement unspecified      Past Surgical History:   Procedure Laterality Date    AUGMENTATION OF BREAST      CERVICAL FUSION      HYSTERECTOMY      LAPAROSCOPIC DRAINAGE OF CYST OF OVARY      NASAL SEPTOPLASTY  2007     Family History   Problem Relation Age of Onset    Drug abuse Mother     Hypertension Mother     Heart disease Father     Hypertension Father     Alcohol abuse Father     Early death Father      Social History     Tobacco Use    Smoking status: Former    Smokeless tobacco: Never   Substance Use Topics    Alcohol use: Yes    Drug use: Never     Review of Systems   Constitutional:  Negative for chills, fatigue and fever.   Eyes:  Negative for photophobia and visual disturbance.   Respiratory:  Negative for chest tightness, shortness of breath, wheezing and stridor.    Cardiovascular:  Negative for chest pain and leg swelling.   Gastrointestinal:  Negative for abdominal pain, nausea and vomiting.   Musculoskeletal:  Positive for arthralgias. Negative for gait problem and joint swelling.   Skin:  Positive for color change, itching, rash and wound.   All other systems reviewed and are negative.    Physical Exam     Initial Vitals [03/15/23 1559]   BP Pulse Resp Temp SpO2   (!) 141/80 91 18 98.3 °F (36.8 °C) 100 %      MAP       --         Physical Exam    Nursing note and vitals reviewed.  Constitutional: She appears well-developed and well-nourished. She is not diaphoretic. She is cooperative.  Non-toxic appearance. No distress.    HENT:   Head: Normocephalic and atraumatic.   Right Ear: External ear normal.   Left Ear: External ear normal.   Nose: Nose normal.   Mouth/Throat: Oropharynx is clear and moist. No oropharyngeal exudate.   Eyes: Conjunctivae and EOM are normal. Pupils are equal, round, and reactive to light. Right eye exhibits no discharge. Left eye exhibits no discharge.   Neck: Neck supple. No thyromegaly present. No tracheal deviation present. No JVD present.   Normal range of motion.  Cardiovascular:  Normal rate, regular rhythm, S1 normal, S2 normal, normal heart sounds, intact distal pulses and normal pulses.           Pulmonary/Chest: Effort normal and breath sounds normal. No accessory muscle usage. No tachypnea. No respiratory distress. She has no decreased breath sounds. She has no wheezes. She has no rhonchi. She has no rales. She exhibits no tenderness.   Abdominal: Abdomen is soft. Bowel sounds are normal. She exhibits no distension. There is no abdominal tenderness. There is no guarding.   Musculoskeletal:         General: Tenderness present. No edema. Normal range of motion.      Cervical back: Normal range of motion and neck supple.     Neurological: She is alert and oriented to person, place, and time. She has normal strength. No sensory deficit. GCS score is 15. GCS eye subscore is 4. GCS verbal subscore is 5. GCS motor subscore is 6.   Skin: Skin is warm and dry. Capillary refill takes less than 2 seconds. Rash noted. Rash is urticarial. There is erythema.   Patient has urticarial rash to entire body. Area of redness, induration, warmth, swelling, and tenderness noted to LLE and L ankle. Multiple scabs in various stages of chronicity noted to BLE.    Psychiatric: She has a normal mood and affect. Thought content normal.                     ED Course   Procedures  Labs Reviewed   CBC WITH DIFFERENTIAL - Abnormal; Notable for the following components:       Result Value    RBC 4.18 (*)     Hct 36.8 (*)     MPV  10.5 (*)     All other components within normal limits   COMPREHENSIVE METABOLIC PANEL - Abnormal; Notable for the following components:    Globulin 4.0 (*)     Albumin/Globulin Ratio 1.0 (*)     All other components within normal limits   LACTIC ACID, PLASMA - Normal          Imaging Results    None          Medications   predniSONE tablet 40 mg (40 mg Oral Given 3/15/23 1840)   diphenhydrAMINE capsule 25 mg (25 mg Oral Given 3/15/23 1839)   famotidine tablet 20 mg (20 mg Oral Given 3/15/23 1839)     Medical Decision Making:   History:   Old Records Summarized: records from another hospital.       <> Summary of Records: Patient seen at [facility] 2 days ago and diagnosed with cellulitis. Prescribed Keflex and Bactrim.   Initial Assessment:   Patient awake, alert, has non-labored breathing, and follows commands appropriately. C/o LLE tenderness, swelling, warmth, and redness. Reports symptoms have worsened. Also, c/o allergic reaction, c/o urticarial rash that began 2 days ago after taking antibiotics. NAD noted.   Differential Diagnosis:   Cellulitis, Allergic Reaction, Abrasions  Clinical Tests:   Lab Tests: Ordered and Reviewed  ED Management:  Co-morbidities and/or factors adding to the complexity or risk for the patient?: none  Differential diagnoses: Cellulitis, Allergic Reaction, Abrasions  Decision to obtain previous or outside records?: I reviewed previous records.  Chart Review (nursing home, outside records, CareEverywhere): yes  Labs/imaging/other tests obtained/considered (risk/benefits of testing discussed): cbc, cmp, lactic   Labs/tests intepretation: Labs unremarkable. Informed patient of results.   My independent imaging interpretation: none  Treatment/interventions, IV fluids, IV medications: Prednisone, Benadryl, and Pepcid given in ER with improvement of allergic reaction symptoms.   Complex management (IV controlled substances, went to OR, DNR, meds requiring monitoring, transfer, etc)?:  none  Workup/treatment affected by social determinants of health?:none   Point of care US done/interpretation: none  Consults/radiologist/EMS/social work/family discussion/alternate history: none  Advanced care planning/end of life discussion: none  Shared decision making: Discussed plan of care and interventions with patient. Agreed to and aware of plan of care. Comfortable being admitted to hospital; reports she wants to be admitted.   ETOH/smoking/drug cessation discussion: none  Dispo: Patient admitted to hospital for observation.     Other:   I discussed test(s) with the performing physician.       <> Summary of the Findings: Discussed patient with Dr. Jones who assessed patient at bedside prior to bed assignment.   I have discussed this case with another health care provider.       <> Summary of the Discussion: Discussed patient, complaints, and results with CORINA Song. Will admit to hospital medicine services under observation. Due to patient's extensive allergy list to antibiotics, instructed to order Vancomycin for treatment of cellulitis.            ED Course as of 03/15/23 2105   Wed Mar 15, 2023   2000 Patient reports symptoms have improved since med admin.  [JA]   2023 I had face to face time with this patient and independently performed a history and physical exam. I was involved in a substantive portion of this patient's ED visit and MDM I have discussed the assessment, plan, and results with both midlevel provider and patient. I agree with the assessment and plan of the midlevel provider. I agree with the history and physical exam as well as the differential/final diagnosis and disposition as documented in the chart by the midlevel provider Helga Fountain.     HPI:  38-year-old female presents for evaluation of a rash and increasing pain over the past several hours.  Patient was seen and evaluated 2 days ago for cellulitis of her left leg and was discharged home on oral antibiotics.    Physical  exam:   GEN: Awake, alert, no distress  HEENT: NC/AT  CV: RRR, no murmurs   PULM: Lungs CTAB, normal work of breathing  ABD: Abd soft, NTND  NEURO: GCS15, no focal neurologic deficits  SKIN:  Redness to face noted, redness to the left leg noted  MDM:  38-year-old female presents for re-evaluation of left leg cellulitis in addition to acute onset of increased pain and swelling and itching that started earlier today.  Labs reviewed.  Medications given for symptom improvement with improvement in her symptoms.  However, given her reaction to medications and worsening pain and redness to her leg, will proceed with admission for antibiotics and observation.  Patient agrees with plan of care and all questions answered at bedside.      Thiago Jones MD   [MC]      ED Course User Index  [JA] Helga Fountain NP  [] Thiago Jones MD                 Clinical Impression:   Final diagnoses:  [L03.90] Cellulitis  [T78.40XA] Allergic reaction, initial encounter (Primary)        ED Disposition Condition    Observation Stable                Helga Fountain NP  03/15/23 0859

## 2023-03-15 NOTE — Clinical Note
Diagnosis: Cellulitis [[id number]]   Future Attending Provider: MELLY BOLES [[id number]]   Admitting Provider:: MELLY BOLES [[id number]]   Special Needs:: No Special Needs [1]

## 2023-03-16 PROBLEM — L03.90 CELLULITIS: Status: ACTIVE | Noted: 2023-03-16

## 2023-03-16 LAB
ALBUMIN SERPL-MCNC: 3.9 G/DL (ref 3.5–5)
ALBUMIN/GLOB SERPL: 1.1 RATIO (ref 1.1–2)
ALP SERPL-CCNC: 56 UNIT/L (ref 40–150)
ALT SERPL-CCNC: 24 UNIT/L (ref 0–55)
AST SERPL-CCNC: 16 UNIT/L (ref 5–34)
BASOPHILS # BLD AUTO: 0.02 X10(3)/MCL (ref 0–0.2)
BASOPHILS NFR BLD AUTO: 0.3 %
BILIRUBIN DIRECT+TOT PNL SERPL-MCNC: 0.4 MG/DL
BUN SERPL-MCNC: 9.5 MG/DL (ref 7–18.7)
CALCIUM SERPL-MCNC: 9.8 MG/DL (ref 8.4–10.2)
CHLORIDE SERPL-SCNC: 106 MMOL/L (ref 98–107)
CO2 SERPL-SCNC: 25 MMOL/L (ref 22–29)
CREAT SERPL-MCNC: 0.77 MG/DL (ref 0.55–1.02)
EOSINOPHIL # BLD AUTO: 0 X10(3)/MCL (ref 0–0.9)
EOSINOPHIL NFR BLD AUTO: 0 %
ERYTHROCYTE [DISTWIDTH] IN BLOOD BY AUTOMATED COUNT: 11.9 % (ref 11.5–17)
GFR SERPLBLD CREATININE-BSD FMLA CKD-EPI: >60 MLS/MIN/1.73/M2
GLOBULIN SER-MCNC: 3.4 GM/DL (ref 2.4–3.5)
GLUCOSE SERPL-MCNC: 138 MG/DL (ref 74–100)
HCT VFR BLD AUTO: 36.2 % (ref 37–47)
HGB BLD-MCNC: 12.8 G/DL (ref 12–16)
IMM GRANULOCYTES # BLD AUTO: 0.02 X10(3)/MCL (ref 0–0.04)
IMM GRANULOCYTES NFR BLD AUTO: 0.3 %
LYMPHOCYTES # BLD AUTO: 0.86 X10(3)/MCL (ref 0.6–4.6)
LYMPHOCYTES NFR BLD AUTO: 11.3 %
MAGNESIUM SERPL-MCNC: 2 MG/DL (ref 1.6–2.6)
MCH RBC QN AUTO: 30.5 PG
MCHC RBC AUTO-ENTMCNC: 35.4 G/DL (ref 33–36)
MCV RBC AUTO: 86.2 FL (ref 80–94)
MONOCYTES # BLD AUTO: 0.25 X10(3)/MCL (ref 0.1–1.3)
MONOCYTES NFR BLD AUTO: 3.3 %
NEUTROPHILS # BLD AUTO: 6.43 X10(3)/MCL (ref 2.1–9.2)
NEUTROPHILS NFR BLD AUTO: 84.8 %
NRBC BLD AUTO-RTO: 0 %
PHOSPHATE SERPL-MCNC: 2.8 MG/DL (ref 2.3–4.7)
PLATELET # BLD AUTO: 273 X10(3)/MCL (ref 130–400)
PMV BLD AUTO: 10.6 FL (ref 7.4–10.4)
POTASSIUM SERPL-SCNC: 4.7 MMOL/L (ref 3.5–5.1)
PROT SERPL-MCNC: 7.3 GM/DL (ref 6.4–8.3)
RBC # BLD AUTO: 4.2 X10(6)/MCL (ref 4.2–5.4)
SODIUM SERPL-SCNC: 138 MMOL/L (ref 136–145)
WBC # SPEC AUTO: 7.6 X10(3)/MCL (ref 4.5–11.5)

## 2023-03-16 PROCEDURE — 96365 THER/PROPH/DIAG IV INF INIT: CPT

## 2023-03-16 PROCEDURE — 96372 THER/PROPH/DIAG INJ SC/IM: CPT | Mod: 59 | Performed by: NURSE PRACTITIONER

## 2023-03-16 PROCEDURE — 96366 THER/PROPH/DIAG IV INF ADDON: CPT

## 2023-03-16 PROCEDURE — G0378 HOSPITAL OBSERVATION PER HR: HCPCS

## 2023-03-16 PROCEDURE — 25000003 PHARM REV CODE 250: Performed by: NURSE PRACTITIONER

## 2023-03-16 PROCEDURE — 84100 ASSAY OF PHOSPHORUS: CPT | Performed by: NURSE PRACTITIONER

## 2023-03-16 PROCEDURE — 25000003 PHARM REV CODE 250: Performed by: INTERNAL MEDICINE

## 2023-03-16 PROCEDURE — 85025 COMPLETE CBC W/AUTO DIFF WBC: CPT | Performed by: NURSE PRACTITIONER

## 2023-03-16 PROCEDURE — 63600175 PHARM REV CODE 636 W HCPCS: Performed by: INTERNAL MEDICINE

## 2023-03-16 PROCEDURE — 83735 ASSAY OF MAGNESIUM: CPT | Performed by: NURSE PRACTITIONER

## 2023-03-16 PROCEDURE — 63600175 PHARM REV CODE 636 W HCPCS: Performed by: NURSE PRACTITIONER

## 2023-03-16 PROCEDURE — 80053 COMPREHEN METABOLIC PANEL: CPT | Performed by: NURSE PRACTITIONER

## 2023-03-16 RX ORDER — POLYETHYLENE GLYCOL 3350 17 G/17G
17 POWDER, FOR SOLUTION ORAL 2 TIMES DAILY PRN
Status: DISCONTINUED | OUTPATIENT
Start: 2023-03-16 | End: 2023-03-17 | Stop reason: HOSPADM

## 2023-03-16 RX ORDER — DIPHENHYDRAMINE HCL 25 MG
25 CAPSULE ORAL EVERY 6 HOURS PRN
Status: DISCONTINUED | OUTPATIENT
Start: 2023-03-16 | End: 2023-03-17 | Stop reason: HOSPADM

## 2023-03-16 RX ORDER — ENOXAPARIN SODIUM 100 MG/ML
40 INJECTION SUBCUTANEOUS EVERY 24 HOURS
Status: DISCONTINUED | OUTPATIENT
Start: 2023-03-16 | End: 2023-03-17 | Stop reason: HOSPADM

## 2023-03-16 RX ORDER — FLUTICASONE PROPIONATE 50 MCG
1 SPRAY, SUSPENSION (ML) NASAL DAILY
Status: DISCONTINUED | OUTPATIENT
Start: 2023-03-16 | End: 2023-03-17 | Stop reason: HOSPADM

## 2023-03-16 RX ORDER — HYDROXYCHLOROQUINE SULFATE 200 MG/1
200 TABLET, FILM COATED ORAL 2 TIMES DAILY
Status: DISCONTINUED | OUTPATIENT
Start: 2023-03-16 | End: 2023-03-17 | Stop reason: HOSPADM

## 2023-03-16 RX ORDER — TALC
6 POWDER (GRAM) TOPICAL NIGHTLY PRN
Status: DISCONTINUED | OUTPATIENT
Start: 2023-03-16 | End: 2023-03-17 | Stop reason: HOSPADM

## 2023-03-16 RX ORDER — TIZANIDINE 2 MG/1
2 TABLET ORAL NIGHTLY
Status: DISCONTINUED | OUTPATIENT
Start: 2023-03-16 | End: 2023-03-17 | Stop reason: HOSPADM

## 2023-03-16 RX ORDER — NALOXONE HCL 0.4 MG/ML
0.02 VIAL (ML) INJECTION
Status: DISCONTINUED | OUTPATIENT
Start: 2023-03-16 | End: 2023-03-17 | Stop reason: HOSPADM

## 2023-03-16 RX ORDER — BUPROPION HYDROCHLORIDE 150 MG/1
150 TABLET ORAL EVERY MORNING
Status: DISCONTINUED | OUTPATIENT
Start: 2023-03-16 | End: 2023-03-17 | Stop reason: HOSPADM

## 2023-03-16 RX ORDER — ESCITALOPRAM OXALATE 10 MG/1
20 TABLET ORAL DAILY
Status: DISCONTINUED | OUTPATIENT
Start: 2023-03-16 | End: 2023-03-17 | Stop reason: HOSPADM

## 2023-03-16 RX ORDER — PROCHLORPERAZINE EDISYLATE 5 MG/ML
5 INJECTION INTRAMUSCULAR; INTRAVENOUS EVERY 6 HOURS PRN
Status: DISCONTINUED | OUTPATIENT
Start: 2023-03-16 | End: 2023-03-17 | Stop reason: HOSPADM

## 2023-03-16 RX ORDER — ONDANSETRON 2 MG/ML
4 INJECTION INTRAMUSCULAR; INTRAVENOUS EVERY 4 HOURS PRN
Status: DISCONTINUED | OUTPATIENT
Start: 2023-03-16 | End: 2023-03-17 | Stop reason: HOSPADM

## 2023-03-16 RX ORDER — PREGABALIN 25 MG/1
25 CAPSULE ORAL 2 TIMES DAILY
Status: DISCONTINUED | OUTPATIENT
Start: 2023-03-16 | End: 2023-03-17 | Stop reason: HOSPADM

## 2023-03-16 RX ORDER — PREDNISONE 5 MG/1
5 TABLET ORAL DAILY PRN
Status: DISCONTINUED | OUTPATIENT
Start: 2023-03-16 | End: 2023-03-17 | Stop reason: HOSPADM

## 2023-03-16 RX ORDER — CLONAZEPAM 1 MG/1
1 TABLET ORAL 3 TIMES DAILY PRN
Status: DISCONTINUED | OUTPATIENT
Start: 2023-03-16 | End: 2023-03-17 | Stop reason: HOSPADM

## 2023-03-16 RX ORDER — ACETAMINOPHEN 325 MG/1
650 TABLET ORAL EVERY 6 HOURS PRN
Status: DISCONTINUED | OUTPATIENT
Start: 2023-03-16 | End: 2023-03-17 | Stop reason: HOSPADM

## 2023-03-16 RX ORDER — SIMETHICONE 80 MG
1 TABLET,CHEWABLE ORAL 4 TIMES DAILY PRN
Status: DISCONTINUED | OUTPATIENT
Start: 2023-03-16 | End: 2023-03-17 | Stop reason: HOSPADM

## 2023-03-16 RX ORDER — HYDROCODONE BITARTRATE AND ACETAMINOPHEN 10; 325 MG/1; MG/1
1 TABLET ORAL 3 TIMES DAILY PRN
Status: DISCONTINUED | OUTPATIENT
Start: 2023-03-16 | End: 2023-03-17 | Stop reason: HOSPADM

## 2023-03-16 RX ADMIN — PREGABALIN 25 MG: 25 CAPSULE ORAL at 08:03

## 2023-03-16 RX ADMIN — ESCITALOPRAM OXALATE 20 MG: 10 TABLET ORAL at 08:03

## 2023-03-16 RX ADMIN — ENOXAPARIN SODIUM 40 MG: 40 INJECTION SUBCUTANEOUS at 05:03

## 2023-03-16 RX ADMIN — CLONAZEPAM 1 MG: 1 TABLET ORAL at 08:03

## 2023-03-16 RX ADMIN — Medication 6 MG: at 10:03

## 2023-03-16 RX ADMIN — HYDROXYCHLOROQUINE SULFATE 200 MG: 200 TABLET ORAL at 08:03

## 2023-03-16 RX ADMIN — VANCOMYCIN HYDROCHLORIDE 1750 MG: 750 INJECTION, POWDER, LYOPHILIZED, FOR SOLUTION INTRAVENOUS at 10:03

## 2023-03-16 NOTE — PROGRESS NOTES
[facility]  Hospital Medicine Progress Note        Chief Complaint   Rash (Pt to ER via POV for rash.  Was treated 2 days ago for cellulitis to left leg.  Today increased pain and swelling and itching.)        History of Present Illness   Ms. Fernandez is a 38 year old female with pmh of ADHD, anxiety/depression, brain tumor, endometriosis, GERD, interstitial cystitis, seizures, lupus erythematosus, fibromyalgia, chronic back pain, chronic pain syndrome, and atopic dermatitis who presented to the ED with c/o LLE cellulitis starting 3 days ago.  Does not remember any specific trauma, but states the area has been itching and she has been scratching.  She was seen in this ED 2 days ago and was started on Bactrim and Keflex.  She states that after starting to take it, the area has become increasingly pruritic, and has gotten worse. She states that yesterday she did have some purulent drainage present.  She feels that she had an allergic reaction to the antibiotics. She also c/o subjective fever, chills. Denies chest pain, shortness of breath, difficulty swallowing or lip/tongue swelling.     Today's ED lab work was wholly unremarkable.  She was afebrile, VSS. She was started on vancomycin and admitted to hospital medicine for management.      Patient admitted on account of left lower extremity cellulitis failed outpatient treatment.  She was started on IV vancomycin.      Today's information   Patient and examined at bedside   Patient reports cellulitis is improving.  And rash is improving since she has been using Benadryl   Vitals reviewed and stable on room air   Labs reviewed and stable   Continue IV vancomycin day 2   Likely discharge home on p.o. antibiotics tomorrow       Exam  General: Awake, alert, oriented, in no acute distress, non-toxic appearing.  HEENT: NC/AT  Neck:  Supple  Chest: Clear bilaterally, no wheezing or rales, no accessory muscle use   CVS: Regular rhythm. Normal  S1/S2.  Abdomen: nondistended, normoactive BS, soft and non-tender.  MSK: No obvious deformity or joint swelling  Skin: Warm and dry. Multiple scabbed areas noted to BLE in various stages of healing. Excoriations noted to bilateral lower legs from scratching. One scabbed area to left lower leg with surrounding erythema  Neuro: AAOx3, no focal neurological deficit  Psych: Cooperative     Assessment & Plan   LLE Cellulitis failed outpatient treatment, improving   Allergic reaction           Plan:  Patient reports cellulitis is improving.  And rash is improving since she has been using Benadryl   Vitals reviewed and stable on room air   Labs reviewed and stable   Continue IV vancomycin day 2   Likely discharge home on p.o. antibiotics tomorrow   -Benadryl PRN  -Resume home meds as appropriate when available  -Labs in AM       VTE Prophylaxis: Lovenox       VITAL SIGNS: 24 HRS MIN & MAX LAST   Temp  Min: 98 °F (36.7 °C)  Max: 98.8 °F (37.1 °C) 98.8 °F (37.1 °C)   BP  Min: 108/84  Max: 141/80 111/68   Pulse  Min: 67  Max: 91  67   Resp  Min: 12  Max: 19 17   SpO2  Min: 96 %  Max: 100 % 99 %       Recent Labs   Lab 03/13/23  2041 03/15/23  1611 03/16/23  0404   WBC 10.4 7.2 7.6   RBC 4.29 4.18* 4.20   HGB 12.8 12.7 12.8   HCT 37.8 36.8* 36.2*   MCV 88.1 88.0 86.2   MCH 29.8 30.4 30.5   MCHC 33.9 34.5 35.4   RDW 12.2 11.9 11.9    256 273   MPV 10.4 10.5* 10.6*       Recent Labs   Lab 03/13/23  2041 03/15/23  1611 03/16/23  0407    139 138   K 3.7 3.5 4.7   CO2 26 28 25   BUN 12.4 10.1 9.5   CREATININE 0.75 0.80 0.77   CALCIUM 9.7 9.5 9.8   MG  --   --  2.00   ALBUMIN 4.3 4.1 3.9   ALKPHOS 66 62 56   ALT 24 24 24   AST 14 18 16   BILITOT 0.7 0.5 0.4          Microbiology Results (last 7 days)       ** No results found for the last 168 hours. **             See below for Radiology    Scheduled Med:   buPROPion  150 mg Oral QAM    enoxaparin  40 mg Subcutaneous Daily    EScitalopram oxalate  20 mg Oral Daily     fluticasone propionate  1 spray Each Nostril Daily    hydrOXYchloroQUINE  200 mg Oral BID    pregabalin  25 mg Oral BID    tiZANidine  2 mg Oral Nightly    vancomycin (VANCOCIN) IVPB  1,750 mg Intravenous Q12H        Continuous Infusions:       PRN Meds:  acetaminophen, clonazePAM, diphenhydrAMINE, HYDROcodone-acetaminophen, melatonin, naloxone, ondansetron, polyethylene glycol, predniSONE, prochlorperazine, simethicone, Pharmacy to dose Vancomycin consult **AND** vancomycin - pharmacy to dose       VTE prophylaxis:     Patient condition:  Stable/Fair/Guarded/ Serious/ Critical    Anticipated discharge and Disposition:         All diagnosis and differential diagnosis have been reviewed; assessment and plan has been documented; I have personally reviewed the labs and test results that are presently available; I have reviewed the patients medication list; I have reviewed the consulting providers response and recommendations. I have reviewed or attempted to review medical records based upon their availability    All of the patient's questions have been  addressed and answered. Patient's is agreeable to the above stated plan. I will continue to monitor closely and make adjustments to medical management as needed.  _____________________________________________________________________    Nutrition Status:    Radiology:  X-Ray Chest 1 View  Narrative: EXAMINATION:  XR CHEST 1 VIEW    CLINICAL HISTORY:  Chest pain, unspecified    TECHNIQUE:  Frontal view(s) of the chest.    COMPARISON:  Radiography 01/06/2023    FINDINGS:  Normal cardiac silhouette.  The lungs are well-inflated.  No consolidation identified.  No significant pleural effusion or discernible pneumothorax.  Impression: No acute pulmonary process identified.    Electronically signed by: Harsh Weir  Date:    03/03/2023  Time:    16:23      Audrey Curry MD   03/16/2023

## 2023-03-16 NOTE — NURSING
Received pt from ED. Pt alert x4 on RA. No distress noted. PIV to R AC infusing with Vanc. Pt updated on POC and all questions answered.

## 2023-03-16 NOTE — PROGRESS NOTES
Pharmacokinetic Initial Assessment: IV Vancomycin    Assessment/Plan:    Initiate intravenous vancomycin with loading dose of 2250 mg once followed by a maintenance dose of vancomycin 1750mg IV every 12 hours  Desired empiric serum trough concentration is 10 to 15 mcg/mL  Draw vancomycin trough level 60 min prior to fourth dose on 3/17 at approximately 0900  Pharmacy will continue to follow and monitor vancomycin.      Please contact pharmacy at extension [phone] with any questions regarding this assessment.     Thank you for the consult,   Daphne Anthony       Patient brief summary:  Mary Fernandez is a 38 y.o. female initiated on antimicrobial therapy with IV Vancomycin for treatment of suspected skin & soft tissue infection    Drug Allergies:   Review of patient's allergies indicates:   Allergen Reactions    Ibuprofen Anaphylaxis     Other reaction(s): Not Indicated    Metoclopramide Anaphylaxis and Other (See Comments)     Other reaction(s): Paralysis of vertical movement      Carisoprodol Other (See Comments)    Doxycycline      Other reaction(s): Chest pain  Other reaction(s): Chest pain      Gabapentin Other (See Comments)     Other reaction(s): C/O - a headache      Metoclopramide hcl      Other reaction(s): Respiratory arrest    Sucralfate      Chest discomfort    Sulfamethoxazole-trimethoprim      Other reaction(s): hives  Other reaction(s): hives      Clindamycin Rash    Cyclobenzaprine Palpitations     Other reaction(s): SOB and increased heart rate      Morphine Itching    Penicillin v potassium Rash     Other reaction(s): Rash      Rizatriptan Nausea And Vomiting       Actual Body Weight:   90.7 kg    Renal Function:   Estimated Creatinine Clearance: 106.1 mL/min (based on SCr of 0.8 mg/dL).,     Dialysis Method (if applicable):  N/A    CBC (last 72 hours):  Recent Labs   Lab Result Units 03/13/23  2041 03/15/23  1611   WBC x10(3)/mcL 10.4 7.2   Hgb g/dL 12.8 12.7   Hct % 37.8 36.8*   Platelet x10(3)/mcL 231  256   Mono % % 8.1 11.0   Eos % % 0.8 4.6   Basophil % % 0.4 0.4       Metabolic Panel (last 72 hours):  Recent Labs   Lab Result Units 03/13/23 2041 03/13/23  2149 03/15/23  1611   Sodium Level mmol/L 138  --  139   Potassium Level mmol/L 3.7  --  3.5   Chloride mmol/L 100  --  105   Carbon Dioxide mmol/L 26  --  28   Glucose Level mg/dL 99  --  91   Glucose, UA mg/dL  --  Negative  --    Blood Urea Nitrogen mg/dL 12.4  --  10.1   Creatinine mg/dL 0.75  --  0.80   Albumin Level g/dL 4.3  --  4.1   Bilirubin Total mg/dL 0.7  --  0.5   Alkaline Phosphatase unit/L 66  --  62   Aspartate Aminotransferase unit/L 14  --  18   Alanine Aminotransferase unit/L 24  --  24       Drug levels (last 3 results):  No results for input(s): VANCOMYCINRA, VANCORANDOM, VANCOMYCINPE, VANCOPEAK, VANCOMYCINTR, VANCOTROUGH in the last 72 hours.    Microbiologic Results:  Microbiology Results (last 7 days)       ** No results found for the last 168 hours. **

## 2023-03-16 NOTE — NURSING
Nurses Note -- 4 Eyes      3/16/2023   11:47 AM      Skin assessed during: Admit      [x] No Pressure Injuries Present    []Prevention Measures Documented      [] Yes- Altered Skin Integrity Present or Discovered   [] LDA Added if Not in Epic (Describe Wound)   [] New Altered Skin Integrity was Present on Admit and Documented in LDA   [] Wound Image Taken    Wound Care Consulted? No    Attending Nurse:  Shiloh Friedman LPN     Second RN/Staff Member:  Zahra ESCAMILLA RN

## 2023-03-16 NOTE — H&P
[facility] Hospital Medicine   History & Physical Note      Patient Name: Mary Fernandez  : 1985  MRN: [id number]  Patient Class: OP- Observation   Admission Date: 3/15/2023   Length of Stay: 0  Admitting Physician:  Service  Attending Physician: Jason Amezcua MD  PCP: Nikki Aguayo MD  Source of history: Patient, patient's family, and EMR.   Face-to-Face encounter date: 03/15/2023  Code status: --Full      Chief Complaint   Rash (Pt to ER via POV for rash.  Was treated 2 days ago for cellulitis to left leg.  Today increased pain and swelling and itching.)      History of Present Illness   Ms. Fernandez is a 38 year old female with pmh of ADHD, anxiety/depression, brain tumor, endometriosis, GERD, interstitial cystitis, seizures, lupus erythematosus, fibromyalgia, chronic back pain, chronic pain syndrome, and atopic dermatitis who presented to the ED with c/o LLE cellulitis starting 3 days ago.  Does not remember any specific trauma, but states the area has been itching and she has been scratching.  She was seen in this ED 2 days ago and was started on Bactrim and Keflex.  She states that after starting to take it, the area has become increasingly pruritic, and has gotten worse. She states that yesterday she did have some purulent drainage present.  She feels that she had an allergic reaction to the antibiotics. She also c/o subjective fever, chills. Denies chest pain, shortness of breath, difficulty swallowing or lip/tongue swelling.    Today's ED lab work was wholly unremarkable.  She was afebrile, VSS. She was started on vancomycin and admitted to hospital medicine for management.    ROS   Except as documented, all other systems reviewed and negative     Past Medical History     Past Medical History:   Diagnosis Date    ADHD (attention deficit hyperactivity disorder)     Allergy     Anxiety     Atopic dermatitis 2018    Brain tumor 2008    Depression     Endometriosis      GERD (gastroesophageal reflux disease)     IC (interstitial cystitis)     Obsessive-compulsive disorder     Seizures     Systemic lupus erythematosus, organ or system involvement unspecified        Past Surgical History     Past Surgical History:   Procedure Laterality Date    AUGMENTATION OF BREAST      CERVICAL FUSION      HYSTERECTOMY      LAPAROSCOPIC DRAINAGE OF CYST OF OVARY      NASAL SEPTOPLASTY  2007       Social History     Social History     Tobacco Use    Smoking status: Former    Smokeless tobacco: Never   Substance Use Topics    Alcohol use: Yes        Family History   Reviewed and negative    Allergies   Ibuprofen, Metoclopramide, Carisoprodol, Doxycycline, Gabapentin, Metoclopramide hcl, Sucralfate, Sulfamethoxazole-trimethoprim, Clindamycin, Cyclobenzaprine, Morphine, Penicillin v potassium, and Rizatriptan    Home Medications     Prior to Admission medications    Medication Sig Start Date End Date Taking? Authorizing Provider   albuterol (PROVENTIL/VENTOLIN HFA) 90 mcg/actuation inhaler Take 2 puffs by mouth every 6 (six) hours as needed. 4/2/19   Historical Provider   buPROPion (WELLBUTRIN XL) 150 MG TB24 tablet Take 1 tablet (150 mg total) by mouth every morning. 1/9/23 1/9/24  Mahesh Coleman MD   cephALEXin (KEFLEX) 500 MG capsule Take 1 capsule (500 mg total) by mouth 4 (four) times daily. for 7 days 3/14/23 3/21/23  Adam Martínez IV, MD   clonazePAM (KLONOPIN) 1 MG tablet Take 1 tablet (1 mg total) by mouth 3 (three) times daily as needed for Anxiety. 11/18/22   Enoch Carreon MD   dextroamphetamine-amphetamine 30 mg Tab Take 1 tablet by mouth 2 (two) times daily. 6/15/22   Historical Provider   dupilumab (DUPIXENT) 300 mg/2 mL Syrg Inject 300 mg into the skin. Every other week 2/18/19   Historical Provider   EScitalopram oxalate (LEXAPRO) 20 MG tablet Take 1 tablet (20 mg total) by mouth once daily. 1/5/23   Enoch Carreon MD   fluticasone propionate (FLONASE) 50 mcg/actuation  nasal spray 1 spray by Each Nare route once daily.    Historical Provider   HYDROcodone-acetaminophen (NORCO)  mg per tablet Take 1 tablet by mouth 3 (three) times daily as needed for Pain. 2/27/23 3/29/23  Enoch Carreon MD   hydrOXYchloroQUINE (PLAQUENIL) 200 mg tablet Take 1 tablet (200 mg total) by mouth 2 (two) times daily. 1/5/23   Enoch Carreon MD   levocetirizine (XYZAL) 5 MG tablet Take 1 tablet (5 mg total) by mouth every evening. 1/5/23 1/5/24  Enoch Carreon MD   mycophenolate (CELLCEPT) 500 mg Tab Take 2 tablets (1,000 mg total) by mouth 2 (two) times daily. 1/5/23 1/5/24  Enoch Carreon MD   ondansetron (ZOFRAN) 4 MG tablet Take 1 tablet (4 mg total) by mouth every 6 (six) hours. 3/14/23   Adam Martínez IV, MD   ondansetron (ZOFRAN) 8 MG tablet TK 1 T PO TID PRN 3/23/20   Historical Provider   pantoprazole (PROTONIX) 40 MG tablet Take 1 tablet (40 mg total) by mouth once daily. 1/13/23 2/12/23  Louisa Landrum MD   predniSONE (DELTASONE) 5 MG tablet Take 1 tablet (5 mg total) by mouth daily as needed (flare ups). AFTER BREAKFAST 1/5/23   Enoch Carreon MD   pregabalin (LYRICA) 25 MG capsule Take 1 capsule (25 mg total) by mouth 2 (two) times daily. 1/5/23 7/6/23  Enoch Carreon MD   sulfamethoxazole-trimethoprim 800-160mg (BACTRIM DS) 800-160 mg Tab Take 1 tablet by mouth 2 (two) times daily. for 7 days 3/14/23 3/21/23  Adam Martínez IV, MD   tiZANidine (ZANAFLEX) 2 MG tablet Take 1 tablet (2 mg total) by mouth nightly. 1/5/23   Enoch Carreon MD        Inpatient Medications   Scheduled Meds    Continuous Infusions    PRN Meds      Physical Exam   Vital Signs  Temp:  [98.3 °F (36.8 °C)]   Pulse:  [86-91]   Resp:  [16-18]   BP: (138-141)/(80-96)   SpO2:  [99 %-100 %]       General: Awake, alert, oriented, in no acute distress, non-toxic appearing.  HEENT: NC/AT  Neck:  Supple  Chest: Clear bilaterally, no wheezing or rales, no accessory muscle use   CVS: Regular  rhythm. Normal S1/S2.  Abdomen: nondistended, normoactive BS, soft and non-tender.  MSK: No obvious deformity or joint swelling  Skin: Warm and dry. Multiple scabbed areas noted to BLE in various stages of healing. Excoriations noted to bilateral lower legs from scratching. One scabbed area to left lower leg with surrounding erythema  Neuro: AAOx3, no focal neurological deficit  Psych: Cooperative    Labs     Recent Labs     03/13/23  2041 03/15/23  1611   WBC 10.4 7.2   RBC 4.29 4.18*   HGB 12.8 12.7   HCT 37.8 36.8*   MCV 88.1 88.0   MCH 29.8 30.4   MCHC 33.9 34.5   RDW 12.2 11.9    256     Recent Labs     03/15/23  1611   LACTIC 0.7     No results for input(s): INR, APTT, D-DIMER in the last 72 hours.  No results for input(s): HGBA1C, CHOL, TRIG, LDL, VLDL, HDL in the last 72 hours.   Recent Labs     03/13/23  2041 03/15/23  1611    139   K 3.7 3.5   CHLORIDE 100 105   CO2 26 28   BUN 12.4 10.1   CREATININE 0.75 0.80   GLUCOSE 99 91   CALCIUM 9.7 9.5   ALBUMIN 4.3 4.1   GLOBULIN 3.3 4.0*   ALKPHOS 66 62   ALT 24 24   AST 14 18   BILITOT 0.7 0.5   LIPASE 16  --      No results for input(s): BNP, CPK, TROPONINI in the last 72 hours.       Microbiology Results (last 7 days)       ** No results found for the last 168 hours. **           Imaging     No orders to display     Assessment & Plan   LLE Cellulitis failed outpatient treatment  Allergic reaction        Plan:  -Vancomycin - pharmacy to dose  -Benadryl PRN  -Resume home meds as appropriate when available  -Labs in AM      VTE Prophylaxis: Nohemi Naik NP have reviewed and discussed this case with Dr. Amezcua.  Please see addendum for further assessment and plan from attending MD.

## 2023-03-17 VITALS
RESPIRATION RATE: 20 BRPM | BODY MASS INDEX: 32.28 KG/M2 | TEMPERATURE: 98 F | WEIGHT: 200 LBS | HEART RATE: 86 BPM | OXYGEN SATURATION: 96 % | DIASTOLIC BLOOD PRESSURE: 76 MMHG | SYSTOLIC BLOOD PRESSURE: 128 MMHG

## 2023-03-17 LAB
ANION GAP SERPL CALC-SCNC: 11 MEQ/L
BASOPHILS # BLD AUTO: 0.04 X10(3)/MCL (ref 0–0.2)
BASOPHILS NFR BLD AUTO: 0.7 %
BUN SERPL-MCNC: 17.2 MG/DL (ref 7–18.7)
CALCIUM SERPL-MCNC: 9.3 MG/DL (ref 8.4–10.2)
CHLORIDE SERPL-SCNC: 107 MMOL/L (ref 98–107)
CO2 SERPL-SCNC: 23 MMOL/L (ref 22–29)
CREAT SERPL-MCNC: 0.74 MG/DL (ref 0.55–1.02)
CREAT/UREA NIT SERPL: 23
EOSINOPHIL # BLD AUTO: 0.22 X10(3)/MCL (ref 0–0.9)
EOSINOPHIL NFR BLD AUTO: 3.6 %
ERYTHROCYTE [DISTWIDTH] IN BLOOD BY AUTOMATED COUNT: 12 % (ref 11.5–17)
GFR SERPLBLD CREATININE-BSD FMLA CKD-EPI: >60 MLS/MIN/1.73/M2
GLUCOSE SERPL-MCNC: 104 MG/DL (ref 74–100)
HCT VFR BLD AUTO: 37.7 % (ref 37–47)
HGB BLD-MCNC: 12.7 G/DL (ref 12–16)
IMM GRANULOCYTES # BLD AUTO: 0.01 X10(3)/MCL (ref 0–0.04)
IMM GRANULOCYTES NFR BLD AUTO: 0.2 %
LYMPHOCYTES # BLD AUTO: 2.18 X10(3)/MCL (ref 0.6–4.6)
LYMPHOCYTES NFR BLD AUTO: 35.4 %
MCH RBC QN AUTO: 29.7 PG
MCHC RBC AUTO-ENTMCNC: 33.7 G/DL (ref 33–36)
MCV RBC AUTO: 88.1 FL (ref 80–94)
MONOCYTES # BLD AUTO: 0.62 X10(3)/MCL (ref 0.1–1.3)
MONOCYTES NFR BLD AUTO: 10.1 %
NEUTROPHILS # BLD AUTO: 3.08 X10(3)/MCL (ref 2.1–9.2)
NEUTROPHILS NFR BLD AUTO: 50 %
NRBC BLD AUTO-RTO: 0 %
PLATELET # BLD AUTO: 243 X10(3)/MCL (ref 130–400)
PMV BLD AUTO: 10.6 FL (ref 7.4–10.4)
POTASSIUM SERPL-SCNC: 4.3 MMOL/L (ref 3.5–5.1)
RBC # BLD AUTO: 4.28 X10(6)/MCL (ref 4.2–5.4)
SODIUM SERPL-SCNC: 141 MMOL/L (ref 136–145)
WBC # SPEC AUTO: 6.2 X10(3)/MCL (ref 4.5–11.5)

## 2023-03-17 PROCEDURE — G0378 HOSPITAL OBSERVATION PER HR: HCPCS

## 2023-03-17 PROCEDURE — 96366 THER/PROPH/DIAG IV INF ADDON: CPT

## 2023-03-17 PROCEDURE — 80048 BASIC METABOLIC PNL TOTAL CA: CPT | Performed by: INTERNAL MEDICINE

## 2023-03-17 PROCEDURE — 85025 COMPLETE CBC W/AUTO DIFF WBC: CPT | Performed by: INTERNAL MEDICINE

## 2023-03-17 PROCEDURE — 25000003 PHARM REV CODE 250: Performed by: INTERNAL MEDICINE

## 2023-03-17 PROCEDURE — 63600175 PHARM REV CODE 636 W HCPCS: Performed by: INTERNAL MEDICINE

## 2023-03-17 PROCEDURE — 25000003 PHARM REV CODE 250: Performed by: NURSE PRACTITIONER

## 2023-03-17 RX ORDER — LINEZOLID 600 MG/1
600 TABLET, FILM COATED ORAL EVERY 12 HOURS
Qty: 14 TABLET | Refills: 0 | Status: SHIPPED | OUTPATIENT
Start: 2023-03-17 | End: 2023-03-24

## 2023-03-17 RX ORDER — DIPHENHYDRAMINE HCL 25 MG
25 CAPSULE ORAL EVERY 6 HOURS PRN
Qty: 20 CAPSULE | Refills: 0 | Status: SHIPPED | OUTPATIENT
Start: 2023-03-17 | End: 2023-03-22

## 2023-03-17 RX ADMIN — ESCITALOPRAM OXALATE 20 MG: 10 TABLET ORAL at 09:03

## 2023-03-17 RX ADMIN — VANCOMYCIN HYDROCHLORIDE 1750 MG: 750 INJECTION, POWDER, LYOPHILIZED, FOR SOLUTION INTRAVENOUS at 09:03

## 2023-03-17 RX ADMIN — HYDROXYCHLOROQUINE SULFATE 200 MG: 200 TABLET ORAL at 09:03

## 2023-03-17 RX ADMIN — PREGABALIN 25 MG: 25 CAPSULE ORAL at 09:03

## 2023-03-17 NOTE — DISCHARGE SUMMARY
[facility]  Hospital Medicine Discharge Summary    Admit Date: 3/15/2023  Discharge Date and Time: 3/17/988824:54 AM  Admitting Physician:  Team  Discharging Physician: Audrey Curry MD.  Primary Care Physician: Nikki Aguayo MD  Consults: Hospital Medicine    Discharge Diagnoses:  LLE Cellulitis failed outpatient treatment, improving   Allergic reaction    Hospital Course:   Chief Complaint   Rash (Pt to ER via POV for rash.  Was treated 2 days ago for cellulitis to left leg.  Today increased pain and swelling and itching.)        History of Present Illness   Ms. Fernandez is a 38 year old female with pmh of ADHD, anxiety/depression, brain tumor, endometriosis, GERD, interstitial cystitis, seizures, lupus erythematosus, fibromyalgia, chronic back pain, chronic pain syndrome, and atopic dermatitis who presented to the ED with c/o LLE cellulitis starting 3 days ago.  Does not remember any specific trauma, but states the area has been itching and she has been scratching.  She was seen in this ED 2 days ago and was started on Bactrim and Keflex.  She states that after starting to take it, the area has become increasingly pruritic, and has gotten worse. She states that yesterday she did have some purulent drainage present.  She feels that she had an allergic reaction to the antibiotics. She also c/o subjective fever, chills. Denies chest pain, shortness of breath, difficulty swallowing or lip/tongue swelling.     Today's ED lab work was wholly unremarkable.  She was afebrile, VSS. She was started on vancomycin and admitted to hospital medicine for management.        Patient admitted on account of left lower extremity cellulitis failed outpatient treatment.  She was started on IV vancomycin.  Left lower extremity cellulitis improving on IV vancomycin.  Patient transition to p.o. linezolid for 7 days.  To follow up with PCP in 2 weeks        Exam  General: Awake, alert, oriented,  in no acute distress, non-toxic appearing.  HEENT: NC/AT  Neck:  Supple  Chest: Clear bilaterally, no wheezing or rales, no accessory muscle use   CVS: Regular rhythm. Normal S1/S2.  Abdomen: nondistended, normoactive BS, soft and non-tender.  MSK: No obvious deformity or joint swelling  Skin: Warm and dry. Multiple scabbed areas noted to BLE in various stages of healing. Excoriations noted to bilateral lower legs from scratching. One scabbed area to left lower leg with surrounding erythema  Neuro: AAOx3, no focal neurological deficit  Psych: Cooperative         Pt was seen and examined on the day of discharge  Vitals:  VITAL SIGNS: 24 HRS MIN & MAX LAST   Temp  Min: 97.9 °F (36.6 °C)  Max: 98.3 °F (36.8 °C) 98.1 °F (36.7 °C)   BP  Min: 103/66  Max: 128/76 128/76   Pulse  Min: 69  Max: 103  86   Resp  Min: 18  Max: 20 20   SpO2  Min: 96 %  Max: 98 % 96 %         Procedures Performed: No admission procedures for hospital encounter.     Significant Diagnostic Studies: See Full reports for all details    Recent Labs   Lab 03/15/23  1611 03/16/23  0404 03/17/23  0623   WBC 7.2 7.6 6.2   RBC 4.18* 4.20 4.28   HGB 12.7 12.8 12.7   HCT 36.8* 36.2* 37.7   MCV 88.0 86.2 88.1   MCH 30.4 30.5 29.7   MCHC 34.5 35.4 33.7   RDW 11.9 11.9 12.0    273 243   MPV 10.5* 10.6* 10.6*       Recent Labs   Lab 03/13/23  2041 03/15/23  1611 03/16/23  0407 03/17/23  0623    139 138 141   K 3.7 3.5 4.7 4.3   CO2 26 28 25 23   BUN 12.4 10.1 9.5 17.2   CREATININE 0.75 0.80 0.77 0.74   CALCIUM 9.7 9.5 9.8 9.3   MG  --   --  2.00  --    ALBUMIN 4.3 4.1 3.9  --    ALKPHOS 66 62 56  --    ALT 24 24 24  --    AST 14 18 16  --    BILITOT 0.7 0.5 0.4  --         Microbiology Results (last 7 days)       ** No results found for the last 168 hours. **             X-Ray Chest 1 View  Narrative: EXAMINATION:  XR CHEST 1 VIEW    CLINICAL HISTORY:  Chest pain, unspecified    TECHNIQUE:  Frontal view(s) of the chest.    COMPARISON:  Radiography  01/06/2023    FINDINGS:  Normal cardiac silhouette.  The lungs are well-inflated.  No consolidation identified.  No significant pleural effusion or discernible pneumothorax.  Impression: No acute pulmonary process identified.    Electronically signed by: Harsh Weir  Date:    03/03/2023  Time:    16:23         Medication List        START taking these medications      diphenhydrAMINE 25 mg capsule  Commonly known as: BENADRYL  Take 1 capsule (25 mg total) by mouth every 6 (six) hours as needed for Itching.     linezolid 600 mg Tab  Commonly known as: ZYVOX  Take 1 tablet (600 mg total) by mouth every 12 (twelve) hours. for 7 days            CHANGE how you take these medications      ondansetron 4 MG tablet  Commonly known as: ZOFRAN  Take 1 tablet (4 mg total) by mouth every 6 (six) hours.  What changed: Another medication with the same name was removed. Continue taking this medication, and follow the directions you see here.            CONTINUE taking these medications      albuterol 90 mcg/actuation inhaler  Commonly known as: PROVENTIL/VENTOLIN HFA     clonazePAM 1 MG tablet  Commonly known as: KlonoPIN  Take 1 tablet (1 mg total) by mouth 3 (three) times daily as needed for Anxiety.     dextroamphetamine-amphetamine 30 mg Tab     dupilumab 300 mg/2 mL Syrg  Commonly known as: DUPIXENT     EScitalopram oxalate 20 MG tablet  Commonly known as: LEXAPRO  Take 1 tablet (20 mg total) by mouth once daily.     fluticasone propionate 50 mcg/actuation nasal spray  Commonly known as: FLONASE     HYDROcodone-acetaminophen  mg per tablet  Commonly known as: NORCO  Take 1 tablet by mouth 3 (three) times daily as needed for Pain.     hydrOXYchloroQUINE 200 mg tablet  Commonly known as: PLAQUENIL  Take 1 tablet (200 mg total) by mouth 2 (two) times daily.     levocetirizine 5 MG tablet  Commonly known as: XYZAL  Take 1 tablet (5 mg total) by mouth every evening.     mycophenolate 500 mg Tab  Commonly known as:  CELLCEPT  Take 2 tablets (1,000 mg total) by mouth 2 (two) times daily.     predniSONE 5 MG tablet  Commonly known as: DELTASONE  Take 1 tablet (5 mg total) by mouth daily as needed (flare ups). AFTER BREAKFAST     pregabalin 25 MG capsule  Commonly known as: LYRICA  Take 1 capsule (25 mg total) by mouth 2 (two) times daily.     tiZANidine 2 MG tablet  Commonly known as: ZANAFLEX  Take 1 tablet (2 mg total) by mouth nightly.            STOP taking these medications      cephALEXin 500 MG capsule  Commonly known as: KEFLEX     pantoprazole 40 MG tablet  Commonly known as: PROTONIX     sulfamethoxazole-trimethoprim 800-160mg 800-160 mg Tab  Commonly known as: BACTRIM DS               Where to Get Your Medications        These medications were sent to [id number] [address] [address]      Phone: [phone]   diphenhydrAMINE 25 mg capsule  linezolid 600 mg Tab          Explained in detail to the patient about the discharge plan, medications, and follow-up visits. Pt understands and agrees with the treatment plan  Discharge Disposition: Home or Self Care   Discharged Condition: stable  Diet-   Dietary Orders (From admission, onward)       Start     Ordered    03/16/23 0232  Diet heart healthy  (Diet/Nutrition [facility])  Diet effective now         03/16/23 0231                   Medications Per [facility] med rec  Activities as tolerated   Follow-up Information       Nikki Aguayo MD. Go to.    Specialty: General Surgery  Why: F/u on 3/21/2023 @ 4:00PM  Contact information:  [address] [facility] [address] [phone]                           For further questions contact hospitalist office    Discharge time 33 minutes    For worsening symptoms, chest pain, shortness of breath, increased abdominal pain, high grade fever, stroke or stroke like symptoms, immediately go to the nearest Emergency Room or call 911 as soon as  possible.      Audrey Camacho M.D on 3/17/2023. at 11:54 AM.

## 2023-03-17 NOTE — PLAN OF CARE
03/17/23 1127   Discharge Assessment   Confirmed/corrected address, phone number and insurance Yes   Confirmed Demographics Correct on Facesheet   Source of Information patient   Does patient/caregiver understand observation status Yes   Communicated OTIS with patient/caregiver Yes   Reason For Admission cellulitis   People in Home child(rosi), adult  (sandra alamo, seth)   Facility Arrived From: home   Do you expect to return to your current living situation? Yes   Do you have help at home or someone to help you manage your care at home? Yes   Who are your caregiver(s) and their phone number(s)? dgtr sandra alamo   Prior to hospitilization cognitive status: Unable to Assess   Current cognitive status: Alert/Oriented   Equipment Currently Used at Home none   Readmission within 30 days? No   Patient currently being followed by outpatient case management? No   Do you currently have service(s) that help you manage your care at home? No   Do you take prescription medications? Yes   Do you have prescription coverage? Yes   Coverage medicaid   Do you have any problems affording any of your prescribed medications? No   Who is going to help you get home at discharge? dgtr   How do you get to doctors appointments? car, drives self;family or friend will provide   Are you on dialysis? No   Discharge Plan A Home with family   Discharge Plan B Home with family   DME Needed Upon Discharge  none   Discharge Plan discussed with: Patient   Discharge Barriers Identified None   Financial Resource Strain   How hard is it for you to pay for the very basics like food, housing, medical care, and heating? Not hard   Housing Stability   In the last 12 months, was there a time when you did not have a steady place to sleep or slept in a shelter (including now)? N   Transportation Needs   In the past 12 months, has lack of transportation kept you from medical appointments or from getting medications? no   In the past 12 months, has lack of  transportation kept you from meetings, work, or from getting things needed for daily living? No   OTHER   Name(s) of People in Home dgtr sandra alamo     Disch home today with dgtr.  Sandra alamo, pt in [facility], drives and is employed at [employer].

## 2023-03-29 ENCOUNTER — OFFICE VISIT (OUTPATIENT)
Dept: RHEUMATOLOGY | Facility: CLINIC | Age: 38
End: 2023-03-29
Payer: MEDICAID

## 2023-03-29 VITALS
DIASTOLIC BLOOD PRESSURE: 77 MMHG | HEART RATE: 84 BPM | RESPIRATION RATE: 18 BRPM | TEMPERATURE: 99 F | HEIGHT: 66 IN | OXYGEN SATURATION: 97 % | SYSTOLIC BLOOD PRESSURE: 115 MMHG | BODY MASS INDEX: 32.47 KG/M2 | WEIGHT: 202 LBS

## 2023-03-29 DIAGNOSIS — M51.37 DDD (DEGENERATIVE DISC DISEASE), LUMBOSACRAL: ICD-10-CM

## 2023-03-29 DIAGNOSIS — M32.9 SYSTEMIC LUPUS ERYTHEMATOSUS, UNSPECIFIED SLE TYPE, UNSPECIFIED ORGAN INVOLVEMENT STATUS: Primary | ICD-10-CM

## 2023-03-29 DIAGNOSIS — L93.0 DISCOID LUPUS ERYTHEMATOSUS: ICD-10-CM

## 2023-03-29 DIAGNOSIS — F41.0 PANIC ATTACK: ICD-10-CM

## 2023-03-29 DIAGNOSIS — F41.9 ANXIETY: ICD-10-CM

## 2023-03-29 DIAGNOSIS — M50.30 DEGENERATIVE CERVICAL DISC: ICD-10-CM

## 2023-03-29 PROCEDURE — 1159F MED LIST DOCD IN RCRD: CPT | Mod: CPTII,,, | Performed by: INTERNAL MEDICINE

## 2023-03-29 PROCEDURE — 99999 PR PBB SHADOW E&M-EST. PATIENT-LVL IV: ICD-10-PCS | Mod: PBBFAC,,, | Performed by: INTERNAL MEDICINE

## 2023-03-29 PROCEDURE — 3074F PR MOST RECENT SYSTOLIC BLOOD PRESSURE < 130 MM HG: ICD-10-PCS | Mod: CPTII,,, | Performed by: INTERNAL MEDICINE

## 2023-03-29 PROCEDURE — 99214 PR OFFICE/OUTPT VISIT, EST, LEVL IV, 30-39 MIN: ICD-10-PCS | Mod: S$PBB,,, | Performed by: INTERNAL MEDICINE

## 2023-03-29 PROCEDURE — 3074F SYST BP LT 130 MM HG: CPT | Mod: CPTII,,, | Performed by: INTERNAL MEDICINE

## 2023-03-29 PROCEDURE — 99214 OFFICE O/P EST MOD 30 MIN: CPT | Mod: PBBFAC | Performed by: INTERNAL MEDICINE

## 2023-03-29 PROCEDURE — 3078F DIAST BP <80 MM HG: CPT | Mod: CPTII,,, | Performed by: INTERNAL MEDICINE

## 2023-03-29 PROCEDURE — 1159F PR MEDICATION LIST DOCUMENTED IN MEDICAL RECORD: ICD-10-PCS | Mod: CPTII,,, | Performed by: INTERNAL MEDICINE

## 2023-03-29 PROCEDURE — 3078F PR MOST RECENT DIASTOLIC BLOOD PRESSURE < 80 MM HG: ICD-10-PCS | Mod: CPTII,,, | Performed by: INTERNAL MEDICINE

## 2023-03-29 PROCEDURE — 3008F BODY MASS INDEX DOCD: CPT | Mod: CPTII,,, | Performed by: INTERNAL MEDICINE

## 2023-03-29 PROCEDURE — 3008F PR BODY MASS INDEX (BMI) DOCUMENTED: ICD-10-PCS | Mod: CPTII,,, | Performed by: INTERNAL MEDICINE

## 2023-03-29 PROCEDURE — 99999 PR PBB SHADOW E&M-EST. PATIENT-LVL IV: CPT | Mod: PBBFAC,,, | Performed by: INTERNAL MEDICINE

## 2023-03-29 PROCEDURE — 99214 OFFICE O/P EST MOD 30 MIN: CPT | Mod: S$PBB,,, | Performed by: INTERNAL MEDICINE

## 2023-03-29 RX ORDER — ASPIRIN 325 MG
50000 TABLET, DELAYED RELEASE (ENTERIC COATED) ORAL WEEKLY
Qty: 5 CAPSULE | Refills: 5 | Status: ON HOLD | OUTPATIENT
Start: 2023-03-29 | End: 2023-04-29 | Stop reason: HOSPADM

## 2023-03-29 NOTE — PROGRESS NOTES
"Subjective:       Patient ID: Mary Fernandez is a 38 y.o. female.    Chief Complaint: Disease Management (4- month follow-up/Patient feels unwell, severe fatigue for the last month )    The patient is complaining of joint pain involving the MCP PIP wrist elbow shoulders hips knees and ankles bilaterally.  The pain is 8/10 in intensity dull in quality and continuous.  That is associated with a morning stiffness lasting for more than 60 minutes.  Is also having difficulty maintaining a good night of sleep.  This has been associated with myalgias.  Muscle aches are 8/10 in intensity dull in quality and continuous.  They are associated with fatigue.  No fever no chills no others.  The patient is not compliant with the  instructions not to stay in the sun.  She was in the sun yesterday without any sunscreen, without any had.  Currently she is in a lupus flare up.  Detailed counseling provided about that.  SYSTEMIC LUPUS RESISTANT TO CELLCEPT METHOTREXATE PLAQUENIL STEROIDS AND NSAIDS    Review of Systems   Constitutional:  Negative for appetite change, chills and fever.   HENT:  Negative for congestion, ear pain, mouth sores, nosebleeds and trouble swallowing.    Eyes:  Negative for photophobia and discharge.   Respiratory:  Negative for chest tightness and shortness of breath.    Cardiovascular:  Negative for chest pain.   Gastrointestinal:  Negative for abdominal pain and vomiting.   Endocrine: Negative.    Genitourinary:  Negative for hematuria.   Musculoskeletal:         As per HPI   Skin:  Positive for rash.        Malar rash   Neurological:  Negative for weakness.       Objective:   /77 (BP Location: Left arm, Patient Position: Sitting, BP Method: Medium (Automatic))   Pulse 84   Temp 98.8 °F (37.1 °C) (Oral)   Resp 18   Ht 5' 6" (1.676 m)   Wt 91.6 kg (202 lb)   SpO2 97%   BMI 32.60 kg/m²      Physical Exam   Constitutional: She is oriented to person, place, and time. She appears well-developed and " well-nourished. No distress.   HENT:   Head: Normocephalic and atraumatic.   Right Ear: External ear normal.   Left Ear: External ear normal.   Eyes: Pupils are equal, round, and reactive to light.   Cardiovascular: Normal rate, regular rhythm and normal heart sounds.   Pulmonary/Chest: Breath sounds normal.   Abdominal: Soft. There is no abdominal tenderness.   Musculoskeletal:      Right shoulder: Tenderness present.      Left shoulder: Tenderness present.      Right elbow: Tenderness present.      Left elbow: Tenderness present.      Right wrist: Tenderness present.      Left wrist: Tenderness present.      Cervical back: Neck supple.      Right hip: Tenderness present.      Left hip: Tenderness present.      Right knee: Tenderness present.      Left knee: Tenderness present.      Right ankle: Tenderness present.      Left ankle: Tenderness present.   Lymphadenopathy:     She has no cervical adenopathy.   Neurological: She is alert and oriented to person, place, and time. She displays normal reflexes. No cranial nerve deficit or sensory deficit. She exhibits normal muscle tone. Coordination normal.   Skin: Rash noted. There is erythema.   Malar rash   Vitals reviewed.      Right Side Rheumatological Exam     The patient is tender to palpation of the shoulder, elbow, wrist, knee, 1st PIP, 1st MCP, 2nd PIP, 2nd MCP, 3rd PIP, 3rd MCP, 4th PIP, 4th MCP, 5th PIP, hip, ankle, 1st MTP, 2nd MTP, 3rd MTP, 4th MTP, 5th MTP, 1st toe IP, 2nd toe IP, 3rd toe IP, 4th toe IP and 5th toe IP    Left Side Rheumatological Exam     The patient is tender to palpation of the shoulder, elbow, wrist, knee, 1st PIP, 1st MCP, 2nd PIP, 2nd MCP, 3rd PIP, 3rd MCP, 4th PIP, 4th MCP, 5th PIP, 5th MCP, hip, ankle, 1st MTP, 2nd MTP, 3rd MTP, 4th MTP, 5th MTP, 1st toe IP, 2nd toe IP, 3rd toe IP, 4th toe IP and 5th toe IP.       Completed Fibromyalgia exam 18/18 tender points.  No data to display     Assessment:       1. Systemic lupus  erythematosus, unspecified SLE type, unspecified organ involvement status    2. Discoid lupus erythematosus    3. Panic attack    4. Anxiety    5. Degenerative cervical disc    6. DDD (degenerative disc disease), lumbosacral            Medication List with Changes/Refills   New Medications    BELIMUMAB (BENLYSTA) 200 MG/ML ATIN    Inject 1 mL (200 mg total) into the skin every 7 days.       Start Date: 3/29/2023 End Date: --    CHOLECALCIFEROL, VITAMIN D3, 1,250 MCG (50,000 UNIT) CAPSULE    Take 1 capsule (50,000 Units total) by mouth once a week.       Start Date: 3/29/2023 End Date: --   Current Medications    ALBUTEROL (PROVENTIL/VENTOLIN HFA) 90 MCG/ACTUATION INHALER    Take 2 puffs by mouth every 6 (six) hours as needed.       Start Date: 4/2/2019  End Date: --    CLONAZEPAM (KLONOPIN) 1 MG TABLET    Take 1 tablet (1 mg total) by mouth 3 (three) times daily as needed for Anxiety.       Start Date: 11/18/2022End Date: --    DEXTROAMPHETAMINE-AMPHETAMINE 30 MG TAB    Take 1 tablet by mouth 2 (two) times daily.       Start Date: 6/15/2022 End Date: --    DUPILUMAB (DUPIXENT) 300 MG/2 ML SYRG    Inject 300 mg into the skin. Every other week       Start Date: 2/18/2019 End Date: --    ESCITALOPRAM OXALATE (LEXAPRO) 20 MG TABLET    Take 1 tablet (20 mg total) by mouth once daily.       Start Date: 1/5/2023  End Date: --    FLUTICASONE PROPIONATE (FLONASE) 50 MCG/ACTUATION NASAL SPRAY    1 spray by Each Nare route once daily.       Start Date: --        End Date: --    HYDROCODONE-ACETAMINOPHEN (NORCO)  MG PER TABLET    Take 1 tablet by mouth 3 (three) times daily as needed for Pain.       Start Date: 2/27/2023 End Date: 3/29/2023    HYDROXYCHLOROQUINE (PLAQUENIL) 200 MG TABLET    Take 1 tablet (200 mg total) by mouth 2 (two) times daily.       Start Date: 1/5/2023  End Date: --    LEVOCETIRIZINE (XYZAL) 5 MG TABLET    Take 1 tablet (5 mg total) by mouth every evening.       Start Date: 1/5/2023  End Date:  1/5/2024    MYCOPHENOLATE (CELLCEPT) 500 MG TAB    Take 2 tablets (1,000 mg total) by mouth 2 (two) times daily.       Start Date: 1/5/2023  End Date: 1/5/2024    ONDANSETRON (ZOFRAN) 4 MG TABLET    Take 1 tablet (4 mg total) by mouth every 6 (six) hours.       Start Date: 3/14/2023 End Date: --    PREDNISONE (DELTASONE) 5 MG TABLET    Take 1 tablet (5 mg total) by mouth daily as needed (flare ups). AFTER BREAKFAST       Start Date: 1/5/2023  End Date: --    PREGABALIN (LYRICA) 25 MG CAPSULE    Take 1 capsule (25 mg total) by mouth 2 (two) times daily.       Start Date: 1/5/2023  End Date: 7/6/2023    TIZANIDINE (ZANAFLEX) 2 MG TABLET    Take 1 tablet (2 mg total) by mouth nightly.       Start Date: 1/5/2023  End Date: --         Plan:         Problem List Items Addressed This Visit          Neuro    DDD (degenerative disc disease), lumbosacral    Relevant Medications    belimumab (BENLYSTA) 200 mg/mL AtIn    cholecalciferol, vitamin D3, 1,250 mcg (50,000 unit) capsule    Other Relevant Orders    Quantiferon Gold TB    CBC Auto Differential    Comprehensive Metabolic Panel    C-Reactive Protein    DSDNA    C4 Complement    C3 Complement    Urinalysis    Protein/Creatinine Ratio, Urine    CBC Auto Differential    Comprehensive Metabolic Panel    CRP, High Sensitivity    Vitamin D    Antinuclear Ab, HEp-2 Substrate    Hepatitis B Surface Antigen    Hepatitis C Antibody    TSH    T4, Free    ANTINUCLEAR ANTIBODIES COMPREHENSIVE PANEL    HIV 1/2 Ag/Ab (4th Gen)    Degenerative cervical disc    Relevant Medications    belimumab (BENLYSTA) 200 mg/mL AtIn    cholecalciferol, vitamin D3, 1,250 mcg (50,000 unit) capsule    Other Relevant Orders    Quantiferon Gold TB    CBC Auto Differential    Comprehensive Metabolic Panel    C-Reactive Protein    DSDNA    C4 Complement    C3 Complement    Urinalysis    Protein/Creatinine Ratio, Urine    CBC Auto Differential    Comprehensive Metabolic Panel    CRP, High Sensitivity     Vitamin D    Antinuclear Ab, HEp-2 Substrate    Hepatitis B Surface Antigen    Hepatitis C Antibody    TSH    T4, Free    ANTINUCLEAR ANTIBODIES COMPREHENSIVE PANEL    HIV 1/2 Ag/Ab (4th Gen)       Psychiatric    Anxiety    Relevant Medications    belimumab (BENLYSTA) 200 mg/mL AtIn    cholecalciferol, vitamin D3, 1,250 mcg (50,000 unit) capsule    Other Relevant Orders    Quantiferon Gold TB    CBC Auto Differential    Comprehensive Metabolic Panel    C-Reactive Protein    DSDNA    C4 Complement    C3 Complement    Urinalysis    Protein/Creatinine Ratio, Urine    CBC Auto Differential    Comprehensive Metabolic Panel    CRP, High Sensitivity    Vitamin D    Antinuclear Ab, HEp-2 Substrate    Hepatitis B Surface Antigen    Hepatitis C Antibody    TSH    T4, Free    ANTINUCLEAR ANTIBODIES COMPREHENSIVE PANEL    HIV 1/2 Ag/Ab (4th Gen)    Panic attack    Relevant Medications    belimumab (BENLYSTA) 200 mg/mL AtIn    cholecalciferol, vitamin D3, 1,250 mcg (50,000 unit) capsule    Other Relevant Orders    Quantiferon Gold TB    CBC Auto Differential    Comprehensive Metabolic Panel    C-Reactive Protein    DSDNA    C4 Complement    C3 Complement    Urinalysis    Protein/Creatinine Ratio, Urine    CBC Auto Differential    Comprehensive Metabolic Panel    CRP, High Sensitivity    Vitamin D    Antinuclear Ab, HEp-2 Substrate    Hepatitis B Surface Antigen    Hepatitis C Antibody    TSH    T4, Free    ANTINUCLEAR ANTIBODIES COMPREHENSIVE PANEL    HIV 1/2 Ag/Ab (4th Gen)       Derm    Discoid lupus erythematosus    Relevant Medications    belimumab (BENLYSTA) 200 mg/mL AtIn    cholecalciferol, vitamin D3, 1,250 mcg (50,000 unit) capsule    Other Relevant Orders    Quantiferon Gold TB    CBC Auto Differential    Comprehensive Metabolic Panel    C-Reactive Protein    DSDNA    C4 Complement    C3 Complement    Urinalysis    Protein/Creatinine Ratio, Urine    CBC Auto Differential    Comprehensive Metabolic Panel    CRP, High  Sensitivity    Vitamin D    Antinuclear Ab, HEp-2 Substrate    Hepatitis B Surface Antigen    Hepatitis C Antibody    TSH    T4, Free    ANTINUCLEAR ANTIBODIES COMPREHENSIVE PANEL    HIV 1/2 Ag/Ab (4th Gen)       Immunology/Multi System    Systemic lupus erythematosus - Primary    Relevant Medications    belimumab (BENLYSTA) 200 mg/mL AtIn    cholecalciferol, vitamin D3, 1,250 mcg (50,000 unit) capsule    Other Relevant Orders    Quantiferon Gold TB    CBC Auto Differential    Comprehensive Metabolic Panel    C-Reactive Protein    DSDNA    C4 Complement    C3 Complement    Urinalysis    Protein/Creatinine Ratio, Urine    CBC Auto Differential    Comprehensive Metabolic Panel    CRP, High Sensitivity    Vitamin D    Antinuclear Ab, HEp-2 Substrate    Hepatitis B Surface Antigen    Hepatitis C Antibody    TSH    T4, Free    ANTINUCLEAR ANTIBODIES COMPREHENSIVE PANEL    HIV 1/2 Ag/Ab (4th Gen)

## 2023-03-30 DIAGNOSIS — M79.7 FIBROMYALGIA: ICD-10-CM

## 2023-03-30 DIAGNOSIS — M32.9 SYSTEMIC LUPUS ERYTHEMATOSUS, UNSPECIFIED SLE TYPE, UNSPECIFIED ORGAN INVOLVEMENT STATUS: ICD-10-CM

## 2023-03-30 DIAGNOSIS — M50.30 DEGENERATIVE CERVICAL DISC: ICD-10-CM

## 2023-03-30 DIAGNOSIS — L93.0 DISCOID LUPUS ERYTHEMATOSUS: ICD-10-CM

## 2023-03-30 DIAGNOSIS — K21.9 GASTROESOPHAGEAL REFLUX DISEASE, UNSPECIFIED WHETHER ESOPHAGITIS PRESENT: ICD-10-CM

## 2023-03-30 DIAGNOSIS — F51.01 PRIMARY INSOMNIA: ICD-10-CM

## 2023-03-30 DIAGNOSIS — D33.2 BENIGN NEOPLASM OF BRAIN, UNSPECIFIED BRAIN REGION: ICD-10-CM

## 2023-03-30 DIAGNOSIS — M51.37 DDD (DEGENERATIVE DISC DISEASE), LUMBOSACRAL: ICD-10-CM

## 2023-03-30 DIAGNOSIS — M35.9 UNDIFFERENTIATED CONNECTIVE TISSUE DISEASE: ICD-10-CM

## 2023-03-30 RX ORDER — HYDROCODONE BITARTRATE AND ACETAMINOPHEN 10; 325 MG/1; MG/1
1 TABLET ORAL 3 TIMES DAILY PRN
Qty: 90 TABLET | Refills: 0 | Status: SHIPPED | OUTPATIENT
Start: 2023-03-30 | End: 2023-04-24 | Stop reason: SDUPTHER

## 2023-03-30 RX ORDER — HYDROCODONE BITARTRATE AND ACETAMINOPHEN 10; 325 MG/1; MG/1
1 TABLET ORAL 3 TIMES DAILY PRN
Qty: 90 TABLET | Refills: 0 | OUTPATIENT
Start: 2023-03-30 | End: 2023-04-29

## 2023-04-24 DIAGNOSIS — M35.9 UNDIFFERENTIATED CONNECTIVE TISSUE DISEASE: ICD-10-CM

## 2023-04-24 DIAGNOSIS — L93.0 DISCOID LUPUS ERYTHEMATOSUS: ICD-10-CM

## 2023-04-24 DIAGNOSIS — F51.01 PRIMARY INSOMNIA: ICD-10-CM

## 2023-04-24 DIAGNOSIS — M32.9 SYSTEMIC LUPUS ERYTHEMATOSUS, UNSPECIFIED SLE TYPE, UNSPECIFIED ORGAN INVOLVEMENT STATUS: ICD-10-CM

## 2023-04-24 DIAGNOSIS — M51.37 DDD (DEGENERATIVE DISC DISEASE), LUMBOSACRAL: ICD-10-CM

## 2023-04-24 DIAGNOSIS — D33.2 BENIGN NEOPLASM OF BRAIN, UNSPECIFIED BRAIN REGION: ICD-10-CM

## 2023-04-24 DIAGNOSIS — K21.9 GASTROESOPHAGEAL REFLUX DISEASE, UNSPECIFIED WHETHER ESOPHAGITIS PRESENT: ICD-10-CM

## 2023-04-24 DIAGNOSIS — M79.7 FIBROMYALGIA: ICD-10-CM

## 2023-04-24 DIAGNOSIS — M50.30 DEGENERATIVE CERVICAL DISC: ICD-10-CM

## 2023-04-25 RX ORDER — HYDROCODONE BITARTRATE AND ACETAMINOPHEN 10; 325 MG/1; MG/1
1 TABLET ORAL 3 TIMES DAILY PRN
Qty: 90 TABLET | Refills: 0 | Status: SHIPPED | OUTPATIENT
Start: 2023-04-25 | End: 2023-05-24 | Stop reason: SDUPTHER

## 2023-04-28 ENCOUNTER — HOSPITAL ENCOUNTER (OUTPATIENT)
Facility: HOSPITAL | Age: 38
Discharge: HOME | End: 2023-04-29
Attending: EMERGENCY MEDICINE | Admitting: INTERNAL MEDICINE
Payer: MEDICAID

## 2023-04-28 DIAGNOSIS — F41.9 ANXIETY: ICD-10-CM

## 2023-04-28 DIAGNOSIS — R94.31 T WAVE INVERSION IN EKG: ICD-10-CM

## 2023-04-28 DIAGNOSIS — Z82.49 FAMILY HISTORY OF CORONARY ARTERY DISEASE: ICD-10-CM

## 2023-04-28 DIAGNOSIS — R07.9 CHEST PAIN: ICD-10-CM

## 2023-04-28 DIAGNOSIS — R07.9 ACUTE CHEST PAIN: Primary | ICD-10-CM

## 2023-04-28 LAB
ALBUMIN SERPL-MCNC: 4.2 G/DL (ref 3.5–5)
ALBUMIN/GLOB SERPL: 1.3 RATIO (ref 1.1–2)
ALP SERPL-CCNC: 65 UNIT/L (ref 40–150)
ALT SERPL-CCNC: 19 UNIT/L (ref 0–55)
AST SERPL-CCNC: 16 UNIT/L (ref 5–34)
BASOPHILS # BLD AUTO: 0.03 X10(3)/MCL (ref 0–0.2)
BASOPHILS NFR BLD AUTO: 0.3 %
BILIRUBIN DIRECT+TOT PNL SERPL-MCNC: 0.3 MG/DL
BNP BLD-MCNC: <10 PG/ML
BUN SERPL-MCNC: 13 MG/DL (ref 7–18.7)
CALCIUM SERPL-MCNC: 9.3 MG/DL (ref 8.4–10.2)
CHLORIDE SERPL-SCNC: 105 MMOL/L (ref 98–107)
CO2 SERPL-SCNC: 26 MMOL/L (ref 22–29)
CREAT SERPL-MCNC: 0.84 MG/DL (ref 0.55–1.02)
EOSINOPHIL # BLD AUTO: 0.11 X10(3)/MCL (ref 0–0.9)
EOSINOPHIL NFR BLD AUTO: 1.3 %
ERYTHROCYTE [DISTWIDTH] IN BLOOD BY AUTOMATED COUNT: 12 % (ref 11.5–17)
GFR SERPLBLD CREATININE-BSD FMLA CKD-EPI: >60 MLS/MIN/1.73/M2
GLOBULIN SER-MCNC: 3.3 GM/DL (ref 2.4–3.5)
GLUCOSE SERPL-MCNC: 94 MG/DL (ref 74–100)
HCT VFR BLD AUTO: 38.5 % (ref 37–47)
HGB BLD-MCNC: 13 G/DL (ref 12–16)
IMM GRANULOCYTES # BLD AUTO: 0.03 X10(3)/MCL (ref 0–0.04)
IMM GRANULOCYTES NFR BLD AUTO: 0.3 %
INR BLD: 0.93 (ref 0–1.3)
LIPASE SERPL-CCNC: 26 U/L
LYMPHOCYTES # BLD AUTO: 2.28 X10(3)/MCL (ref 0.6–4.6)
LYMPHOCYTES NFR BLD AUTO: 26.2 %
MCH RBC QN AUTO: 29.7 PG (ref 27–31)
MCHC RBC AUTO-ENTMCNC: 33.8 G/DL (ref 33–36)
MCV RBC AUTO: 88.1 FL (ref 80–94)
MONOCYTES # BLD AUTO: 0.75 X10(3)/MCL (ref 0.1–1.3)
MONOCYTES NFR BLD AUTO: 8.6 %
NEUTROPHILS # BLD AUTO: 5.51 X10(3)/MCL (ref 2.1–9.2)
NEUTROPHILS NFR BLD AUTO: 63.3 %
NRBC BLD AUTO-RTO: 0 %
PLATELET # BLD AUTO: 221 X10(3)/MCL (ref 130–400)
PMV BLD AUTO: 10.5 FL (ref 7.4–10.4)
POTASSIUM SERPL-SCNC: 3.5 MMOL/L (ref 3.5–5.1)
PROT SERPL-MCNC: 7.5 GM/DL (ref 6.4–8.3)
PROTHROMBIN TIME: 12.4 SECONDS (ref 12.5–14.5)
RBC # BLD AUTO: 4.37 X10(6)/MCL (ref 4.2–5.4)
SODIUM SERPL-SCNC: 139 MMOL/L (ref 136–145)
TROPONIN I SERPL-MCNC: <0.01 NG/ML (ref 0–0.04)
WBC # SPEC AUTO: 8.7 X10(3)/MCL (ref 4.5–11.5)

## 2023-04-28 PROCEDURE — G0378 HOSPITAL OBSERVATION PER HR: HCPCS

## 2023-04-28 PROCEDURE — 84484 ASSAY OF TROPONIN QUANT: CPT | Mod: 91 | Performed by: NURSE PRACTITIONER

## 2023-04-28 PROCEDURE — 25000003 PHARM REV CODE 250: Performed by: INTERNAL MEDICINE

## 2023-04-28 PROCEDURE — 25000003 PHARM REV CODE 250: Performed by: EMERGENCY MEDICINE

## 2023-04-28 PROCEDURE — 85610 PROTHROMBIN TIME: CPT | Performed by: EMERGENCY MEDICINE

## 2023-04-28 PROCEDURE — 83690 ASSAY OF LIPASE: CPT | Performed by: EMERGENCY MEDICINE

## 2023-04-28 PROCEDURE — 80053 COMPREHEN METABOLIC PANEL: CPT | Performed by: EMERGENCY MEDICINE

## 2023-04-28 PROCEDURE — 93005 ELECTROCARDIOGRAM TRACING: CPT

## 2023-04-28 PROCEDURE — 96372 THER/PROPH/DIAG INJ SC/IM: CPT | Mod: 59 | Performed by: NURSE PRACTITIONER

## 2023-04-28 PROCEDURE — 93010 ELECTROCARDIOGRAM REPORT: CPT | Mod: ,,, | Performed by: INTERNAL MEDICINE

## 2023-04-28 PROCEDURE — 83880 ASSAY OF NATRIURETIC PEPTIDE: CPT | Performed by: EMERGENCY MEDICINE

## 2023-04-28 PROCEDURE — 99285 EMERGENCY DEPT VISIT HI MDM: CPT | Mod: 25

## 2023-04-28 PROCEDURE — 85025 COMPLETE CBC W/AUTO DIFF WBC: CPT | Performed by: EMERGENCY MEDICINE

## 2023-04-28 PROCEDURE — 96374 THER/PROPH/DIAG INJ IV PUSH: CPT

## 2023-04-28 PROCEDURE — 63600175 PHARM REV CODE 636 W HCPCS: Performed by: NURSE PRACTITIONER

## 2023-04-28 PROCEDURE — 25000003 PHARM REV CODE 250: Performed by: NURSE PRACTITIONER

## 2023-04-28 PROCEDURE — 84484 ASSAY OF TROPONIN QUANT: CPT | Performed by: EMERGENCY MEDICINE

## 2023-04-28 PROCEDURE — 84484 ASSAY OF TROPONIN QUANT: CPT | Mod: 91 | Performed by: INTERNAL MEDICINE

## 2023-04-28 PROCEDURE — 93010 EKG 12-LEAD: ICD-10-PCS | Mod: ,,, | Performed by: INTERNAL MEDICINE

## 2023-04-28 RX ORDER — PROCHLORPERAZINE EDISYLATE 5 MG/ML
5 INJECTION INTRAMUSCULAR; INTRAVENOUS EVERY 6 HOURS PRN
Status: DISCONTINUED | OUTPATIENT
Start: 2023-04-28 | End: 2023-04-29 | Stop reason: HOSPADM

## 2023-04-28 RX ORDER — MYCOPHENOLATE MOFETIL 250 MG/1
1000 CAPSULE ORAL 2 TIMES DAILY
Status: DISCONTINUED | OUTPATIENT
Start: 2023-04-28 | End: 2023-04-29 | Stop reason: HOSPADM

## 2023-04-28 RX ORDER — LIDOCAINE HYDROCHLORIDE 20 MG/ML
15 SOLUTION OROPHARYNGEAL ONCE
Status: COMPLETED | OUTPATIENT
Start: 2023-04-28 | End: 2023-04-28

## 2023-04-28 RX ORDER — ESCITALOPRAM OXALATE 10 MG/1
20 TABLET ORAL DAILY
Status: DISCONTINUED | OUTPATIENT
Start: 2023-04-28 | End: 2023-04-29 | Stop reason: HOSPADM

## 2023-04-28 RX ORDER — ONDANSETRON 2 MG/ML
4 INJECTION INTRAMUSCULAR; INTRAVENOUS EVERY 4 HOURS PRN
Status: DISCONTINUED | OUTPATIENT
Start: 2023-04-28 | End: 2023-04-29 | Stop reason: HOSPADM

## 2023-04-28 RX ORDER — PREGABALIN 25 MG/1
25 CAPSULE ORAL 2 TIMES DAILY
Status: DISCONTINUED | OUTPATIENT
Start: 2023-04-28 | End: 2023-04-29 | Stop reason: HOSPADM

## 2023-04-28 RX ORDER — LORAZEPAM 1 MG/1
1 TABLET ORAL
Status: COMPLETED | OUTPATIENT
Start: 2023-04-28 | End: 2023-04-28

## 2023-04-28 RX ORDER — PANTOPRAZOLE SODIUM 40 MG/1
40 TABLET, DELAYED RELEASE ORAL DAILY
Status: DISCONTINUED | OUTPATIENT
Start: 2023-04-28 | End: 2023-04-29 | Stop reason: HOSPADM

## 2023-04-28 RX ORDER — POLYETHYLENE GLYCOL 3350 17 G/17G
17 POWDER, FOR SOLUTION ORAL 2 TIMES DAILY PRN
Status: DISCONTINUED | OUTPATIENT
Start: 2023-04-28 | End: 2023-04-29 | Stop reason: HOSPADM

## 2023-04-28 RX ORDER — MAG HYDROX/ALUMINUM HYD/SIMETH 200-200-20
30 SUSPENSION, ORAL (FINAL DOSE FORM) ORAL ONCE
Status: COMPLETED | OUTPATIENT
Start: 2023-04-28 | End: 2023-04-28

## 2023-04-28 RX ORDER — TALC
6 POWDER (GRAM) TOPICAL NIGHTLY PRN
Status: DISCONTINUED | OUTPATIENT
Start: 2023-04-28 | End: 2023-04-29 | Stop reason: HOSPADM

## 2023-04-28 RX ORDER — TIZANIDINE 2 MG/1
2 TABLET ORAL NIGHTLY
Status: DISCONTINUED | OUTPATIENT
Start: 2023-04-28 | End: 2023-04-29 | Stop reason: HOSPADM

## 2023-04-28 RX ORDER — NALOXONE HCL 0.4 MG/ML
0.02 VIAL (ML) INJECTION
Status: DISCONTINUED | OUTPATIENT
Start: 2023-04-28 | End: 2023-04-29 | Stop reason: HOSPADM

## 2023-04-28 RX ORDER — LORAZEPAM 1 MG/1
2 TABLET ORAL
Status: ACTIVE | OUTPATIENT
Start: 2023-04-28 | End: 2023-04-28

## 2023-04-28 RX ORDER — ACETAMINOPHEN 325 MG/1
650 TABLET ORAL EVERY 6 HOURS PRN
Status: DISCONTINUED | OUTPATIENT
Start: 2023-04-28 | End: 2023-04-29 | Stop reason: HOSPADM

## 2023-04-28 RX ORDER — CLONAZEPAM 1 MG/1
1 TABLET ORAL 3 TIMES DAILY PRN
Status: DISCONTINUED | OUTPATIENT
Start: 2023-04-28 | End: 2023-04-29 | Stop reason: HOSPADM

## 2023-04-28 RX ORDER — SIMETHICONE 80 MG
1 TABLET,CHEWABLE ORAL 4 TIMES DAILY PRN
Status: DISCONTINUED | OUTPATIENT
Start: 2023-04-28 | End: 2023-04-29 | Stop reason: HOSPADM

## 2023-04-28 RX ORDER — HYDROCODONE BITARTRATE AND ACETAMINOPHEN 10; 325 MG/1; MG/1
1 TABLET ORAL 3 TIMES DAILY PRN
Status: DISCONTINUED | OUTPATIENT
Start: 2023-04-28 | End: 2023-04-29 | Stop reason: HOSPADM

## 2023-04-28 RX ORDER — ASPIRIN 325 MG
50000 TABLET, DELAYED RELEASE (ENTERIC COATED) ORAL WEEKLY
Status: DISCONTINUED | OUTPATIENT
Start: 2023-04-28 | End: 2023-04-28

## 2023-04-28 RX ORDER — HYDROXYCHLOROQUINE SULFATE 200 MG/1
200 TABLET, FILM COATED ORAL 2 TIMES DAILY
Status: DISCONTINUED | OUTPATIENT
Start: 2023-04-28 | End: 2023-04-29 | Stop reason: HOSPADM

## 2023-04-28 RX ORDER — FLUTICASONE PROPIONATE 50 MCG
1 SPRAY, SUSPENSION (ML) NASAL DAILY
Status: DISCONTINUED | OUTPATIENT
Start: 2023-04-28 | End: 2023-04-29 | Stop reason: HOSPADM

## 2023-04-28 RX ORDER — ENOXAPARIN SODIUM 100 MG/ML
40 INJECTION SUBCUTANEOUS EVERY 24 HOURS
Status: DISCONTINUED | OUTPATIENT
Start: 2023-04-28 | End: 2023-04-29 | Stop reason: HOSPADM

## 2023-04-28 RX ORDER — PREDNISONE 5 MG/1
5 TABLET ORAL DAILY PRN
Status: DISCONTINUED | OUTPATIENT
Start: 2023-04-28 | End: 2023-04-29 | Stop reason: HOSPADM

## 2023-04-28 RX ORDER — METOPROLOL SUCCINATE 25 MG/1
25 TABLET, EXTENDED RELEASE ORAL DAILY
Status: DISCONTINUED | OUTPATIENT
Start: 2023-04-28 | End: 2023-04-29 | Stop reason: HOSPADM

## 2023-04-28 RX ORDER — ERGOCALCIFEROL 1.25 MG/1
50000 CAPSULE ORAL
Status: DISCONTINUED | OUTPATIENT
Start: 2023-04-28 | End: 2023-04-29 | Stop reason: HOSPADM

## 2023-04-28 RX ADMIN — ENOXAPARIN SODIUM 40 MG: 40 INJECTION SUBCUTANEOUS at 05:04

## 2023-04-28 RX ADMIN — PANTOPRAZOLE SODIUM 40 MG: 40 TABLET, DELAYED RELEASE ORAL at 03:04

## 2023-04-28 RX ADMIN — ESCITALOPRAM OXALATE 20 MG: 10 TABLET ORAL at 03:04

## 2023-04-28 RX ADMIN — HYDROXYCHLOROQUINE SULFATE 200 MG: 200 TABLET ORAL at 08:04

## 2023-04-28 RX ADMIN — ONDANSETRON 4 MG: 2 INJECTION INTRAMUSCULAR; INTRAVENOUS at 05:04

## 2023-04-28 RX ADMIN — LORAZEPAM 1 MG: 1 TABLET ORAL at 05:04

## 2023-04-28 RX ADMIN — METOPROLOL SUCCINATE 25 MG: 25 TABLET, EXTENDED RELEASE ORAL at 12:04

## 2023-04-28 RX ADMIN — CLONAZEPAM 1 MG: 1 TABLET ORAL at 08:04

## 2023-04-28 RX ADMIN — LIDOCAINE HYDROCHLORIDE 15 ML: 20 SOLUTION ORAL; TOPICAL at 03:04

## 2023-04-28 RX ADMIN — LORAZEPAM 1 MG: 1 TABLET ORAL at 04:04

## 2023-04-28 RX ADMIN — ALUMINUM HYDROXIDE, MAGNESIUM HYDROXIDE, AND SIMETHICONE 30 ML: 200; 200; 20 SUSPENSION ORAL at 03:04

## 2023-04-28 NOTE — ED PROVIDER NOTES
Encounter Date: 4/28/2023    SCRIBE #1 NOTE: I, Tomás Wong, am scribing for, and in the presence of,  Eden Goldman MD. I have scribed the following portions of the note - Other sections scribed: HPI, ROS, PE.     History     Chief Complaint   Patient presents with    Chest Pain     Hx Lupus. Takes Norco typically daily. Took it as prescribed 4 times today. States was at work and was busy and took it later than normal then got home and took it before bed again then panicked thinking she took it too close together. Then developed chest pain. Pt thinks its all her anxiety. States wanted to just take a klonopin and go to bed but was too nervous and wanted to get checked and make sure ok. Pt denies sob, nausea, vomiting. Also ate more sugar than normal padmini     38 year old female presents to ED c/o chest pain onset earlier tonight. Pt states that she took norco at 2030 then forgot about it and took another norco at 2330. Pt states that after she remembered that she doubled her dosage she started having feelings of anxiety and sharp chest pain. Pt states that chest pain now feels like heart burn.     The history is provided by the patient. No  was used.   Chest Pain  The current episode started 3 to 5 hours ago. Chest pain occurs intermittently. The chest pain is improving. The quality of the pain is described as sharp (now feels like heartburn). Pertinent negatives for primary symptoms include no fever, no fatigue, no shortness of breath, no abdominal pain, no nausea and no vomiting.   Review of patient's allergies indicates:   Allergen Reactions    Ibuprofen Anaphylaxis     Other reaction(s): Not Indicated    Metoclopramide Anaphylaxis and Other (See Comments)     Other reaction(s): Paralysis of vertical movement      Carisoprodol Other (See Comments)    Doxycycline      Other reaction(s): Chest pain  Other reaction(s): Chest pain      Gabapentin Other (See Comments)     Other  reaction(s): C/O - a headache      Metoclopramide hcl      Other reaction(s): Respiratory arrest    Sucralfate      Chest discomfort    Sulfamethoxazole-trimethoprim      Other reaction(s): hives  Other reaction(s): hives      Cephalexin Rash    Clindamycin Rash    Cyclobenzaprine Palpitations     Other reaction(s): SOB and increased heart rate      Morphine Itching    Penicillin v potassium Rash     Other reaction(s): Rash      Rizatriptan Nausea And Vomiting     Past Medical History:   Diagnosis Date    ADHD (attention deficit hyperactivity disorder)     Allergy     Anxiety     Atopic dermatitis 2018    Brain tumor 2008    Depression     Endometriosis     GERD (gastroesophageal reflux disease)     IC (interstitial cystitis)     Obsessive-compulsive disorder     Seizures     Systemic lupus erythematosus, organ or system involvement unspecified      Past Surgical History:   Procedure Laterality Date    AUGMENTATION OF BREAST      CERVICAL FUSION      HYSTERECTOMY      LAPAROSCOPIC DRAINAGE OF CYST OF OVARY      NASAL SEPTOPLASTY  2007     Family History   Problem Relation Age of Onset    Drug abuse Mother     Hypertension Mother     Heart disease Father     Hypertension Father     Alcohol abuse Father     Early death Father      Social History     Tobacco Use    Smoking status: Former    Smokeless tobacco: Never   Substance Use Topics    Alcohol use: Yes    Drug use: Never     Review of Systems   Constitutional:  Negative for fatigue and fever.   Eyes:  Negative for visual disturbance.   Respiratory:  Negative for chest tightness and shortness of breath.    Cardiovascular:  Positive for chest pain.   Gastrointestinal:  Negative for abdominal pain, diarrhea, nausea and vomiting.   Genitourinary:  Negative for dysuria and hematuria.   Musculoskeletal:  Negative for back pain and neck pain.   Skin:  Negative for rash.   Allergic/Immunologic: Negative for immunocompromised state.   Neurological:  Negative for headaches.    Psychiatric/Behavioral:  The patient is nervous/anxious.      Physical Exam     Initial Vitals [04/28/23 0104]   BP Pulse Resp Temp SpO2   (!) 154/83 110 17 98 °F (36.7 °C) 100 %      MAP       --         Physical Exam    Nursing note and vitals reviewed.  Constitutional: She appears well-developed and well-nourished. No distress.   Tearful, able to be coached through breathing   HENT:   Head: Normocephalic and atraumatic.   Mouth/Throat: Oropharynx is clear and moist.   Eyes: Conjunctivae are normal. Pupils are equal, round, and reactive to light.   Neck: Neck supple.   Normal range of motion.  Cardiovascular:  Normal rate, regular rhythm and normal heart sounds.           No murmur heard.  Pulmonary/Chest: Breath sounds normal. No respiratory distress.   Abdominal: Abdomen is soft. She exhibits no distension. There is no abdominal tenderness.   Musculoskeletal:         General: Normal range of motion.      Cervical back: Normal range of motion and neck supple.     Neurological: She is alert and oriented to person, place, and time. GCS score is 15. GCS eye subscore is 4. GCS verbal subscore is 5. GCS motor subscore is 6.   Skin: Skin is warm and dry. No rash noted.   Psychiatric: Her mood appears anxious.       ED Course   Procedures  Labs Reviewed   PROTIME-INR - Abnormal; Notable for the following components:       Result Value    PT 12.4 (*)     All other components within normal limits   CBC WITH DIFFERENTIAL - Abnormal; Notable for the following components:    MPV 10.5 (*)     All other components within normal limits   B-TYPE NATRIURETIC PEPTIDE - Normal   TROPONIN I - Normal   LIPASE - Normal   CBC W/ AUTO DIFFERENTIAL    Narrative:     The following orders were created for panel order CBC auto differential.  Procedure                               Abnormality         Status                     ---------                               -----------         ------                     CBC with  Differential[[id number]]        Abnormal            Final result                 Please view results for these tests on the individual orders.   COMPREHENSIVE METABOLIC PANEL     EKG Readings: (Independently Interpreted)   Previous EKG: Compared with most recent EKG Previous EKG Date: 03/03/2023 - T wave inversion new. Rhythm: Sinus Tachycardia. Heart Rate: 113. T Waves Flipped: III. Clinical Impression: Sinus Tachycardia   04/28/2023 @ 0107     Imaging Results              X-Ray Chest PA And Lateral (Final result)  Result time 04/28/23 06:32:37      Final result by Justin Burton MD (04/28/23 06:32:37)                   Impression:      No acute cardiopulmonary process.      Electronically signed by: Justin Burton  Date:    04/28/2023  Time:    06:32               Narrative:    EXAMINATION:  XR CHEST PA AND LATERAL    CLINICAL HISTORY:  Chest Pain;    TECHNIQUE:  Two views of the chest    COMPARISON:  03/03/2023    FINDINGS:  No focal opacification, pleural effusion, or pneumothorax.    The cardiomediastinal silhouette is within normal limits.    No acute osseous abnormality.                                       Medications   aluminum-magnesium hydroxide-simethicone 200-200-20 mg/5 mL suspension 30 mL (30 mLs Oral Given 4/28/23 0316)     And   LIDOcaine HCl 2% oral solution 15 mL (15 mLs Oral Given 4/28/23 0316)   LORazepam tablet 2 mg (2 mg Oral Given 4/28/23 0348)   LORazepam tablet 1 mg (1 mg Oral Given 4/28/23 0402)     Medical Decision Making  Problems Addressed:  Acute chest pain: acute illness or injury that poses a threat to life or bodily functions  Anxiety: chronic illness or injury with exacerbation, progression, or side effects of treatment  Family history of coronary artery disease: chronic illness or injury  T wave inversion in EKG: acute illness or injury      ED assessment:    Ms. Fernandez presented to the emergency department for evaluation of chest pain, anxiety which started abruptly tonight.   She does have a history of anxiety, states tonight thinks this may be worsened by the fact that she took her chronic pain medications closer together unusual which she thought might be dangerous.  Symptoms not reproducible with palpation.  Hemodynamically stable    Differential diagnosis (including but not limited to):   Acute coronary syndrome, anxiety, palpitations, electrolyte derangements, hyperventilation, chest wall pain, muscle spasm, reflux, GERD, esophagitis, PUD    ED management:   See ED course below, patient admitted for observation to trend cardiac enzymes and consider further provocative versus diagnostic testing to rule out acute coronary syndrome    My independent radiology interpretation:   Chest x-ray:  No focal infiltrate or effusion normal cardiomediastinal silhouette      Amount and/or Complexity of Data Reviewed  Independent historian: none   Summary of history:   External data reviewed: notes from previous ED visits, prior labs, and prior EKGs  Summary of data reviewed:  3 previous emergency department visits this year with chest pain complaints, 1 in the setting of an acute allergic reaction.1 with dx tachydysrhythmia.   Risk and benefits of testing: discussed   Labs: ordered and reviewed  Radiology: ordered and independent interpretation performed (see above or ED course)  ECG/medicine tests: ordered and independent interpretation performed (see above or ED course)  Discussion of management or test interpretation with external provider(s): discussed with hospitalist physician and discussed with cardiology consultant   Summary of discussion:  See ED course below    Risk  Decision regarding hospitalization  Shared decision making     Critical Care  none    I, Eden Goldman MD personally performed the history, PE, MDM, and procedures as documented above and agree with the scribe's documentation.           Scribe Attestation:   Scribe #1: I performed the above scribed service and the  documentation accurately describes the services I performed. I attest to the accuracy of the note.    Attending Attestation:           Physician Attestation for Scribe:  Physician Attestation Statement for Scribe #1: I, Eden Goldman MD, reviewed documentation, as scribed by Tomás Wong in my presence, and it is both accurate and complete.           ED Course as of 04/28/23 0404   Fri Apr 28, 2023   0319 Chest x-ray with no focal infiltrate or effusion or other acute abnormalities on my review of the image.  Final radiology interpretation pending till a.m..  EKG with T-wave inversion in lead III which is new from previous EKG from March of this year, otherwise no clinically significant acute interval changes.  Laboratory studies including initial cardiac enzymes, electrolytes, liver function testing and lipase are within normal limits.  In further discussion, she states that she does not have any chest pain any longer; however, does still feel very anxious particularly [KS]   0350 Discussed with Kenya Lombardi NP for [facility] - advised she had a nuc perfusion study 1/30, holter monitoring 1/10 with frequent sinus tachycardia w/ rare PVCs and no other arrhythmia. EKG w/  [KS]   0402 Discussed with Nohemi Whatley NP for the hospitalist service who accepts observation admission on behalf of Dr. Berry.  [KS]      ED Course User Index  [KS] Eden Goldman MD                   Clinical Impression:   Final diagnoses:  [R07.9] Chest pain  [R07.9] Acute chest pain (Primary)  [F41.9] Anxiety  [R94.31] T wave inversion in EKG  [Z82.49] Family history of coronary artery disease        ED Disposition Condition    Observation                 Eden Goldman MD  05/02/23 5071

## 2023-04-28 NOTE — H&P
[facility] Hospital Medicine History & Physical Examination       Patient Name: Mary Fernandez  MRN: [id number]  Patient Class: OP- Observation   Admission Date: 4/28/2023   Admitting Physician: Rolan Berry MD   Length of Stay: 0  Attending Physician: Ignacio Benítez MD  Primary Care Provider: Nikki Aguayo MD  Face-to-Face encounter date: 04/28/2023  Code Status: Full Code  Chief Complaint: Chest Pain (Hx Lupus. Takes Norco typically daily. Took it as prescribed 4 times today. States was at work and was busy and took it later than normal then got home and took it before bed again then panicked thinking she took it too close together. Then developed chest pain. Pt thinks its all her anxiety. States wanted to just take a klonopin and go to bed but was too nervous and wanted to get checked and make sure ok. Pt denies sob, nausea, vomiting. Also ate more sugar than normal tongiht)        Patient information was obtained from patient, patient's family, past medical records and ER records.     HISTORY OF PRESENT ILLNESS:   Mary Fernandez is a 38 y.o. White female with a past medical history of lupus on Plaquenil and CellCept, anxiety/depression, seizure disorder. The patient presented to [facility] on 4/28/2023 with a primary complaint of chest pain which began last night (04/27/2023) around 11:30 p.m..  Patient reports she was getting ready for bed when chest pain started.  She describes chest pain is located center of the chest, intermittent since onset, dull without radiation.  She currently rates chest pain as a 0/10 on exam and feels as if she was experiencing more acid reflux symptoms at this time.  She reports at the onset of chest pain she became very anxious having a panic attack.  She also complains of right lower quadrant abdominal pain.  Her last bowel movement was yesterday and has been daily.  She has a cardiologist Dr. Cisneros.  She denies complaints of cough, vomiting,  diarrhea, fever.    Upon presentation to the ED, temperature 98° F, heart rate 110, blood pressure 154/83, respiratory rate 17 and SpO2 100% on room air.  CBC and CMP within limits.  Troponin less than 0.01 x 2.  EKG sinus tachycardia ventricular rate of 113, age undetermined septal infarct, and T-wave abnormalities considering inferior ischemia.  Chest x-ray without acute cardiopulmonary process.  In ED patient received Ativan and GI cocktail.  Patient is admitted to hospital medicine services for further medical management.    PAST MEDICAL HISTORY:   Lupus on Plaquenil and CellCept   Anxiety/depression   Seizure disorder  GERD   Endometriosis   Brain tumor   ADHD  OCD    PAST SURGICAL HISTORY:     Past Surgical History:   Procedure Laterality Date    AUGMENTATION OF BREAST      CERVICAL FUSION      HYSTERECTOMY      LAPAROSCOPIC DRAINAGE OF CYST OF OVARY      NASAL SEPTOPLASTY  2007   Right thumb tendon repair    ALLERGIES:   Ibuprofen, Metoclopramide, Carisoprodol, Doxycycline, Gabapentin, Metoclopramide hcl, Sucralfate, Sulfamethoxazole-trimethoprim, Cephalexin, Clindamycin, Cyclobenzaprine, Morphine, Penicillin v potassium, and Rizatriptan    FAMILY HISTORY:   Mother:  Hypertension   Father:  Myocardial infarction     SOCIAL HISTORY:   Former smoker who quit 3-4 years ago   Denies alcohol or illicit drug use    HOME MEDICATIONS:     Prior to Admission medications    Medication Sig Start Date End Date Taking? Authorizing Provider   HYDROcodone-acetaminophen (NORCO)  mg per tablet Take 1 tablet by mouth 3 (three) times daily as needed for Pain. 4/25/23 5/25/23 Yes Enoch Carreon MD   albuterol (PROVENTIL/VENTOLIN HFA) 90 mcg/actuation inhaler Take 2 puffs by mouth every 6 (six) hours as needed. 4/2/19   Historical Provider   belimumab (BENLYSTA) 200 mg/mL AtIn Inject 1 mL (200 mg total) into the skin every 7 days. 3/29/23   Enoch Carreon MD   cholecalciferol, vitamin D3, 1,250 mcg (50,000 unit)  capsule Take 1 capsule (50,000 Units total) by mouth once a week. 3/29/23   Enoch Carreon MD   clonazePAM (KLONOPIN) 1 MG tablet Take 1 tablet (1 mg total) by mouth 3 (three) times daily as needed for Anxiety. 11/18/22   Enoch Carreon MD   dextroamphetamine-amphetamine 30 mg Tab Take 1 tablet by mouth 2 (two) times daily. 6/15/22   Historical Provider   dupilumab (DUPIXENT) 300 mg/2 mL Syrg Inject 300 mg into the skin. Every other week 2/18/19   Historical Provider   EScitalopram oxalate (LEXAPRO) 20 MG tablet Take 1 tablet (20 mg total) by mouth once daily. 1/5/23   Enoch Carreon MD   fluticasone propionate (FLONASE) 50 mcg/actuation nasal spray 1 spray by Each Nare route once daily.    Historical Provider   hydrOXYchloroQUINE (PLAQUENIL) 200 mg tablet Take 1 tablet (200 mg total) by mouth 2 (two) times daily. 1/5/23   Enoch Carreon MD   levocetirizine (XYZAL) 5 MG tablet Take 1 tablet (5 mg total) by mouth every evening. 1/5/23 1/5/24  Enoch Carreon MD   mycophenolate (CELLCEPT) 500 mg Tab Take 2 tablets (1,000 mg total) by mouth 2 (two) times daily. 1/5/23 1/5/24  Enoch Carreon MD   ondansetron (ZOFRAN) 4 MG tablet Take 1 tablet (4 mg total) by mouth every 6 (six) hours. 3/14/23   Adam B Curet IV, MD   predniSONE (DELTASONE) 5 MG tablet Take 1 tablet (5 mg total) by mouth daily as needed (flare ups). AFTER BREAKFAST 1/5/23   Enoch Carreon MD   pregabalin (LYRICA) 25 MG capsule Take 1 capsule (25 mg total) by mouth 2 (two) times daily. 1/5/23 7/6/23  Enoch Carreon MD   tiZANidine (ZANAFLEX) 2 MG tablet Take 1 tablet (2 mg total) by mouth nightly. 1/5/23   Enoch Carreon MD       REVIEW OF SYSTEMS:   Except as documented, all other systems reviewed and negative     PHYSICAL EXAM:     VITAL SIGNS: 24 HRS MIN & MAX LAST   Temp  Min: 97.6 °F (36.4 °C)  Max: 98.4 °F (36.9 °C) 97.6 °F (36.4 °C)   BP  Min: 136/88  Max: 154/83 136/88   Pulse  Min: 87  Max: 110  88   Resp  Min:  16  Max: 20 17   SpO2  Min: 98 %  Max: 100 % 99 %       General appearance: Well-developed, well-nourished female in no apparent distress.  No family at bedside.  HEENT: Atraumatic head. Moist mucous membranes of oral cavity.  Lungs: Clear to auscultation bilaterally.   Heart: Regular rate and rhythm.   Abdomen: Soft, non-distended, non-tender. Bowel sounds are hyperactive.   Extremities: No cyanosis, clubbing. No deformities.  Skin: No Rash. Warm and dry.  Neuro: Awake, alert and oriented. Motor and sensory exams grossly intact.  Psych/mental status: Appropriate mood and affect. Cooperative. Responds appropriately to questions.       LABS AND IMAGING:     Recent Labs   Lab 04/28/23  0219   WBC 8.7   RBC 4.37   HGB 13.0   HCT 38.5   MCV 88.1   MCH 29.7   MCHC 33.8   RDW 12.0      MPV 10.5*       Recent Labs   Lab 04/28/23  0219      K 3.5   CO2 26   BUN 13.0   CREATININE 0.84   CALCIUM 9.3   ALBUMIN 4.2   ALKPHOS 65   ALT 19   AST 16   BILITOT 0.3       Microbiology Results (last 7 days)       ** No results found for the last 168 hours. **             X-Ray Chest PA And Lateral  Narrative: EXAMINATION:  XR CHEST PA AND LATERAL    CLINICAL HISTORY:  Chest Pain;    TECHNIQUE:  Two views of the chest    COMPARISON:  03/03/2023    FINDINGS:  No focal opacification, pleural effusion, or pneumothorax.    The cardiomediastinal silhouette is within normal limits.    No acute osseous abnormality.  Impression: No acute cardiopulmonary process.    Electronically signed by: Justin Burton  Date:    04/28/2023  Time:    06:32        ASSESSMENT & PLAN:   Assessment:  Acute chest pain vs acid reflux  History of lupus on Plaquenil and CellCept, anxiety/depression, seizure disorder    Plan:  - Cardiology consulted appreciate recommendations  - Continue to troponin  - Telemetry monitoring   - Resume appropriate home medications when deemed necessary   - Labs in AM      VTE Prophylaxis: will be placed on Lovenox for DVT  prophylaxis and will be advised to be as mobile as possible and sit in a chair as tolerated      __________________________________________________________________________  INPATIENT LIST OF MEDICATIONS     Current Facility-Administered Medications:     acetaminophen tablet 650 mg, 650 mg, Oral, Q6H PRN, Nohemi Whatley AGACNP-BC    enoxaparin injection 40 mg, 40 mg, Subcutaneous, Daily, CJ NagelCNP-BC    LORazepam tablet 2 mg, 2 mg, Oral, ED 1 Time, Eden Goldman MD    melatonin tablet 6 mg, 6 mg, Oral, Nightly PRN, Nohemi Whatley AGACNP-BC    metoprolol succinate (TOPROL-XL) 24 hr tablet 25 mg, 25 mg, Oral, Daily, MARIAN Horvath    naloxone 0.4 mg/mL injection 0.02 mg, 0.02 mg, Intravenous, PRN, Nohemi Whatley AGACNP-BC    ondansetron injection 4 mg, 4 mg, Intravenous, Q4H PRN, CJ NagelCNP-BC    polyethylene glycol packet 17 g, 17 g, Oral, BID PRN, Nohemi Whatley AGACNP-BC    prochlorperazine injection Soln 5 mg, 5 mg, Intravenous, Q6H PRN, Nohemi Whatley AGACNP-BC    simethicone chewable tablet 80 mg, 1 tablet, Oral, QID PRN, Nohemi Whatley AGACNP-BC    Current Outpatient Medications:     HYDROcodone-acetaminophen (NORCO)  mg per tablet, Take 1 tablet by mouth 3 (three) times daily as needed for Pain., Disp: 90 tablet, Rfl: 0    albuterol (PROVENTIL/VENTOLIN HFA) 90 mcg/actuation inhaler, Take 2 puffs by mouth every 6 (six) hours as needed., Disp: , Rfl:     belimumab (BENLYSTA) 200 mg/mL AtIn, Inject 1 mL (200 mg total) into the skin every 7 days., Disp: 4 each, Rfl: 11    cholecalciferol, vitamin D3, 1,250 mcg (50,000 unit) capsule, Take 1 capsule (50,000 Units total) by mouth once a week., Disp: 5 capsule, Rfl: 5    clonazePAM (KLONOPIN) 1 MG tablet, Take 1 tablet (1 mg total) by mouth 3 (three) times daily as needed for Anxiety., Disp: 90 tablet, Rfl: 5    dextroamphetamine-amphetamine 30 mg Tab, Take 1 tablet by mouth 2 (two) times daily., Disp: , Rfl:      dupilumab (DUPIXENT) 300 mg/2 mL Syrg, Inject 300 mg into the skin. Every other week, Disp: , Rfl:     EScitalopram oxalate (LEXAPRO) 20 MG tablet, Take 1 tablet (20 mg total) by mouth once daily., Disp: 30 tablet, Rfl: 5    fluticasone propionate (FLONASE) 50 mcg/actuation nasal spray, 1 spray by Each Nare route once daily., Disp: , Rfl:     hydrOXYchloroQUINE (PLAQUENIL) 200 mg tablet, Take 1 tablet (200 mg total) by mouth 2 (two) times daily., Disp: 60 tablet, Rfl: 5    levocetirizine (XYZAL) 5 MG tablet, Take 1 tablet (5 mg total) by mouth every evening., Disp: 30 tablet, Rfl: 11    mycophenolate (CELLCEPT) 500 mg Tab, Take 2 tablets (1,000 mg total) by mouth 2 (two) times daily., Disp: 120 tablet, Rfl: 11    ondansetron (ZOFRAN) 4 MG tablet, Take 1 tablet (4 mg total) by mouth every 6 (six) hours., Disp: 12 tablet, Rfl: 0    predniSONE (DELTASONE) 5 MG tablet, Take 1 tablet (5 mg total) by mouth daily as needed (flare ups). AFTER BREAKFAST, Disp: 30 tablet, Rfl: 5    pregabalin (LYRICA) 25 MG capsule, Take 1 capsule (25 mg total) by mouth 2 (two) times daily., Disp: 60 capsule, Rfl: 5    tiZANidine (ZANAFLEX) 2 MG tablet, Take 1 tablet (2 mg total) by mouth nightly., Disp: 30 tablet, Rfl: 5      Scheduled Meds:   enoxaparin  40 mg Subcutaneous Daily    LORazepam  2 mg Oral ED 1 Time    metoprolol succinate  25 mg Oral Daily     Continuous Infusions:  PRN Meds:.acetaminophen, melatonin, naloxone, ondansetron, polyethylene glycol, prochlorperazine, simethicone      Discharge Planning and Disposition: Anticipated discharge to be determined.    Dede THOMAS PA, have reviewed and discussed the case with Dr. Ignacio Benítez.    Please see the following addendum for further assessment and plan from there attending MD. Dede Berry PA-C  04/28/2023

## 2023-04-28 NOTE — Clinical Note
Diagnosis: Acute chest pain [[id number]]   Future Attending Provider: MAN BILL [[id number]]   Admitting Provider:: MAN BILL [[id number]]

## 2023-04-28 NOTE — CONSULTS
Inpatient consult to Cardiology  Consult performed by: MARIAN Horvath  Consult ordered by: Eden Goldman MD    [facility] - Emergency Dept  Cardiology  Consult Note    Patient Name: Mary Fernandez  MRN: [id number]  Admission Date: 4/28/2023  Hospital Length of Stay: 0 days  Code Status: Full Code   Attending Provider: Ignacio Benítez MD   Consulting Provider: MARIAN Horvath  Primary Care Physician: Nikki Aguayo MD  Principal Problem:<principal problem not specified>    Patient information was obtained from patient, past medical records, and ER records.     Subjective:     Chief Complaint:  anxiety, chest pain    HPI:   Ms. Fernandez is a 39 y/o female, followed by Dr. Bejarano, who presented to ED with c/o chest pain. She reports she is on chronic pain medication - Norco which she takes 4 times per day. She was busy at work and took a Norco at 2030 then forgot and took another Norco at 2330 then panicked thinking she overdosed. She then became anxious and developed chest pain. In Ed, workup revealed normal CBC, BMP, BNP, and troponin. CXR negative. She was tearful and mildly tachycardic on admit.     PMH: Lupus, brain tumor, chronic interstitial cystitis, ADHD, connective tissue disorder, endometriosis, OCD  PSH: rhinoplasty, neck surgery, breast augmentation, hysterectomy  Family History: Father- MI, HTN, alcohol abuse; Mother- HTN, drug abuse  Social History: former smoker    Previous Cardiac Diagnostics:   MPI 01.30.23:  Normal perfusion study. No perfusion defects noted. No evidence of ischemia. LV cavity normal on stress study. LVEF 51% and LV global function is normal.     Holter 01.10.23:  Patient was monitored for 21 hours and 48hr. There was a total of [id number] beats. Less than 0.5% were ventricular ectopy, less than 0.5% were supraventricular ectopy beats. Predominant rhythm is normal SR. Minimum HR recorded 76 bpm. Maximum  bpm. Mean HR 94 bpm. Rare PACs noted. No  evidence of supraventricular tachycardia is noted. Rare PVCs noted. No evidence of VT, SVT or ventricular arrhythmia noted. No pauses, Afib noted. Frequent ST    CUS 01.06.23:  ROGER demonstrated no hemodynamically significant stenosis  LICA demonstrated no hemodynamically significant stenosis.  Bilateral vertebral arteries were patent with antegrade flow.     TTE 01.06.2023:  LV is normal in size with normal systolic function. Estimated EF 60%. Normal LV diastolic function. Normal RV size with normal RVSF. No significant valvular abnormalities noted in this study.     Stress Echo 02.20.20:  HR recovery is normal. Functional capacity is fair. 7.3 METS. Stress EKG normal. During peak exercise EF has improved from 60-70%. Combined results of normal stress EKG and normal stress echo rules out stress induced WMA and hence ischemia.       Past Medical History:   Diagnosis Date    ADHD (attention deficit hyperactivity disorder)     Allergy     Anxiety     Atopic dermatitis 2018    Brain tumor 2008    Depression     Endometriosis     GERD (gastroesophageal reflux disease)     IC (interstitial cystitis)     Obsessive-compulsive disorder     Seizures     Systemic lupus erythematosus, organ or system involvement unspecified        Past Surgical History:   Procedure Laterality Date    AUGMENTATION OF BREAST      CERVICAL FUSION      HYSTERECTOMY      LAPAROSCOPIC DRAINAGE OF CYST OF OVARY      NASAL SEPTOPLASTY  2007       Review of patient's allergies indicates:   Allergen Reactions    Ibuprofen Anaphylaxis     Other reaction(s): Not Indicated    Metoclopramide Anaphylaxis and Other (See Comments)     Other reaction(s): Paralysis of vertical movement      Carisoprodol Other (See Comments)    Doxycycline      Other reaction(s): Chest pain  Other reaction(s): Chest pain      Gabapentin Other (See Comments)     Other reaction(s): C/O - a headache      Metoclopramide hcl      Other reaction(s): Respiratory arrest    Sucralfate       Chest discomfort    Sulfamethoxazole-trimethoprim      Other reaction(s): hives  Other reaction(s): hives      Cephalexin Rash    Clindamycin Rash    Cyclobenzaprine Palpitations     Other reaction(s): SOB and increased heart rate      Morphine Itching    Penicillin v potassium Rash     Other reaction(s): Rash      Rizatriptan Nausea And Vomiting       No current facility-administered medications on file prior to encounter.     Current Outpatient Medications on File Prior to Encounter   Medication Sig    HYDROcodone-acetaminophen (NORCO)  mg per tablet Take 1 tablet by mouth 3 (three) times daily as needed for Pain.    albuterol (PROVENTIL/VENTOLIN HFA) 90 mcg/actuation inhaler Take 2 puffs by mouth every 6 (six) hours as needed.    belimumab (BENLYSTA) 200 mg/mL AtIn Inject 1 mL (200 mg total) into the skin every 7 days.    cholecalciferol, vitamin D3, 1,250 mcg (50,000 unit) capsule Take 1 capsule (50,000 Units total) by mouth once a week.    clonazePAM (KLONOPIN) 1 MG tablet Take 1 tablet (1 mg total) by mouth 3 (three) times daily as needed for Anxiety.    dextroamphetamine-amphetamine 30 mg Tab Take 1 tablet by mouth 2 (two) times daily.    dupilumab (DUPIXENT) 300 mg/2 mL Syrg Inject 300 mg into the skin. Every other week    EScitalopram oxalate (LEXAPRO) 20 MG tablet Take 1 tablet (20 mg total) by mouth once daily.    fluticasone propionate (FLONASE) 50 mcg/actuation nasal spray 1 spray by Each Nare route once daily.    hydrOXYchloroQUINE (PLAQUENIL) 200 mg tablet Take 1 tablet (200 mg total) by mouth 2 (two) times daily.    levocetirizine (XYZAL) 5 MG tablet Take 1 tablet (5 mg total) by mouth every evening.    mycophenolate (CELLCEPT) 500 mg Tab Take 2 tablets (1,000 mg total) by mouth 2 (two) times daily.    ondansetron (ZOFRAN) 4 MG tablet Take 1 tablet (4 mg total) by mouth every 6 (six) hours.    predniSONE (DELTASONE) 5 MG tablet Take 1 tablet (5 mg total) by mouth daily as needed (flare ups).  AFTER BREAKFAST    pregabalin (LYRICA) 25 MG capsule Take 1 capsule (25 mg total) by mouth 2 (two) times daily.    tiZANidine (ZANAFLEX) 2 MG tablet Take 1 tablet (2 mg total) by mouth nightly.     Family History       Problem Relation (Age of Onset)    Alcohol abuse Father    Drug abuse Mother    Early death Father    Heart disease Father    Hypertension Mother, Father          Tobacco Use    Smoking status: Former    Smokeless tobacco: Never   Substance and Sexual Activity    Alcohol use: Yes    Drug use: Never    Sexual activity: Yes       Review of Systems   Respiratory: Negative.     Cardiovascular:  Positive for chest pain.   Gastrointestinal: Negative.    Genitourinary: Negative.    Musculoskeletal:  Positive for myalgias.   Skin: Negative.    Neurological: Negative.    Psychiatric/Behavioral:          Anxious     Objective:     Vital Signs (Most Recent):  Temp: 97.6 °F (36.4 °C) (04/28/23 0725)  Pulse: 88 (04/28/23 0725)  Resp: 16 (04/28/23 0725)  BP: 136/88 (04/28/23 0725)  SpO2: 98 % (04/28/23 0725) Vital Signs (24h Range):  Temp:  [97.6 °F (36.4 °C)-98.4 °F (36.9 °C)] 97.6 °F (36.4 °C)  Pulse:  [] 88  Resp:  [16-20] 16  SpO2:  [98 %-100 %] 98 %  BP: (136-154)/(83-93) 136/88     Weight: 91.2 kg (201 lb)  Body mass index is 31.48 kg/m².    SpO2: 98 %       No intake or output data in the 24 hours ending 04/28/23 0815    Lines/Drains/Airways       Peripheral Intravenous Line  Duration                  Peripheral IV - Single Lumen 04/28/23 0500 20 G Right Antecubital <1 day                    Significant Labs:  Recent Results (from the past 72 hour(s))   Comprehensive metabolic panel    Collection Time: 04/28/23  2:19 AM   Result Value Ref Range    Sodium Level 139 136 - 145 mmol/L    Potassium Level 3.5 3.5 - 5.1 mmol/L    Chloride 105 98 - 107 mmol/L    Carbon Dioxide 26 22 - 29 mmol/L    Glucose Level 94 74 - 100 mg/dL    Blood Urea Nitrogen 13.0 7.0 - 18.7 mg/dL    Creatinine 0.84 0.55 - 1.02 mg/dL     Calcium Level Total 9.3 8.4 - 10.2 mg/dL    Protein Total 7.5 6.4 - 8.3 gm/dL    Albumin Level 4.2 3.5 - 5.0 g/dL    Globulin 3.3 2.4 - 3.5 gm/dL    Albumin/Globulin Ratio 1.3 1.1 - 2.0 ratio    Bilirubin Total 0.3 <=1.5 mg/dL    Alkaline Phosphatase 65 40 - 150 unit/L    Alanine Aminotransferase 19 0 - 55 unit/L    Aspartate Aminotransferase 16 5 - 34 unit/L    eGFR >60 mls/min/1.73/m2   Brain natriuretic peptide    Collection Time: 04/28/23  2:19 AM   Result Value Ref Range    Natriuretic Peptide <10.0 <=100.0 pg/mL   Troponin I    Collection Time: 04/28/23  2:19 AM   Result Value Ref Range    Troponin-I <0.010 0.000 - 0.045 ng/mL   Protime-INR    Collection Time: 04/28/23  2:19 AM   Result Value Ref Range    PT 12.4 (L) 12.5 - 14.5 seconds    INR 0.93 0.00 - 1.30   CBC with Differential    Collection Time: 04/28/23  2:19 AM   Result Value Ref Range    WBC 8.7 4.5 - 11.5 x10(3)/mcL    RBC 4.37 4.20 - 5.40 x10(6)/mcL    Hgb 13.0 12.0 - 16.0 g/dL    Hct 38.5 37.0 - 47.0 %    MCV 88.1 80.0 - 94.0 fL    MCH 29.7 27.0 - 31.0 pg    MCHC 33.8 33.0 - 36.0 g/dL    RDW 12.0 11.5 - 17.0 %    Platelet 221 130 - 400 x10(3)/mcL    MPV 10.5 (H) 7.4 - 10.4 fL    Neut % 63.3 %    Lymph % 26.2 %    Mono % 8.6 %    Eos % 1.3 %    Basophil % 0.3 %    Lymph # 2.28 0.6 - 4.6 x10(3)/mcL    Neut # 5.51 2.1 - 9.2 x10(3)/mcL    Mono # 0.75 0.1 - 1.3 x10(3)/mcL    Eos # 0.11 0 - 0.9 x10(3)/mcL    Baso # 0.03 0 - 0.2 x10(3)/mcL    IG# 0.03 0 - 0.04 x10(3)/mcL    IG% 0.3 %    NRBC% 0.0 %   Lipase    Collection Time: 04/28/23  2:19 AM   Result Value Ref Range    Lipase Level 26 <=60 U/L       Significant Imaging:  Imaging Results              X-Ray Chest PA And Lateral (Final result)  Result time 04/28/23 06:32:37      Final result by Justin Burton MD (04/28/23 06:32:37)                   Impression:      No acute cardiopulmonary process.      Electronically signed by: Justin Burton  Date:    04/28/2023  Time:    06:32                Narrative:    EXAMINATION:  XR CHEST PA AND LATERAL    CLINICAL HISTORY:  Chest Pain;    TECHNIQUE:  Two views of the chest    COMPARISON:  03/03/2023    FINDINGS:  No focal opacification, pleural effusion, or pneumothorax.    The cardiomediastinal silhouette is within normal limits.    No acute osseous abnormality.                                      EKG:          Physical Exam  HENT:      Mouth/Throat:      Mouth: Mucous membranes are moist.   Cardiovascular:      Rate and Rhythm: Regular rhythm. Tachycardia present.      Pulses: Normal pulses.      Heart sounds: Normal heart sounds.   Pulmonary:      Effort: Pulmonary effort is normal.      Breath sounds: Normal breath sounds.   Abdominal:      Palpations: Abdomen is soft.   Musculoskeletal:         General: Normal range of motion.   Skin:     General: Skin is warm.   Neurological:      Mental Status: She is alert.   Psychiatric:      Comments: anxious       Home Medications:   No current facility-administered medications on file prior to encounter.     Current Outpatient Medications on File Prior to Encounter   Medication Sig Dispense Refill    HYDROcodone-acetaminophen (NORCO)  mg per tablet Take 1 tablet by mouth 3 (three) times daily as needed for Pain. 90 tablet 0    albuterol (PROVENTIL/VENTOLIN HFA) 90 mcg/actuation inhaler Take 2 puffs by mouth every 6 (six) hours as needed.      belimumab (BENLYSTA) 200 mg/mL AtIn Inject 1 mL (200 mg total) into the skin every 7 days. 4 each 11    cholecalciferol, vitamin D3, 1,250 mcg (50,000 unit) capsule Take 1 capsule (50,000 Units total) by mouth once a week. 5 capsule 5    clonazePAM (KLONOPIN) 1 MG tablet Take 1 tablet (1 mg total) by mouth 3 (three) times daily as needed for Anxiety. 90 tablet 5    dextroamphetamine-amphetamine 30 mg Tab Take 1 tablet by mouth 2 (two) times daily.      dupilumab (DUPIXENT) 300 mg/2 mL Syrg Inject 300 mg into the skin. Every other week      EScitalopram oxalate (LEXAPRO) 20  MG tablet Take 1 tablet (20 mg total) by mouth once daily. 30 tablet 5    fluticasone propionate (FLONASE) 50 mcg/actuation nasal spray 1 spray by Each Nare route once daily.      hydrOXYchloroQUINE (PLAQUENIL) 200 mg tablet Take 1 tablet (200 mg total) by mouth 2 (two) times daily. 60 tablet 5    levocetirizine (XYZAL) 5 MG tablet Take 1 tablet (5 mg total) by mouth every evening. 30 tablet 11    mycophenolate (CELLCEPT) 500 mg Tab Take 2 tablets (1,000 mg total) by mouth 2 (two) times daily. 120 tablet 11    ondansetron (ZOFRAN) 4 MG tablet Take 1 tablet (4 mg total) by mouth every 6 (six) hours. 12 tablet 0    predniSONE (DELTASONE) 5 MG tablet Take 1 tablet (5 mg total) by mouth daily as needed (flare ups). AFTER BREAKFAST 30 tablet 5    pregabalin (LYRICA) 25 MG capsule Take 1 capsule (25 mg total) by mouth 2 (two) times daily. 60 capsule 5    tiZANidine (ZANAFLEX) 2 MG tablet Take 1 tablet (2 mg total) by mouth nightly. 30 tablet 5       Current Inpatient Medications:    Current Facility-Administered Medications:     acetaminophen tablet 650 mg, 650 mg, Oral, Q6H PRN, Nohemi Whatley AGACNP-BC    enoxaparin injection 40 mg, 40 mg, Subcutaneous, Daily, Nohemi Whatley AGACNP-BC    LORazepam tablet 2 mg, 2 mg, Oral, ED 1 Time, Eden Goldman MD    melatonin tablet 6 mg, 6 mg, Oral, Nightly PRN, FIORDALIZA NagelP-BC    naloxone 0.4 mg/mL injection 0.02 mg, 0.02 mg, Intravenous, PRN, Nohemi Whatley AGACNP-BC    ondansetron injection 4 mg, 4 mg, Intravenous, Q4H PRN, Nohemi Whatley AGACNP-BC    polyethylene glycol packet 17 g, 17 g, Oral, BID PRN, Nohemi Whatley AGACNP-BC    prochlorperazine injection Soln 5 mg, 5 mg, Intravenous, Q6H PRN, Nohemi Whatley AGACNP-BC    simethicone chewable tablet 80 mg, 1 tablet, Oral, QID PRN, Nohemi Whatley, [facility]    Current Outpatient Medications:     HYDROcodone-acetaminophen (NORCO)  mg per tablet, Take 1 tablet by mouth 3 (three) times daily as  needed for Pain., Disp: 90 tablet, Rfl: 0    albuterol (PROVENTIL/VENTOLIN HFA) 90 mcg/actuation inhaler, Take 2 puffs by mouth every 6 (six) hours as needed., Disp: , Rfl:     belimumab (BENLYSTA) 200 mg/mL AtIn, Inject 1 mL (200 mg total) into the skin every 7 days., Disp: 4 each, Rfl: 11    cholecalciferol, vitamin D3, 1,250 mcg (50,000 unit) capsule, Take 1 capsule (50,000 Units total) by mouth once a week., Disp: 5 capsule, Rfl: 5    clonazePAM (KLONOPIN) 1 MG tablet, Take 1 tablet (1 mg total) by mouth 3 (three) times daily as needed for Anxiety., Disp: 90 tablet, Rfl: 5    dextroamphetamine-amphetamine 30 mg Tab, Take 1 tablet by mouth 2 (two) times daily., Disp: , Rfl:     dupilumab (DUPIXENT) 300 mg/2 mL Syrg, Inject 300 mg into the skin. Every other week, Disp: , Rfl:     EScitalopram oxalate (LEXAPRO) 20 MG tablet, Take 1 tablet (20 mg total) by mouth once daily., Disp: 30 tablet, Rfl: 5    fluticasone propionate (FLONASE) 50 mcg/actuation nasal spray, 1 spray by Each Nare route once daily., Disp: , Rfl:     hydrOXYchloroQUINE (PLAQUENIL) 200 mg tablet, Take 1 tablet (200 mg total) by mouth 2 (two) times daily., Disp: 60 tablet, Rfl: 5    levocetirizine (XYZAL) 5 MG tablet, Take 1 tablet (5 mg total) by mouth every evening., Disp: 30 tablet, Rfl: 11    mycophenolate (CELLCEPT) 500 mg Tab, Take 2 tablets (1,000 mg total) by mouth 2 (two) times daily., Disp: 120 tablet, Rfl: 11    ondansetron (ZOFRAN) 4 MG tablet, Take 1 tablet (4 mg total) by mouth every 6 (six) hours., Disp: 12 tablet, Rfl: 0    predniSONE (DELTASONE) 5 MG tablet, Take 1 tablet (5 mg total) by mouth daily as needed (flare ups). AFTER BREAKFAST, Disp: 30 tablet, Rfl: 5    pregabalin (LYRICA) 25 MG capsule, Take 1 capsule (25 mg total) by mouth 2 (two) times daily., Disp: 60 capsule, Rfl: 5    tiZANidine (ZANAFLEX) 2 MG tablet, Take 1 tablet (2 mg total) by mouth nightly., Disp: 30 tablet, Rfl: 5         VTE Risk Mitigation (From admission,  onward)           Ordered     enoxaparin injection 40 mg  Daily         04/28/23 0657     IP VTE HIGH RISK PATIENT  Once         04/28/23 0657     Place sequential compression device  Until discontinued         04/28/23 0657                    Assessment:   Chest pain  --Negative MPI 1.30.23  --negative stress echo 02.20.20  Tachycardia  --Holter 01.10.23: frequent ST noted.   Anxiety  Lupus  Chronic interstitial cystitis  ADHD  Hx brain tumor    Plan:   Atypical noncardiac chest pain, chronic.   Recent stress study is negative.   No further cardiac workup is indicated  Will start low dose betablocker due to ST. Will help in HR control and anxiety        Thank you for your consult. CIS signing off.     MARIAN Horvath and Cory Rosario MD  Cardiology  [facility] - Emergency Dept  04/28/2023 8:15 AM

## 2023-04-29 VITALS
HEIGHT: 67 IN | SYSTOLIC BLOOD PRESSURE: 102 MMHG | HEART RATE: 72 BPM | DIASTOLIC BLOOD PRESSURE: 66 MMHG | TEMPERATURE: 98 F | OXYGEN SATURATION: 97 % | BODY MASS INDEX: 31.55 KG/M2 | WEIGHT: 201 LBS | RESPIRATION RATE: 18 BRPM

## 2023-04-29 PROBLEM — R07.9 CHEST PAIN: Status: ACTIVE | Noted: 2023-04-29

## 2023-04-29 PROBLEM — R07.9 CHEST PAIN: Status: RESOLVED | Noted: 2023-04-29 | Resolved: 2023-04-29

## 2023-04-29 LAB
ALBUMIN SERPL-MCNC: 3.7 G/DL (ref 3.5–5)
ALBUMIN/GLOB SERPL: 1.1 RATIO (ref 1.1–2)
ALP SERPL-CCNC: 55 UNIT/L (ref 40–150)
ALT SERPL-CCNC: 14 UNIT/L (ref 0–55)
AST SERPL-CCNC: 12 UNIT/L (ref 5–34)
BASOPHILS # BLD AUTO: 0.02 X10(3)/MCL (ref 0–0.2)
BASOPHILS NFR BLD AUTO: 0.3 %
BILIRUBIN DIRECT+TOT PNL SERPL-MCNC: 0.5 MG/DL
BUN SERPL-MCNC: 17.3 MG/DL (ref 7–18.7)
CALCIUM SERPL-MCNC: 9.3 MG/DL (ref 8.4–10.2)
CHLORIDE SERPL-SCNC: 109 MMOL/L (ref 98–107)
CO2 SERPL-SCNC: 26 MMOL/L (ref 22–29)
CREAT SERPL-MCNC: 0.85 MG/DL (ref 0.55–1.02)
EOSINOPHIL # BLD AUTO: 0.12 X10(3)/MCL (ref 0–0.9)
EOSINOPHIL NFR BLD AUTO: 2 %
ERYTHROCYTE [DISTWIDTH] IN BLOOD BY AUTOMATED COUNT: 12.2 % (ref 11.5–17)
GFR SERPLBLD CREATININE-BSD FMLA CKD-EPI: >60 MLS/MIN/1.73/M2
GLOBULIN SER-MCNC: 3.4 GM/DL (ref 2.4–3.5)
GLUCOSE SERPL-MCNC: 101 MG/DL (ref 74–100)
HCT VFR BLD AUTO: 39.8 % (ref 37–47)
HGB BLD-MCNC: 13.4 G/DL (ref 12–16)
IMM GRANULOCYTES # BLD AUTO: 0.03 X10(3)/MCL (ref 0–0.04)
IMM GRANULOCYTES NFR BLD AUTO: 0.5 %
LYMPHOCYTES # BLD AUTO: 1.82 X10(3)/MCL (ref 0.6–4.6)
LYMPHOCYTES NFR BLD AUTO: 30.2 %
MAGNESIUM SERPL-MCNC: 2.2 MG/DL (ref 1.6–2.6)
MCH RBC QN AUTO: 30.2 PG (ref 27–31)
MCHC RBC AUTO-ENTMCNC: 33.7 G/DL (ref 33–36)
MCV RBC AUTO: 89.6 FL (ref 80–94)
MONOCYTES # BLD AUTO: 0.58 X10(3)/MCL (ref 0.1–1.3)
MONOCYTES NFR BLD AUTO: 9.6 %
NEUTROPHILS # BLD AUTO: 3.45 X10(3)/MCL (ref 2.1–9.2)
NEUTROPHILS NFR BLD AUTO: 57.4 %
NRBC BLD AUTO-RTO: 0 %
PHOSPHATE SERPL-MCNC: 3.3 MG/DL (ref 2.3–4.7)
PLATELET # BLD AUTO: 234 X10(3)/MCL (ref 130–400)
PMV BLD AUTO: 10.7 FL (ref 7.4–10.4)
POTASSIUM SERPL-SCNC: 3.7 MMOL/L (ref 3.5–5.1)
PROT SERPL-MCNC: 7.1 GM/DL (ref 6.4–8.3)
RBC # BLD AUTO: 4.44 X10(6)/MCL (ref 4.2–5.4)
SODIUM SERPL-SCNC: 142 MMOL/L (ref 136–145)
WBC # SPEC AUTO: 6 X10(3)/MCL (ref 4.5–11.5)

## 2023-04-29 PROCEDURE — 25000003 PHARM REV CODE 250: Performed by: NURSE PRACTITIONER

## 2023-04-29 PROCEDURE — G0378 HOSPITAL OBSERVATION PER HR: HCPCS

## 2023-04-29 PROCEDURE — 84100 ASSAY OF PHOSPHORUS: CPT | Performed by: NURSE PRACTITIONER

## 2023-04-29 PROCEDURE — 85025 COMPLETE CBC W/AUTO DIFF WBC: CPT | Performed by: NURSE PRACTITIONER

## 2023-04-29 PROCEDURE — 25000003 PHARM REV CODE 250: Performed by: INTERNAL MEDICINE

## 2023-04-29 PROCEDURE — 83735 ASSAY OF MAGNESIUM: CPT | Performed by: NURSE PRACTITIONER

## 2023-04-29 PROCEDURE — 80053 COMPREHEN METABOLIC PANEL: CPT | Performed by: NURSE PRACTITIONER

## 2023-04-29 RX ORDER — METOPROLOL SUCCINATE 25 MG/1
25 TABLET, EXTENDED RELEASE ORAL DAILY
Qty: 30 TABLET | Refills: 2 | Status: SHIPPED | OUTPATIENT
Start: 2023-04-29 | End: 2023-09-05

## 2023-04-29 RX ORDER — PANTOPRAZOLE SODIUM 40 MG/1
40 TABLET, DELAYED RELEASE ORAL DAILY
Qty: 30 TABLET | Refills: 11 | Status: SHIPPED | OUTPATIENT
Start: 2023-04-29 | End: 2023-09-05 | Stop reason: SDUPTHER

## 2023-04-29 RX ORDER — ERGOCALCIFEROL 1.25 MG/1
50000 CAPSULE ORAL
Qty: 4 CAPSULE | Refills: 2 | Status: SHIPPED | OUTPATIENT
Start: 2023-05-05 | End: 2023-07-22

## 2023-04-29 RX ORDER — POLYETHYLENE GLYCOL 3350 17 G/17G
17 POWDER, FOR SOLUTION ORAL 2 TIMES DAILY PRN
Qty: 10 PACKET | Refills: 0 | Status: SHIPPED | OUTPATIENT
Start: 2023-04-29 | End: 2023-05-13

## 2023-04-29 RX ADMIN — METOPROLOL SUCCINATE 25 MG: 25 TABLET, EXTENDED RELEASE ORAL at 09:04

## 2023-04-29 RX ADMIN — PANTOPRAZOLE SODIUM 40 MG: 40 TABLET, DELAYED RELEASE ORAL at 09:04

## 2023-04-29 RX ADMIN — ESCITALOPRAM OXALATE 20 MG: 10 TABLET ORAL at 09:04

## 2023-04-29 RX ADMIN — HYDROXYCHLOROQUINE SULFATE 200 MG: 200 TABLET ORAL at 09:04

## 2023-04-29 NOTE — DISCHARGE SUMMARY
[facility] - Observation Unit  Hospital Medicine  Discharge Summary      Patient Name: Mary Fernandez  MRN: [id number]  [facility]: [id number]  Patient Class: OP- Observation  Admission Date: 4/28/2023  Hospital Length of Stay: 0 days  Discharge Date and Time:  04/29/2023 8:39 AM  Attending Physician: Juan Alberto Rios MD   Discharging Provider: Juan Alberto Rios MD  Primary Care Provider: Nikki Aguayo MD    Primary Care Team: Networked reference to record PCT     Mary Fernandez is a 38 y.o. White female with a past medical history of lupus on Plaquenil and CellCept, anxiety/depression, seizure disorder. The patient presented to [facility] on 4/28/2023 with a primary complaint of chest pain which began last night (04/27/2023) around 11:30 p.m..  Patient reports she was getting ready for bed when chest pain started.  She describes chest pain is located center of the chest, intermittent since onset, dull without radiation.  She currently rates chest pain as a 0/10 on exam and feels as if she was experiencing more acid reflux symptoms at this time.  She reports at the onset of chest pain she became very anxious having a panic attack.  She also complains of right lower quadrant abdominal pain.  Her last bowel movement was yesterday and has been daily.  She has a cardiologist Dr. Cisneros.  She denies complaints of cough, vomiting, diarrhea, fever.     Upon presentation to the ED, temperature 98° F, heart rate 110, blood pressure 154/83, respiratory rate 17 and SpO2 100% on room air.  CBC and CMP within limits.  Troponin less than 0.01 x 2.  EKG sinus tachycardia ventricular rate of 113, age undetermined septal infarct, and T-wave abnormalities considering inferior ischemia.  Chest x-ray without acute cardiopulmonary process.  In ED patient received Ativan and GI cocktail.  Patient is admitted to hospital medicine services for further medical management.    Telemetry, troponin trending was continued.   Cardiology was consulted for further recommendations.  Home medications were reviewed and renewed .  troponin remained within normal limits.  Recommended no workup as inpatient due to recent negative stress study as outpatient.  Low-dose beta-blocker was added to help control heart rate.  She remained hemodynamically stable, chest pain has resolved and will be discharged to home today.  Recommended follow-up with rheumatology, primary care physician.  She verbalized understanding of all the recommendations.      Atypical chest pain-resolved, likely musculoskeletal vs GERD related  Tachycardia-?  Anxiety related     HX:  Lupus on Plaquenil and CellCept, chronic anxiety/panic disorder, chronic interstitial cystitis, ADHD, history of brain tumor, febrile seizure disorder as a child      Goals of Care Treatment Preferences:  Code Status: Full Code  Vitals:    04/29/23 0742   BP: 102/66   Pulse: 72   Resp: 18   Temp: 97.6 °F (36.4 °C)      General: Comfortable, not in distress  Respiratory: Clear to auscultation bilaterally, nonlabored breathing  Cardiovascular: RRR, S1, S2  Abdominal: Soft, nontender, nondistended  Neurological: AOx4, no focal deficits  Psychiatric: Cooperative       Consults:   Consults (From admission, onward)          Status Ordering Provider     Inpatient consult to Cardiology  Once        Provider:  Cory Dominguez MD    Completed CHERYL LAST            No new Assessment & Plan notes have been filed under this hospital service since the last note was generated.  Service: Hospital Medicine    Final Active Diagnoses:      Problems Resolved During this Admission:    Diagnosis Date Noted Date Resolved POA    PRINCIPAL PROBLEM:  Chest pain [R07.9] 04/29/2023 04/29/2023 Yes       Discharged Condition: good    Disposition: Home or Self Care    Follow Up:   Follow-up Information       MARIAN Duong Follow up.    Specialty: Family Medicine  Why: 1 week post discharge  Contact information:  [phone]  [place] [address]  [phone]                           Patient Instructions:   No discharge procedures on file.    Significant Diagnostic Studies: Labs: CMP   Recent Labs   Lab 04/28/23  0219 04/29/23  0531    142   K 3.5 3.7   CO2 26 26   BUN 13.0 17.3   CREATININE 0.84 0.85   CALCIUM 9.3 9.3   ALBUMIN 4.2 3.7   BILITOT 0.3 0.5   ALKPHOS 65 55   AST 16 12   ALT 19 14       Pending Diagnostic Studies:       None           Medications:  Reconciled Home Medications:      Medication List        START taking these medications      ergocalciferol 50,000 unit Cap  Commonly known as: ERGOCALCIFEROL  Take 1 capsule (50,000 Units total) by mouth every 7 days. for 12 doses  Start taking on: May 5, 2023     metoprolol succinate 25 MG 24 hr tablet  Commonly known as: TOPROL-XL  Take 1 tablet (25 mg total) by mouth once daily.     pantoprazole 40 MG tablet  Commonly known as: PROTONIX  Take 1 tablet (40 mg total) by mouth once daily.     polyethylene glycol 17 gram Pwpk  Commonly known as: GLYCOLAX  Take 17 g by mouth 2 (two) times daily as needed (constipation).            CONTINUE taking these medications      albuterol 90 mcg/actuation inhaler  Commonly known as: PROVENTIL/VENTOLIN HFA  Take 2 puffs by mouth every 6 (six) hours as needed.     belimumab 200 mg/mL Atin  Commonly known as: BENLYSTA  Inject 1 mL (200 mg total) into the skin every 7 days.     clonazePAM 1 MG tablet  Commonly known as: KlonoPIN  Take 1 tablet (1 mg total) by mouth 3 (three) times daily as needed for Anxiety.     dextroamphetamine-amphetamine 30 mg Tab  Take 1 tablet by mouth 2 (two) times daily.     dupilumab 300 mg/2 mL Syrg  Commonly known as: DUPIXENT  Inject 300 mg into the skin. Every other week     EScitalopram oxalate 20 MG tablet  Commonly known as: LEXAPRO  Take 1 tablet (20 mg total) by mouth once daily.     fluticasone propionate 50 mcg/actuation nasal spray  Commonly known as: FLONASE  1 spray by Each Nare route  once daily.     HYDROcodone-acetaminophen  mg per tablet  Commonly known as: NORCO  Take 1 tablet by mouth 3 (three) times daily as needed for Pain.     hydrOXYchloroQUINE 200 mg tablet  Commonly known as: PLAQUENIL  Take 1 tablet (200 mg total) by mouth 2 (two) times daily.     levocetirizine 5 MG tablet  Commonly known as: XYZAL  Take 1 tablet (5 mg total) by mouth every evening.     mycophenolate 500 mg Tab  Commonly known as: CELLCEPT  Take 2 tablets (1,000 mg total) by mouth 2 (two) times daily.     ondansetron 4 MG tablet  Commonly known as: ZOFRAN  Take 1 tablet (4 mg total) by mouth every 6 (six) hours.     predniSONE 5 MG tablet  Commonly known as: DELTASONE  Take 1 tablet (5 mg total) by mouth daily as needed (flare ups). AFTER BREAKFAST     pregabalin 25 MG capsule  Commonly known as: LYRICA  Take 1 capsule (25 mg total) by mouth 2 (two) times daily.     tiZANidine 2 MG tablet  Commonly known as: ZANAFLEX  Take 1 tablet (2 mg total) by mouth nightly.            STOP taking these medications      cholecalciferol (vitamin D3) 1,250 mcg (50,000 unit) capsule              Indwelling Lines/Drains at time of discharge:   Lines/Drains/Airways       None                   Time spent on the discharge of patient: 35 minutes         Juan Alberto Rios MD  Department of Hospital Medicine  [facility] - Observation Unit

## 2023-05-01 ENCOUNTER — HOSPITAL ENCOUNTER (EMERGENCY)
Facility: HOSPITAL | Age: 38
Discharge: LEFT BEFORE DISCHARGE | End: 2023-05-02
Payer: MEDICAID

## 2023-05-01 VITALS
SYSTOLIC BLOOD PRESSURE: 132 MMHG | BODY MASS INDEX: 32.95 KG/M2 | OXYGEN SATURATION: 98 % | RESPIRATION RATE: 20 BRPM | TEMPERATURE: 98 F | DIASTOLIC BLOOD PRESSURE: 87 MMHG | WEIGHT: 205 LBS | HEART RATE: 76 BPM | HEIGHT: 66 IN

## 2023-05-01 DIAGNOSIS — R07.9 CHEST PAIN: ICD-10-CM

## 2023-05-01 PROCEDURE — 99900041 HC LEFT WITHOUT BEING SEEN- EMERGENCY

## 2023-05-01 PROCEDURE — 93010 ELECTROCARDIOGRAM REPORT: CPT | Mod: ,,, | Performed by: INTERNAL MEDICINE

## 2023-05-01 PROCEDURE — 93005 ELECTROCARDIOGRAM TRACING: CPT

## 2023-05-01 PROCEDURE — 93010 EKG 12-LEAD: ICD-10-PCS | Mod: ,,, | Performed by: INTERNAL MEDICINE

## 2023-05-02 LAB
ALBUMIN SERPL-MCNC: 3.9 G/DL (ref 3.5–5)
ALBUMIN/GLOB SERPL: 1.3 RATIO (ref 1.1–2)
ALP SERPL-CCNC: 58 UNIT/L (ref 40–150)
ALT SERPL-CCNC: 13 UNIT/L (ref 0–55)
AST SERPL-CCNC: 12 UNIT/L (ref 5–34)
BASOPHILS # BLD AUTO: 0.02 X10(3)/MCL
BASOPHILS NFR BLD AUTO: 0.3 %
BILIRUBIN DIRECT+TOT PNL SERPL-MCNC: 0.2 MG/DL
BNP BLD-MCNC: <10 PG/ML
BUN SERPL-MCNC: 18.7 MG/DL (ref 7–18.7)
CALCIUM SERPL-MCNC: 9.3 MG/DL (ref 8.4–10.2)
CHLORIDE SERPL-SCNC: 105 MMOL/L (ref 98–107)
CO2 SERPL-SCNC: 25 MMOL/L (ref 22–29)
CREAT SERPL-MCNC: 0.83 MG/DL (ref 0.55–1.02)
EOSINOPHIL # BLD AUTO: 0.29 X10(3)/MCL (ref 0–0.9)
EOSINOPHIL NFR BLD AUTO: 3.8 %
ERYTHROCYTE [DISTWIDTH] IN BLOOD BY AUTOMATED COUNT: 11.9 % (ref 11.5–17)
GFR SERPLBLD CREATININE-BSD FMLA CKD-EPI: >60 MLS/MIN/1.73/M2
GLOBULIN SER-MCNC: 3 GM/DL (ref 2.4–3.5)
GLUCOSE SERPL-MCNC: 89 MG/DL (ref 74–100)
HCT VFR BLD AUTO: 35.5 % (ref 37–47)
HGB BLD-MCNC: 12.4 G/DL (ref 12–16)
IMM GRANULOCYTES # BLD AUTO: 0.02 X10(3)/MCL (ref 0–0.04)
IMM GRANULOCYTES NFR BLD AUTO: 0.3 %
INR BLD: 1 (ref 0–1.3)
LYMPHOCYTES # BLD AUTO: 2.47 X10(3)/MCL (ref 0.6–4.6)
LYMPHOCYTES NFR BLD AUTO: 32.4 %
MCH RBC QN AUTO: 30.2 PG (ref 27–31)
MCHC RBC AUTO-ENTMCNC: 34.9 G/DL (ref 33–36)
MCV RBC AUTO: 86.6 FL (ref 80–94)
MONOCYTES # BLD AUTO: 0.6 X10(3)/MCL (ref 0.1–1.3)
MONOCYTES NFR BLD AUTO: 7.9 %
NEUTROPHILS # BLD AUTO: 4.23 X10(3)/MCL (ref 2.1–9.2)
NEUTROPHILS NFR BLD AUTO: 55.3 %
NRBC BLD AUTO-RTO: 0 %
PLATELET # BLD AUTO: 242 X10(3)/MCL (ref 130–400)
PMV BLD AUTO: 10.9 FL (ref 7.4–10.4)
POTASSIUM SERPL-SCNC: 3.7 MMOL/L (ref 3.5–5.1)
PROT SERPL-MCNC: 6.9 GM/DL (ref 6.4–8.3)
PROTHROMBIN TIME: 13.1 SECONDS (ref 12.5–14.5)
RBC # BLD AUTO: 4.1 X10(6)/MCL (ref 4.2–5.4)
SODIUM SERPL-SCNC: 141 MMOL/L (ref 136–145)
TROPONIN I SERPL-MCNC: <0.01 NG/ML (ref 0–0.04)
WBC # SPEC AUTO: 7.63 X10(3)/MCL (ref 4.5–11.5)

## 2023-05-02 PROCEDURE — 85025 COMPLETE CBC W/AUTO DIFF WBC: CPT | Performed by: EMERGENCY MEDICINE

## 2023-05-02 PROCEDURE — 80053 COMPREHEN METABOLIC PANEL: CPT | Performed by: EMERGENCY MEDICINE

## 2023-05-02 PROCEDURE — 83880 ASSAY OF NATRIURETIC PEPTIDE: CPT | Performed by: EMERGENCY MEDICINE

## 2023-05-02 PROCEDURE — 84484 ASSAY OF TROPONIN QUANT: CPT | Performed by: EMERGENCY MEDICINE

## 2023-05-02 PROCEDURE — 85610 PROTHROMBIN TIME: CPT | Performed by: EMERGENCY MEDICINE

## 2023-05-24 DIAGNOSIS — D33.2 BENIGN NEOPLASM OF BRAIN, UNSPECIFIED BRAIN REGION: ICD-10-CM

## 2023-05-24 DIAGNOSIS — M51.37 DDD (DEGENERATIVE DISC DISEASE), LUMBOSACRAL: ICD-10-CM

## 2023-05-24 DIAGNOSIS — M79.7 FIBROMYALGIA: ICD-10-CM

## 2023-05-24 DIAGNOSIS — M35.9 UNDIFFERENTIATED CONNECTIVE TISSUE DISEASE: ICD-10-CM

## 2023-05-24 DIAGNOSIS — K21.9 GASTROESOPHAGEAL REFLUX DISEASE, UNSPECIFIED WHETHER ESOPHAGITIS PRESENT: ICD-10-CM

## 2023-05-24 DIAGNOSIS — L93.0 DISCOID LUPUS ERYTHEMATOSUS: ICD-10-CM

## 2023-05-24 DIAGNOSIS — M50.30 DEGENERATIVE CERVICAL DISC: ICD-10-CM

## 2023-05-24 DIAGNOSIS — F51.01 PRIMARY INSOMNIA: ICD-10-CM

## 2023-05-24 DIAGNOSIS — M32.9 SYSTEMIC LUPUS ERYTHEMATOSUS, UNSPECIFIED SLE TYPE, UNSPECIFIED ORGAN INVOLVEMENT STATUS: ICD-10-CM

## 2023-05-25 RX ORDER — HYDROCODONE BITARTRATE AND ACETAMINOPHEN 10; 325 MG/1; MG/1
1 TABLET ORAL 3 TIMES DAILY PRN
Qty: 90 TABLET | Refills: 0 | Status: SHIPPED | OUTPATIENT
Start: 2023-05-25 | End: 2023-06-26 | Stop reason: SDUPTHER

## 2023-05-28 ENCOUNTER — PATIENT MESSAGE (OUTPATIENT)
Dept: RHEUMATOLOGY | Facility: CLINIC | Age: 38
End: 2023-05-28
Payer: MEDICAID

## 2023-05-30 ENCOUNTER — HOSPITAL ENCOUNTER (OUTPATIENT)
Facility: HOSPITAL | Age: 38
Discharge: HOME | End: 2023-05-30
Attending: INTERNAL MEDICINE | Admitting: INTERNAL MEDICINE
Payer: MEDICAID

## 2023-05-30 VITALS
HEART RATE: 68 BPM | DIASTOLIC BLOOD PRESSURE: 65 MMHG | RESPIRATION RATE: 18 BRPM | SYSTOLIC BLOOD PRESSURE: 101 MMHG | HEIGHT: 66 IN | TEMPERATURE: 99 F | WEIGHT: 216.06 LBS | BODY MASS INDEX: 34.72 KG/M2 | OXYGEN SATURATION: 97 %

## 2023-05-30 DIAGNOSIS — R07.9 CHEST PAIN: Primary | ICD-10-CM

## 2023-05-30 DIAGNOSIS — R06.02 SHORTNESS OF BREATH: ICD-10-CM

## 2023-05-30 LAB — CATH EF QUANTITATIVE: 65 %

## 2023-05-30 PROCEDURE — 93005 ELECTROCARDIOGRAM TRACING: CPT | Mod: 59

## 2023-05-30 PROCEDURE — 25000003 PHARM REV CODE 250: Performed by: INTERNAL MEDICINE

## 2023-05-30 PROCEDURE — 93010 EKG 12-LEAD: ICD-10-PCS | Mod: ,,, | Performed by: INTERNAL MEDICINE

## 2023-05-30 PROCEDURE — 93010 ELECTROCARDIOGRAM REPORT: CPT | Mod: ,,, | Performed by: INTERNAL MEDICINE

## 2023-05-30 PROCEDURE — C1769 GUIDE WIRE: HCPCS | Performed by: INTERNAL MEDICINE

## 2023-05-30 PROCEDURE — 63600175 PHARM REV CODE 636 W HCPCS: Performed by: INTERNAL MEDICINE

## 2023-05-30 PROCEDURE — 27201423 OPTIME MED/SURG SUP & DEVICES STERILE SUPPLY: Performed by: INTERNAL MEDICINE

## 2023-05-30 PROCEDURE — C1894 INTRO/SHEATH, NON-LASER: HCPCS | Performed by: INTERNAL MEDICINE

## 2023-05-30 PROCEDURE — 93458 L HRT ARTERY/VENTRICLE ANGIO: CPT | Performed by: INTERNAL MEDICINE

## 2023-05-30 PROCEDURE — 99152 MOD SED SAME PHYS/QHP 5/>YRS: CPT | Performed by: INTERNAL MEDICINE

## 2023-05-30 RX ORDER — DIPHENHYDRAMINE HCL 25 MG
50 CAPSULE ORAL
Status: DISCONTINUED | OUTPATIENT
Start: 2023-05-30 | End: 2023-05-30 | Stop reason: HOSPADM

## 2023-05-30 RX ORDER — SODIUM CHLORIDE 0.9 % (FLUSH) 0.9 %
10 SYRINGE (ML) INJECTION
Status: DISCONTINUED | OUTPATIENT
Start: 2023-05-30 | End: 2023-05-30 | Stop reason: HOSPADM

## 2023-05-30 RX ORDER — ONDANSETRON 4 MG/1
8 TABLET, ORALLY DISINTEGRATING ORAL EVERY 8 HOURS PRN
Status: DISCONTINUED | OUTPATIENT
Start: 2023-05-30 | End: 2023-05-30 | Stop reason: HOSPADM

## 2023-05-30 RX ORDER — VERAPAMIL HYDROCHLORIDE 2.5 MG/ML
INJECTION, SOLUTION INTRAVENOUS
Status: DISCONTINUED | OUTPATIENT
Start: 2023-05-30 | End: 2023-05-30 | Stop reason: HOSPADM

## 2023-05-30 RX ORDER — SODIUM CHLORIDE 9 MG/ML
INJECTION, SOLUTION INTRAVENOUS CONTINUOUS
Status: DISCONTINUED | OUTPATIENT
Start: 2023-05-30 | End: 2023-05-30 | Stop reason: HOSPADM

## 2023-05-30 RX ORDER — FENTANYL CITRATE 50 UG/ML
INJECTION, SOLUTION INTRAMUSCULAR; INTRAVENOUS
Status: DISCONTINUED | OUTPATIENT
Start: 2023-05-30 | End: 2023-05-30 | Stop reason: HOSPADM

## 2023-05-30 RX ORDER — DIAZEPAM 5 MG/1
10 TABLET ORAL
Status: DISCONTINUED | OUTPATIENT
Start: 2023-05-30 | End: 2023-05-30 | Stop reason: HOSPADM

## 2023-05-30 RX ORDER — LIDOCAINE HYDROCHLORIDE 20 MG/ML
INJECTION, SOLUTION INFILTRATION; PERINEURAL
Status: DISCONTINUED | OUTPATIENT
Start: 2023-05-30 | End: 2023-05-30 | Stop reason: HOSPADM

## 2023-05-30 RX ORDER — HEPARIN SODIUM 1000 [USP'U]/ML
INJECTION, SOLUTION INTRAVENOUS; SUBCUTANEOUS
Status: DISCONTINUED | OUTPATIENT
Start: 2023-05-30 | End: 2023-05-30 | Stop reason: HOSPADM

## 2023-05-30 RX ORDER — MIDAZOLAM HYDROCHLORIDE 1 MG/ML
INJECTION INTRAMUSCULAR; INTRAVENOUS
Status: DISCONTINUED | OUTPATIENT
Start: 2023-05-30 | End: 2023-05-30 | Stop reason: HOSPADM

## 2023-05-30 RX ORDER — HYDRALAZINE HYDROCHLORIDE 20 MG/ML
10 INJECTION INTRAMUSCULAR; INTRAVENOUS EVERY 4 HOURS PRN
Status: DISCONTINUED | OUTPATIENT
Start: 2023-05-30 | End: 2023-05-30 | Stop reason: HOSPADM

## 2023-05-30 RX ORDER — SODIUM CHLORIDE 9 MG/ML
INJECTION, SOLUTION INTRAVENOUS ONCE
Status: COMPLETED | OUTPATIENT
Start: 2023-05-30 | End: 2023-05-30

## 2023-05-30 RX ORDER — NITROGLYCERIN 20 MG/100ML
INJECTION INTRAVENOUS
Status: DISCONTINUED | OUTPATIENT
Start: 2023-05-30 | End: 2023-05-30 | Stop reason: HOSPADM

## 2023-05-30 RX ADMIN — SODIUM CHLORIDE: 9 INJECTION, SOLUTION INTRAVENOUS at 07:05

## 2023-05-30 RX ADMIN — DIPHENHYDRAMINE HYDROCHLORIDE 50 MG: 25 CAPSULE ORAL at 07:05

## 2023-05-30 RX ADMIN — DIAZEPAM 10 MG: 5 TABLET ORAL at 07:05

## 2023-05-30 NOTE — Clinical Note
The catheter was removed from the and was repositioned into the left ventricle. Hemodynamics were performed.  and Pullback was recorded.  An angiography was performed of the LV. The angiography was performed via power injection.  EF 65%

## 2023-05-30 NOTE — Clinical Note
The groin and right radial was prepped. The site was prepped with ChloraPrep. The site was clipped. The patient was draped. The patient was positioned supine. The patient was secured using safety straps, with ulnar pads and to an armboard.

## 2023-05-30 NOTE — Clinical Note
The DP pulses were 2+ bilaterally. The left radial pulse was 2+. The right radial pulse was allens test negative.

## 2023-05-30 NOTE — Clinical Note
The catheter was inserted into the and was inserted over the wire into the ostium   right coronary artery. An angiography was performed of the right coronary arteries. Multiple views were taken. The angiography was performed via power injection.

## 2023-05-30 NOTE — DISCHARGE SUMMARY
[facility] - Cath Lab Services  Discharge Note  Short Stay    Procedure(s) (LRB):  Left heart cath (Left)      OUTCOME: Patient tolerated treatment/procedure well without complication and is now ready for discharge.    DISPOSITION: Home or Self Care    FINAL DIAGNOSIS:  stable angina    FOLLOWUP: In clinic    DISCHARGE INSTRUCTIONS:    Discharge Procedure Orders   Diet general         Clinical Reference Documents Added to Patient Instructions         Document    CARDIAC CATHETERIZATION DISCHARGE INSTRUCTIONS (ENGLISH)            TIME SPENT ON DISCHARGE: 35 minutes

## 2023-05-30 NOTE — H&P
Patient name: Mary Fernandez  MRN: [id number]  : 1985  Cath Lab Procedure H&P Update    Pre-Procedure Assessment:    I saw and examined the patient face to face. The patient has been re-evaluated and her condition is unchanged. The reason for admission, procedure and care is still present.  Based on the patients H&P, pre-procedure physical exam, relevant diagnostic studies, NPO status and information obtained from the patient, I determine the patient is an appropriate candidate for the proposed procedure and anesthesia planned. I further certify the anesthesia risks, benefits and options have been explained to the patient to which she agrees as documented on the procedural consent.    See scanned updated H&P and additional records from media tab.      Johnathon Bejarano

## 2023-06-26 ENCOUNTER — PATIENT MESSAGE (OUTPATIENT)
Dept: RHEUMATOLOGY | Facility: CLINIC | Age: 38
End: 2023-06-26
Payer: MEDICAID

## 2023-06-26 DIAGNOSIS — F51.01 PRIMARY INSOMNIA: ICD-10-CM

## 2023-06-26 DIAGNOSIS — M50.30 DEGENERATIVE CERVICAL DISC: ICD-10-CM

## 2023-06-26 DIAGNOSIS — K21.9 GASTROESOPHAGEAL REFLUX DISEASE, UNSPECIFIED WHETHER ESOPHAGITIS PRESENT: ICD-10-CM

## 2023-06-26 DIAGNOSIS — M35.9 UNDIFFERENTIATED CONNECTIVE TISSUE DISEASE: ICD-10-CM

## 2023-06-26 DIAGNOSIS — M32.9 SYSTEMIC LUPUS ERYTHEMATOSUS, UNSPECIFIED SLE TYPE, UNSPECIFIED ORGAN INVOLVEMENT STATUS: ICD-10-CM

## 2023-06-26 DIAGNOSIS — L93.0 DISCOID LUPUS ERYTHEMATOSUS: ICD-10-CM

## 2023-06-26 DIAGNOSIS — M79.7 FIBROMYALGIA: ICD-10-CM

## 2023-06-26 DIAGNOSIS — M51.37 DDD (DEGENERATIVE DISC DISEASE), LUMBOSACRAL: ICD-10-CM

## 2023-06-26 DIAGNOSIS — D33.2 BENIGN NEOPLASM OF BRAIN, UNSPECIFIED BRAIN REGION: ICD-10-CM

## 2023-06-27 RX ORDER — HYDROCODONE BITARTRATE AND ACETAMINOPHEN 10; 325 MG/1; MG/1
1 TABLET ORAL 3 TIMES DAILY PRN
Qty: 90 TABLET | Refills: 0 | Status: SHIPPED | OUTPATIENT
Start: 2023-06-27 | End: 2023-06-28 | Stop reason: SDUPTHER

## 2023-06-27 NOTE — TELEPHONE ENCOUNTER
Patient was last seen 03/29/2023  She canceled her appointment on 06/26/2023  Rescheduled 09/14/2023    Patient canceled appointment yesterday was not sure if you wanted to fill or not. It is pended.   Thank you Greta

## 2023-06-28 ENCOUNTER — PATIENT MESSAGE (OUTPATIENT)
Dept: RHEUMATOLOGY | Facility: CLINIC | Age: 38
End: 2023-06-28
Payer: MEDICAID

## 2023-06-28 DIAGNOSIS — M50.30 DEGENERATIVE CERVICAL DISC: ICD-10-CM

## 2023-06-28 DIAGNOSIS — L93.0 DISCOID LUPUS ERYTHEMATOSUS: ICD-10-CM

## 2023-06-28 DIAGNOSIS — M79.7 FIBROMYALGIA: ICD-10-CM

## 2023-06-28 DIAGNOSIS — M35.9 UNDIFFERENTIATED CONNECTIVE TISSUE DISEASE: ICD-10-CM

## 2023-06-28 DIAGNOSIS — M51.37 DDD (DEGENERATIVE DISC DISEASE), LUMBOSACRAL: ICD-10-CM

## 2023-06-28 DIAGNOSIS — M32.9 SYSTEMIC LUPUS ERYTHEMATOSUS, UNSPECIFIED SLE TYPE, UNSPECIFIED ORGAN INVOLVEMENT STATUS: ICD-10-CM

## 2023-06-28 DIAGNOSIS — F51.01 PRIMARY INSOMNIA: ICD-10-CM

## 2023-06-28 DIAGNOSIS — K21.9 GASTROESOPHAGEAL REFLUX DISEASE, UNSPECIFIED WHETHER ESOPHAGITIS PRESENT: ICD-10-CM

## 2023-06-28 DIAGNOSIS — D33.2 BENIGN NEOPLASM OF BRAIN, UNSPECIFIED BRAIN REGION: ICD-10-CM

## 2023-06-28 RX ORDER — HYDROCODONE BITARTRATE AND ACETAMINOPHEN 10; 325 MG/1; MG/1
1 TABLET ORAL 3 TIMES DAILY PRN
Qty: 90 TABLET | Refills: 0 | Status: SHIPPED | OUTPATIENT
Start: 2023-06-28 | End: 2023-07-31 | Stop reason: SDUPTHER

## 2023-06-28 NOTE — TELEPHONE ENCOUNTER
Patients medication was sent to [facility] pharmacy on yesterday . They don't have it in stock.  Patient is requesting that we send it to [facility] in [place]. Medication pended. Pharmacy   Updated. Please Advise . Thanks   Never smoker

## 2023-07-24 DIAGNOSIS — M51.37 DDD (DEGENERATIVE DISC DISEASE), LUMBOSACRAL: ICD-10-CM

## 2023-07-24 DIAGNOSIS — F51.01 PRIMARY INSOMNIA: ICD-10-CM

## 2023-07-24 DIAGNOSIS — M50.30 DEGENERATIVE CERVICAL DISC: ICD-10-CM

## 2023-07-24 DIAGNOSIS — M79.7 FIBROMYALGIA: ICD-10-CM

## 2023-07-24 DIAGNOSIS — M32.9 SYSTEMIC LUPUS ERYTHEMATOSUS, UNSPECIFIED SLE TYPE, UNSPECIFIED ORGAN INVOLVEMENT STATUS: ICD-10-CM

## 2023-07-24 DIAGNOSIS — M35.9 UNDIFFERENTIATED CONNECTIVE TISSUE DISEASE: ICD-10-CM

## 2023-07-24 DIAGNOSIS — L93.0 DISCOID LUPUS ERYTHEMATOSUS: ICD-10-CM

## 2023-07-24 DIAGNOSIS — D33.2 BENIGN NEOPLASM OF BRAIN, UNSPECIFIED BRAIN REGION: ICD-10-CM

## 2023-07-24 DIAGNOSIS — K21.9 GASTROESOPHAGEAL REFLUX DISEASE, UNSPECIFIED WHETHER ESOPHAGITIS PRESENT: ICD-10-CM

## 2023-07-24 RX ORDER — HYDROCODONE BITARTRATE AND ACETAMINOPHEN 10; 325 MG/1; MG/1
1 TABLET ORAL 3 TIMES DAILY PRN
Qty: 90 TABLET | Refills: 0 | Status: CANCELLED | OUTPATIENT
Start: 2023-07-24 | End: 2023-08-23

## 2023-07-24 NOTE — TELEPHONE ENCOUNTER
Notified the patient that we are unable to fill this medication until after August 1 when  returns. She verbalized understanding

## 2023-07-31 DIAGNOSIS — M51.37 DDD (DEGENERATIVE DISC DISEASE), LUMBOSACRAL: ICD-10-CM

## 2023-07-31 DIAGNOSIS — D33.2 BENIGN NEOPLASM OF BRAIN, UNSPECIFIED BRAIN REGION: ICD-10-CM

## 2023-07-31 DIAGNOSIS — M79.7 FIBROMYALGIA: ICD-10-CM

## 2023-07-31 DIAGNOSIS — M32.9 SYSTEMIC LUPUS ERYTHEMATOSUS, UNSPECIFIED SLE TYPE, UNSPECIFIED ORGAN INVOLVEMENT STATUS: ICD-10-CM

## 2023-07-31 DIAGNOSIS — M35.9 UNDIFFERENTIATED CONNECTIVE TISSUE DISEASE: ICD-10-CM

## 2023-07-31 DIAGNOSIS — L93.0 DISCOID LUPUS ERYTHEMATOSUS: ICD-10-CM

## 2023-07-31 DIAGNOSIS — K21.9 GASTROESOPHAGEAL REFLUX DISEASE, UNSPECIFIED WHETHER ESOPHAGITIS PRESENT: ICD-10-CM

## 2023-07-31 DIAGNOSIS — M50.30 DEGENERATIVE CERVICAL DISC: ICD-10-CM

## 2023-07-31 DIAGNOSIS — F51.01 PRIMARY INSOMNIA: ICD-10-CM

## 2023-08-01 RX ORDER — HYDROCODONE BITARTRATE AND ACETAMINOPHEN 10; 325 MG/1; MG/1
1 TABLET ORAL 3 TIMES DAILY PRN
Qty: 90 TABLET | Refills: 0 | Status: SHIPPED | OUTPATIENT
Start: 2023-08-01 | End: 2023-08-31

## 2023-08-21 DIAGNOSIS — K21.9 GASTROESOPHAGEAL REFLUX DISEASE, UNSPECIFIED WHETHER ESOPHAGITIS PRESENT: ICD-10-CM

## 2023-08-21 DIAGNOSIS — M32.9 SYSTEMIC LUPUS ERYTHEMATOSUS, UNSPECIFIED SLE TYPE, UNSPECIFIED ORGAN INVOLVEMENT STATUS: ICD-10-CM

## 2023-08-21 DIAGNOSIS — F51.01 PRIMARY INSOMNIA: ICD-10-CM

## 2023-08-21 DIAGNOSIS — M35.9 UNDIFFERENTIATED CONNECTIVE TISSUE DISEASE: ICD-10-CM

## 2023-08-21 DIAGNOSIS — M79.7 FIBROMYALGIA: ICD-10-CM

## 2023-08-21 DIAGNOSIS — M51.37 DDD (DEGENERATIVE DISC DISEASE), LUMBOSACRAL: ICD-10-CM

## 2023-08-21 RX ORDER — CLONAZEPAM 1 MG/1
1 TABLET ORAL 3 TIMES DAILY PRN
Qty: 90 TABLET | Refills: 5 | Status: SHIPPED | OUTPATIENT
Start: 2023-08-21

## 2023-08-29 ENCOUNTER — PATIENT MESSAGE (OUTPATIENT)
Dept: RHEUMATOLOGY | Facility: CLINIC | Age: 38
End: 2023-08-29
Payer: MEDICAID

## 2023-08-29 NOTE — TELEPHONE ENCOUNTER
UNFORTUNATELY I CAN NOT INCREASE THE HYDROCODONE.  IF SHE WANTS I CAN PRESCRIBE HER TRAMADOL TO TAKE IN BETWEEN THE HYDROCODONES.  THANK YOU BPH

## 2023-08-30 NOTE — TELEPHONE ENCOUNTER
Unfortunately I can not prescribe something else.  The messages were read in details.  I tried to help patients with some pain medications but I am not a pain management physician.  I can refer her to pain management.  Thank you BPH

## 2023-08-30 NOTE — TELEPHONE ENCOUNTER
Patient does not want Tramadol due to it being on allergy list and she said it hurts her chest as well.

## 2023-09-01 ENCOUNTER — PATIENT MESSAGE (OUTPATIENT)
Dept: RHEUMATOLOGY | Facility: CLINIC | Age: 38
End: 2023-09-01
Payer: MEDICAID

## 2023-09-05 ENCOUNTER — OFFICE VISIT (OUTPATIENT)
Dept: RHEUMATOLOGY | Facility: CLINIC | Age: 38
End: 2023-09-05
Payer: MEDICAID

## 2023-09-05 ENCOUNTER — PATIENT MESSAGE (OUTPATIENT)
Dept: RHEUMATOLOGY | Facility: CLINIC | Age: 38
End: 2023-09-05

## 2023-09-05 VITALS
WEIGHT: 210.38 LBS | HEIGHT: 66 IN | DIASTOLIC BLOOD PRESSURE: 80 MMHG | HEART RATE: 84 BPM | OXYGEN SATURATION: 98 % | TEMPERATURE: 99 F | BODY MASS INDEX: 33.81 KG/M2 | SYSTOLIC BLOOD PRESSURE: 135 MMHG

## 2023-09-05 DIAGNOSIS — F41.9 ANXIETY: ICD-10-CM

## 2023-09-05 DIAGNOSIS — F41.0 PANIC ATTACK: ICD-10-CM

## 2023-09-05 DIAGNOSIS — M51.37 DDD (DEGENERATIVE DISC DISEASE), LUMBOSACRAL: ICD-10-CM

## 2023-09-05 DIAGNOSIS — D33.2 BENIGN NEOPLASM OF BRAIN, UNSPECIFIED BRAIN REGION: ICD-10-CM

## 2023-09-05 DIAGNOSIS — M50.30 DEGENERATIVE CERVICAL DISC: ICD-10-CM

## 2023-09-05 DIAGNOSIS — K21.9 GASTROESOPHAGEAL REFLUX DISEASE, UNSPECIFIED WHETHER ESOPHAGITIS PRESENT: ICD-10-CM

## 2023-09-05 DIAGNOSIS — M79.7 FIBROMYALGIA: ICD-10-CM

## 2023-09-05 DIAGNOSIS — F51.01 PRIMARY INSOMNIA: ICD-10-CM

## 2023-09-05 DIAGNOSIS — M32.9 SYSTEMIC LUPUS ERYTHEMATOSUS, UNSPECIFIED SLE TYPE, UNSPECIFIED ORGAN INVOLVEMENT STATUS: Primary | ICD-10-CM

## 2023-09-05 DIAGNOSIS — L93.0 DISCOID LUPUS ERYTHEMATOSUS: ICD-10-CM

## 2023-09-05 DIAGNOSIS — M32.9 SYSTEMIC LUPUS ERYTHEMATOSUS, UNSPECIFIED SLE TYPE, UNSPECIFIED ORGAN INVOLVEMENT STATUS: ICD-10-CM

## 2023-09-05 DIAGNOSIS — M35.9 UNDIFFERENTIATED CONNECTIVE TISSUE DISEASE: ICD-10-CM

## 2023-09-05 DIAGNOSIS — R21 RASH: ICD-10-CM

## 2023-09-05 PROCEDURE — 3079F DIAST BP 80-89 MM HG: CPT | Mod: CPTII,,, | Performed by: INTERNAL MEDICINE

## 2023-09-05 PROCEDURE — 99999 PR PBB SHADOW E&M-EST. PATIENT-LVL III: ICD-10-PCS | Mod: PBBFAC,,, | Performed by: INTERNAL MEDICINE

## 2023-09-05 PROCEDURE — 1160F RVW MEDS BY RX/DR IN RCRD: CPT | Mod: CPTII,,, | Performed by: INTERNAL MEDICINE

## 2023-09-05 PROCEDURE — 99999 PR PBB SHADOW E&M-EST. PATIENT-LVL III: CPT | Mod: PBBFAC,,, | Performed by: INTERNAL MEDICINE

## 2023-09-05 PROCEDURE — 3079F PR MOST RECENT DIASTOLIC BLOOD PRESSURE 80-89 MM HG: ICD-10-PCS | Mod: CPTII,,, | Performed by: INTERNAL MEDICINE

## 2023-09-05 PROCEDURE — 3008F BODY MASS INDEX DOCD: CPT | Mod: CPTII,,, | Performed by: INTERNAL MEDICINE

## 2023-09-05 PROCEDURE — 99214 PR OFFICE/OUTPT VISIT, EST, LEVL IV, 30-39 MIN: ICD-10-PCS | Mod: S$PBB,,, | Performed by: INTERNAL MEDICINE

## 2023-09-05 PROCEDURE — 3075F PR MOST RECENT SYSTOLIC BLOOD PRESS GE 130-139MM HG: ICD-10-PCS | Mod: CPTII,,, | Performed by: INTERNAL MEDICINE

## 2023-09-05 PROCEDURE — 3008F PR BODY MASS INDEX (BMI) DOCUMENTED: ICD-10-PCS | Mod: CPTII,,, | Performed by: INTERNAL MEDICINE

## 2023-09-05 PROCEDURE — 1160F PR REVIEW ALL MEDS BY PRESCRIBER/CLIN PHARMACIST DOCUMENTED: ICD-10-PCS | Mod: CPTII,,, | Performed by: INTERNAL MEDICINE

## 2023-09-05 PROCEDURE — 3075F SYST BP GE 130 - 139MM HG: CPT | Mod: CPTII,,, | Performed by: INTERNAL MEDICINE

## 2023-09-05 PROCEDURE — 99214 OFFICE O/P EST MOD 30 MIN: CPT | Mod: S$PBB,,, | Performed by: INTERNAL MEDICINE

## 2023-09-05 PROCEDURE — 1159F MED LIST DOCD IN RCRD: CPT | Mod: CPTII,,, | Performed by: INTERNAL MEDICINE

## 2023-09-05 PROCEDURE — 1159F PR MEDICATION LIST DOCUMENTED IN MEDICAL RECORD: ICD-10-PCS | Mod: CPTII,,, | Performed by: INTERNAL MEDICINE

## 2023-09-05 PROCEDURE — 99213 OFFICE O/P EST LOW 20 MIN: CPT | Mod: PBBFAC | Performed by: INTERNAL MEDICINE

## 2023-09-05 RX ORDER — PANTOPRAZOLE SODIUM 40 MG/1
40 TABLET, DELAYED RELEASE ORAL DAILY
Qty: 30 TABLET | Refills: 11 | Status: SHIPPED | OUTPATIENT
Start: 2023-09-05 | End: 2024-09-04

## 2023-09-05 RX ORDER — HYDROXYCHLOROQUINE SULFATE 200 MG/1
200 TABLET, FILM COATED ORAL 2 TIMES DAILY
Qty: 60 TABLET | Refills: 5 | Status: SHIPPED | OUTPATIENT
Start: 2023-09-05

## 2023-09-05 RX ORDER — PREDNISONE 5 MG/1
5 TABLET ORAL DAILY PRN
Qty: 30 TABLET | Refills: 5 | OUTPATIENT
Start: 2023-09-05 | End: 2023-11-09

## 2023-09-05 RX ORDER — CLOTRIMAZOLE AND BETAMETHASONE DIPROPIONATE 10; .64 MG/G; MG/G
CREAM TOPICAL 2 TIMES DAILY
Qty: 45 G | Refills: 1 | Status: SHIPPED | OUTPATIENT
Start: 2023-09-05 | End: 2023-10-02 | Stop reason: SDUPTHER

## 2023-09-05 RX ORDER — HYDROCODONE BITARTRATE AND ACETAMINOPHEN 10; 325 MG/1; MG/1
1 TABLET ORAL 3 TIMES DAILY PRN
Qty: 90 TABLET | Refills: 0 | Status: SHIPPED | OUTPATIENT
Start: 2023-09-05 | End: 2023-09-05 | Stop reason: SDUPTHER

## 2023-09-05 RX ORDER — MYCOPHENOLATE MOFETIL 500 MG/1
1000 TABLET ORAL 2 TIMES DAILY
Qty: 120 TABLET | Refills: 11 | Status: SHIPPED | OUTPATIENT
Start: 2023-09-05 | End: 2024-09-04

## 2023-09-05 RX ORDER — HYDROCODONE BITARTRATE AND ACETAMINOPHEN 10; 325 MG/1; MG/1
1 TABLET ORAL 3 TIMES DAILY PRN
Qty: 90 TABLET | Refills: 0 | Status: CANCELLED | OUTPATIENT
Start: 2023-09-05 | End: 2023-10-05

## 2023-09-05 NOTE — PROGRESS NOTES
Subjective:       Patient ID: Mary Fernandez is a 38 y.o. female.    Chief Complaint: Follow-up (Follow up. Patient complains of generalized joint pain. Pain 8/10. Patient complains of rash on her face, body and under breasts, all over her upper body. She has had it for about 4 days. It is itching and burning. She has been taking the prednisone and that is not helping. She feels like the rash under her breast may be yeast and needs another type of cream. Patient states that she cannot take the Lyrica due to giving her heart palpations and like she can pass out. )    The patient did not receive her benlysta shots. I was not aware of this. I contacted Ms Jinny Iverson at our pharmacy immediately during the visit to investigate this. I also sent a new rx and a new report to get this med refilled.   The patient also did not get her norco last Friday since my nurse (Monie Bentley) told her that she was not seen in 6 months and thus she will be evaluated today. I also was not aware of this. Her last visit was 5.1 months back and not 6 months and thus the norco could have been refilled. I sent the prescription today immediately as I was seeing the patient.   The patient is complaining of joint pain involving the MCP PIP wrist elbow shoulders hips knees and ankles bilaterally.  The pain is 4/10 in intensity dull in quality and continuous.  That is associated with a morning stiffness lasting for more than 60 minutes.  Is also having difficulty maintaining a good night of sleep.  This has been associated with myalgias.  Muscle aches are 4/10 in intensity dull in quality and continuous.  They are associated with fatigue.  No fever no chills no others.  Rash         Review of Systems   Constitutional:  Negative for appetite change, chills and fever.   HENT:  Negative for congestion, ear pain, mouth sores, nosebleeds and trouble swallowing.    Eyes:  Negative for photophobia and discharge.   Respiratory:  Negative for chest  "tightness and shortness of breath.    Cardiovascular:  Negative for chest pain.   Gastrointestinal:  Negative for abdominal pain and vomiting.   Endocrine: Negative.    Genitourinary:  Negative for hematuria.   Musculoskeletal:         As per HPI   Skin:  Positive for rash.   Neurological:  Negative for weakness.         Objective:   /80 (BP Location: Right arm, Patient Position: Sitting, BP Method: Medium (Automatic))   Pulse 84   Temp 98.7 °F (37.1 °C) (Oral)   Ht 5' 6" (1.676 m)   Wt 95.4 kg (210 lb 6.4 oz)   SpO2 98%   BMI 33.96 kg/m²      Physical Exam   Constitutional: She is oriented to person, place, and time. She appears well-developed and well-nourished. No distress.   HENT:   Head: Normocephalic and atraumatic.   Right Ear: External ear normal.   Left Ear: External ear normal.   Eyes: Pupils are equal, round, and reactive to light.   Cardiovascular: Normal rate, regular rhythm and normal heart sounds.   Pulmonary/Chest: Breath sounds normal.   Abdominal: Soft. There is no abdominal tenderness.   Musculoskeletal:      Right shoulder: Tenderness present.      Left shoulder: Tenderness present.      Right elbow: Tenderness present.      Left elbow: Tenderness present.      Right wrist: Tenderness present.      Left wrist: Tenderness present.      Cervical back: Neck supple.      Right hip: Tenderness present.      Left hip: Tenderness present.      Right knee: Tenderness present.      Left knee: Tenderness present.      Right ankle: Tenderness present.      Left ankle: Tenderness present.   Lymphadenopathy:     She has no cervical adenopathy.   Neurological: She is alert and oriented to person, place, and time. She displays normal reflexes. No cranial nerve deficit or sensory deficit. She exhibits normal muscle tone. Coordination normal.   Skin: Rash noted. There is erythema.   Vitals reviewed.      Right Side Rheumatological Exam     The patient is tender to palpation of the shoulder, elbow, " wrist, knee, 1st PIP, 1st MCP, 2nd PIP, 2nd MCP, 3rd PIP, 3rd MCP, 4th PIP, 4th MCP, 5th PIP, hip, ankle, 1st MTP, 2nd MTP, 3rd MTP, 4th MTP, 5th MTP, 1st toe IP, 2nd toe IP, 3rd toe IP, 4th toe IP and 5th toe IP    Left Side Rheumatological Exam     The patient is tender to palpation of the shoulder, elbow, wrist, knee, 1st PIP, 1st MCP, 2nd PIP, 2nd MCP, 3rd PIP, 3rd MCP, 4th PIP, 4th MCP, 5th PIP, 5th MCP, hip, ankle, 1st MTP, 2nd MTP, 3rd MTP, 4th MTP, 5th MTP, 1st toe IP, 2nd toe IP, 3rd toe IP, 4th toe IP and 5th toe IP.         Completed Fibromyalgia exam 18/18 tender points.  No data to display     Assessment:       1. Systemic lupus erythematosus, unspecified SLE type, unspecified organ involvement status    2. Discoid lupus erythematosus    3. Panic attack    4. Degenerative cervical disc    5. DDD (degenerative disc disease), lumbosacral    6. Anxiety    7. Fibromyalgia    8. Primary insomnia    9. Benign neoplasm of brain, unspecified brain region    10. Gastroesophageal reflux disease, unspecified whether esophagitis present    11. Rash          Medication List with Changes/Refills   New Medications    CLOTRIMAZOLE-BETAMETHASONE 1-0.05% (LOTRISONE) CREAM    Apply topically 2 (two) times daily.       Start Date: 9/5/2023  End Date: --    HYDROCODONE-ACETAMINOPHEN (NORCO)  MG PER TABLET    Take 1 tablet by mouth 3 (three) times daily as needed for Pain.       Start Date: 9/5/2023  End Date: 10/5/2023   Current Medications    ALBUTEROL (PROVENTIL/VENTOLIN HFA) 90 MCG/ACTUATION INHALER    Take 2 puffs by mouth every 6 (six) hours as needed.       Start Date: 4/2/2019  End Date: --    CLONAZEPAM (KLONOPIN) 1 MG TABLET    Take 1 tablet (1 mg total) by mouth 3 (three) times daily as needed for Anxiety.       Start Date: 8/21/2023 End Date: --    DUPILUMAB (DUPIXENT) 300 MG/2 ML SYRG    Inject 300 mg into the skin. Every other week       Start Date: 2/18/2019 End Date: --    ESCITALOPRAM OXALATE (LEXAPRO)  20 MG TABLET    Take 1 tablet (20 mg total) by mouth once daily.       Start Date: 1/5/2023  End Date: --    FLUTICASONE PROPIONATE (FLONASE) 50 MCG/ACTUATION NASAL SPRAY    1 spray by Each Nare route once daily.       Start Date: --        End Date: --    LEVOCETIRIZINE (XYZAL) 5 MG TABLET    Take 1 tablet (5 mg total) by mouth every evening.       Start Date: 1/5/2023  End Date: 1/5/2024    METOPROLOL SUCCINATE (TOPROL-XL) 25 MG 24 HR TABLET    Take 1 tablet (25 mg total) by mouth once daily.       Start Date: 4/29/2023 End Date: 9/5/2023   Changed and/or Refilled Medications    Modified Medication Previous Medication    BELIMUMAB (BENLYSTA) 200 MG/ML ATIN belimumab (BENLYSTA) 200 mg/mL AtIn       Inject 1 mL (200 mg total) into the skin every 7 days.    Inject 1 mL (200 mg total) into the skin every 7 days.       Start Date: 9/5/2023  End Date: --    Start Date: 3/29/2023 End Date: 9/5/2023    HYDROXYCHLOROQUINE (PLAQUENIL) 200 MG TABLET hydrOXYchloroQUINE (PLAQUENIL) 200 mg tablet       Take 1 tablet (200 mg total) by mouth 2 (two) times daily.    Take 1 tablet (200 mg total) by mouth 2 (two) times daily.       Start Date: 9/5/2023  End Date: --    Start Date: 1/5/2023  End Date: 9/5/2023    MYCOPHENOLATE (CELLCEPT) 500 MG TAB mycophenolate (CELLCEPT) 500 mg Tab       Take 2 tablets (1,000 mg total) by mouth 2 (two) times daily.    Take 2 tablets (1,000 mg total) by mouth 2 (two) times daily.       Start Date: 9/5/2023  End Date: 9/4/2024    Start Date: 1/5/2023  End Date: 9/5/2023    PANTOPRAZOLE (PROTONIX) 40 MG TABLET pantoprazole (PROTONIX) 40 MG tablet       Take 1 tablet (40 mg total) by mouth once daily.    Take 1 tablet (40 mg total) by mouth once daily.       Start Date: 9/5/2023  End Date: 9/4/2024    Start Date: 4/29/2023 End Date: 9/5/2023    PREDNISONE (DELTASONE) 5 MG TABLET predniSONE (DELTASONE) 5 MG tablet       Take 1 tablet (5 mg total) by mouth daily as needed (flare ups). AFTER BREAKFAST     Take 1 tablet (5 mg total) by mouth daily as needed (flare ups). AFTER BREAKFAST       Start Date: 9/5/2023  End Date: --    Start Date: 1/5/2023  End Date: 9/5/2023   Discontinued Medications    DEXTROAMPHETAMINE-AMPHETAMINE 30 MG TAB    Take 1 tablet by mouth 2 (two) times daily.       Start Date: 6/15/2022 End Date: 9/5/2023    ONDANSETRON (ZOFRAN) 4 MG TABLET    Take 1 tablet (4 mg total) by mouth every 6 (six) hours.       Start Date: 3/14/2023 End Date: 9/5/2023    PREGABALIN (LYRICA) 25 MG CAPSULE    Take 1 capsule (25 mg total) by mouth 2 (two) times daily.       Start Date: 1/5/2023  End Date: 9/5/2023    TIZANIDINE (ZANAFLEX) 2 MG TABLET    Take 1 tablet (2 mg total) by mouth nightly.       Start Date: 1/5/2023  End Date: 9/5/2023         Plan:         Problem List Items Addressed This Visit          Neuro    DDD (degenerative disc disease), lumbosacral    Relevant Medications    belimumab (BENLYSTA) 200 mg/mL AtIn    predniSONE (DELTASONE) 5 MG tablet    pantoprazole (PROTONIX) 40 MG tablet    mycophenolate (CELLCEPT) 500 mg Tab    hydrOXYchloroQUINE (PLAQUENIL) 200 mg tablet    HYDROcodone-acetaminophen (NORCO)  mg per tablet    Degenerative cervical disc    Relevant Medications    belimumab (BENLYSTA) 200 mg/mL AtIn    predniSONE (DELTASONE) 5 MG tablet    pantoprazole (PROTONIX) 40 MG tablet    mycophenolate (CELLCEPT) 500 mg Tab    hydrOXYchloroQUINE (PLAQUENIL) 200 mg tablet    HYDROcodone-acetaminophen (NORCO)  mg per tablet       Psychiatric    Anxiety    Relevant Medications    belimumab (BENLYSTA) 200 mg/mL AtIn    predniSONE (DELTASONE) 5 MG tablet    pantoprazole (PROTONIX) 40 MG tablet    mycophenolate (CELLCEPT) 500 mg Tab    hydrOXYchloroQUINE (PLAQUENIL) 200 mg tablet    HYDROcodone-acetaminophen (NORCO)  mg per tablet    Panic attack    Relevant Medications    belimumab (BENLYSTA) 200 mg/mL AtIn    predniSONE (DELTASONE) 5 MG tablet    pantoprazole (PROTONIX) 40 MG tablet     mycophenolate (CELLCEPT) 500 mg Tab    hydrOXYchloroQUINE (PLAQUENIL) 200 mg tablet    HYDROcodone-acetaminophen (NORCO)  mg per tablet       Derm    Discoid lupus erythematosus    Relevant Medications    belimumab (BENLYSTA) 200 mg/mL AtIn    predniSONE (DELTASONE) 5 MG tablet    pantoprazole (PROTONIX) 40 MG tablet    mycophenolate (CELLCEPT) 500 mg Tab    hydrOXYchloroQUINE (PLAQUENIL) 200 mg tablet    HYDROcodone-acetaminophen (NORCO)  mg per tablet       Immunology/Multi System    Systemic lupus erythematosus - Primary    Relevant Medications    belimumab (BENLYSTA) 200 mg/mL AtIn    predniSONE (DELTASONE) 5 MG tablet    pantoprazole (PROTONIX) 40 MG tablet    mycophenolate (CELLCEPT) 500 mg Tab    hydrOXYchloroQUINE (PLAQUENIL) 200 mg tablet    HYDROcodone-acetaminophen (NORCO)  mg per tablet       Oncology    Benign neoplasm of brain    Relevant Medications    predniSONE (DELTASONE) 5 MG tablet    pantoprazole (PROTONIX) 40 MG tablet    mycophenolate (CELLCEPT) 500 mg Tab    hydrOXYchloroQUINE (PLAQUENIL) 200 mg tablet    HYDROcodone-acetaminophen (NORCO)  mg per tablet       GI    Gastroesophageal reflux disease    Relevant Medications    predniSONE (DELTASONE) 5 MG tablet    pantoprazole (PROTONIX) 40 MG tablet    mycophenolate (CELLCEPT) 500 mg Tab    hydrOXYchloroQUINE (PLAQUENIL) 200 mg tablet    HYDROcodone-acetaminophen (NORCO)  mg per tablet       Orthopedic    Fibromyalgia    Relevant Medications    predniSONE (DELTASONE) 5 MG tablet    pantoprazole (PROTONIX) 40 MG tablet    mycophenolate (CELLCEPT) 500 mg Tab    hydrOXYchloroQUINE (PLAQUENIL) 200 mg tablet    HYDROcodone-acetaminophen (NORCO)  mg per tablet       Other    Insomnia    Relevant Medications    predniSONE (DELTASONE) 5 MG tablet    pantoprazole (PROTONIX) 40 MG tablet    mycophenolate (CELLCEPT) 500 mg Tab    hydrOXYchloroQUINE (PLAQUENIL) 200 mg tablet    HYDROcodone-acetaminophen (NORCO)   mg per tablet     Other Visit Diagnoses       Rash        Relevant Medications    clotrimazole-betamethasone 1-0.05% (LOTRISONE) cream

## 2023-09-06 RX ORDER — HYDROCODONE BITARTRATE AND ACETAMINOPHEN 10; 325 MG/1; MG/1
1 TABLET ORAL 3 TIMES DAILY PRN
Qty: 90 TABLET | Refills: 0 | Status: SHIPPED | OUTPATIENT
Start: 2023-09-06 | End: 2023-10-02 | Stop reason: SDUPTHER

## 2023-09-22 ENCOUNTER — PATIENT MESSAGE (OUTPATIENT)
Dept: RHEUMATOLOGY | Facility: CLINIC | Age: 38
End: 2023-09-22
Payer: MEDICAID

## 2023-09-25 NOTE — TELEPHONE ENCOUNTER
I did call and during the visit clotrimazole-betamethasone 1-0.05 % cream twice daily for the patient for fungal infection in the creases.  Thank you BPH

## 2023-10-02 DIAGNOSIS — M32.9 SYSTEMIC LUPUS ERYTHEMATOSUS, UNSPECIFIED SLE TYPE, UNSPECIFIED ORGAN INVOLVEMENT STATUS: ICD-10-CM

## 2023-10-02 DIAGNOSIS — F51.01 PRIMARY INSOMNIA: ICD-10-CM

## 2023-10-02 DIAGNOSIS — F41.0 PANIC ATTACK: ICD-10-CM

## 2023-10-02 DIAGNOSIS — M79.7 FIBROMYALGIA: ICD-10-CM

## 2023-10-02 DIAGNOSIS — L93.0 DISCOID LUPUS ERYTHEMATOSUS: ICD-10-CM

## 2023-10-02 DIAGNOSIS — M50.30 DEGENERATIVE CERVICAL DISC: ICD-10-CM

## 2023-10-02 DIAGNOSIS — F41.9 ANXIETY: ICD-10-CM

## 2023-10-02 DIAGNOSIS — K21.9 GASTROESOPHAGEAL REFLUX DISEASE, UNSPECIFIED WHETHER ESOPHAGITIS PRESENT: ICD-10-CM

## 2023-10-02 DIAGNOSIS — M51.37 DDD (DEGENERATIVE DISC DISEASE), LUMBOSACRAL: ICD-10-CM

## 2023-10-02 DIAGNOSIS — D33.2 BENIGN NEOPLASM OF BRAIN, UNSPECIFIED BRAIN REGION: ICD-10-CM

## 2023-10-02 DIAGNOSIS — R21 RASH: ICD-10-CM

## 2023-10-02 RX ORDER — CLOTRIMAZOLE AND BETAMETHASONE DIPROPIONATE 10; .64 MG/G; MG/G
CREAM TOPICAL 2 TIMES DAILY
Qty: 45 G | Refills: 1 | Status: SHIPPED | OUTPATIENT
Start: 2023-10-02 | End: 2023-10-23

## 2023-10-04 RX ORDER — HYDROCODONE BITARTRATE AND ACETAMINOPHEN 10; 325 MG/1; MG/1
1 TABLET ORAL 3 TIMES DAILY PRN
Qty: 90 TABLET | Refills: 0 | Status: SHIPPED | OUTPATIENT
Start: 2023-10-04 | End: 2023-10-26 | Stop reason: SDUPTHER

## 2023-10-15 ENCOUNTER — PATIENT MESSAGE (OUTPATIENT)
Dept: RHEUMATOLOGY | Facility: CLINIC | Age: 38
End: 2023-10-15

## 2023-10-16 RX ORDER — METHYLPREDNISOLONE 4 MG/1
TABLET ORAL
Qty: 21 EACH | Refills: 1 | Status: SHIPPED | OUTPATIENT
Start: 2023-10-16

## 2023-10-23 DIAGNOSIS — R21 RASH: ICD-10-CM

## 2023-10-23 RX ORDER — CLOTRIMAZOLE AND BETAMETHASONE DIPROPIONATE 10; .64 MG/G; MG/G
CREAM TOPICAL 2 TIMES DAILY
Qty: 45 G | Refills: 1 | Status: SHIPPED | OUTPATIENT
Start: 2023-10-23 | End: 2023-11-10 | Stop reason: SDUPTHER

## 2023-10-26 DIAGNOSIS — M32.9 SYSTEMIC LUPUS ERYTHEMATOSUS, UNSPECIFIED SLE TYPE, UNSPECIFIED ORGAN INVOLVEMENT STATUS: ICD-10-CM

## 2023-10-26 DIAGNOSIS — K21.9 GASTROESOPHAGEAL REFLUX DISEASE, UNSPECIFIED WHETHER ESOPHAGITIS PRESENT: ICD-10-CM

## 2023-10-26 DIAGNOSIS — F51.01 PRIMARY INSOMNIA: ICD-10-CM

## 2023-10-26 DIAGNOSIS — M79.7 FIBROMYALGIA: ICD-10-CM

## 2023-10-26 DIAGNOSIS — F41.9 ANXIETY: ICD-10-CM

## 2023-10-26 DIAGNOSIS — F41.0 PANIC ATTACK: ICD-10-CM

## 2023-10-26 DIAGNOSIS — D33.2 BENIGN NEOPLASM OF BRAIN, UNSPECIFIED BRAIN REGION: ICD-10-CM

## 2023-10-26 DIAGNOSIS — M50.30 DEGENERATIVE CERVICAL DISC: ICD-10-CM

## 2023-10-26 DIAGNOSIS — L93.0 DISCOID LUPUS ERYTHEMATOSUS: ICD-10-CM

## 2023-10-26 DIAGNOSIS — M51.37 DDD (DEGENERATIVE DISC DISEASE), LUMBOSACRAL: ICD-10-CM

## 2023-10-26 RX ORDER — HYDROCODONE BITARTRATE AND ACETAMINOPHEN 10; 325 MG/1; MG/1
1 TABLET ORAL 3 TIMES DAILY PRN
Qty: 90 TABLET | Refills: 0 | Status: SHIPPED | OUTPATIENT
Start: 2023-10-26 | End: 2023-11-25

## 2023-10-31 ENCOUNTER — PATIENT MESSAGE (OUTPATIENT)
Dept: RHEUMATOLOGY | Facility: CLINIC | Age: 38
End: 2023-10-31

## 2023-11-09 ENCOUNTER — PATIENT MESSAGE (OUTPATIENT)
Dept: RHEUMATOLOGY | Facility: CLINIC | Age: 38
End: 2023-11-09

## 2023-11-09 ENCOUNTER — HOSPITAL ENCOUNTER (EMERGENCY)
Facility: HOSPITAL | Age: 38
Discharge: HOME | End: 2023-11-09
Attending: EMERGENCY MEDICINE

## 2023-11-09 VITALS
OXYGEN SATURATION: 95 % | WEIGHT: 210 LBS | RESPIRATION RATE: 20 BRPM | DIASTOLIC BLOOD PRESSURE: 98 MMHG | HEART RATE: 93 BPM | SYSTOLIC BLOOD PRESSURE: 146 MMHG | TEMPERATURE: 98 F | BODY MASS INDEX: 33.89 KG/M2

## 2023-11-09 DIAGNOSIS — L30.9 DERMATITIS: Primary | ICD-10-CM

## 2023-11-09 DIAGNOSIS — R21 RASH: ICD-10-CM

## 2023-11-09 LAB
MONO SCR (OHS): NEGATIVE
STREP A PCR (OHS): NOT DETECTED

## 2023-11-09 PROCEDURE — 96375 TX/PRO/DX INJ NEW DRUG ADDON: CPT

## 2023-11-09 PROCEDURE — 63600175 PHARM REV CODE 636 W HCPCS: Performed by: NURSE PRACTITIONER

## 2023-11-09 PROCEDURE — 96374 THER/PROPH/DIAG INJ IV PUSH: CPT

## 2023-11-09 PROCEDURE — 99284 EMERGENCY DEPT VISIT MOD MDM: CPT | Mod: 25

## 2023-11-09 PROCEDURE — 87651 STREP A DNA AMP PROBE: CPT | Performed by: NURSE PRACTITIONER

## 2023-11-09 PROCEDURE — 86308 HETEROPHILE ANTIBODY SCREEN: CPT | Performed by: NURSE PRACTITIONER

## 2023-11-09 PROCEDURE — 25000003 PHARM REV CODE 250: Performed by: NURSE PRACTITIONER

## 2023-11-09 RX ORDER — PREDNISONE 20 MG/1
40 TABLET ORAL DAILY
Qty: 8 TABLET | Refills: 0 | Status: SHIPPED | OUTPATIENT
Start: 2023-11-09 | End: 2023-11-13

## 2023-11-09 RX ORDER — FAMOTIDINE 20 MG/1
20 TABLET, FILM COATED ORAL 2 TIMES DAILY
Qty: 14 TABLET | Refills: 0 | Status: SHIPPED | OUTPATIENT
Start: 2023-11-09 | End: 2023-11-16

## 2023-11-09 RX ORDER — HYDROXYZINE PAMOATE 25 MG/1
25 CAPSULE ORAL EVERY 6 HOURS PRN
Qty: 20 CAPSULE | Refills: 0 | Status: SHIPPED | OUTPATIENT
Start: 2023-11-09 | End: 2023-11-14

## 2023-11-09 RX ORDER — CLOTRIMAZOLE AND BETAMETHASONE DIPROPIONATE 10; .64 MG/G; MG/G
CREAM TOPICAL 2 TIMES DAILY
Qty: 45 G | Refills: 1 | Status: CANCELLED | OUTPATIENT
Start: 2023-11-09

## 2023-11-09 RX ORDER — TRIAMCINOLONE ACETONIDE 1 MG/G
OINTMENT TOPICAL
COMMUNITY
Start: 2023-07-19 | End: 2023-11-09 | Stop reason: SDUPTHER

## 2023-11-09 RX ORDER — TRIAMCINOLONE ACETONIDE 1 MG/G
OINTMENT TOPICAL
Qty: 100 G | Refills: 2 | Status: SHIPPED | OUTPATIENT
Start: 2023-11-09

## 2023-11-09 RX ORDER — DIPHENHYDRAMINE HYDROCHLORIDE 50 MG/ML
25 INJECTION INTRAMUSCULAR; INTRAVENOUS
Status: COMPLETED | OUTPATIENT
Start: 2023-11-09 | End: 2023-11-09

## 2023-11-09 RX ORDER — DEXAMETHASONE SODIUM PHOSPHATE 4 MG/ML
8 INJECTION, SOLUTION INTRA-ARTICULAR; INTRALESIONAL; INTRAMUSCULAR; INTRAVENOUS; SOFT TISSUE
Status: COMPLETED | OUTPATIENT
Start: 2023-11-09 | End: 2023-11-09

## 2023-11-09 RX ORDER — FAMOTIDINE 10 MG/ML
40 INJECTION INTRAVENOUS
Status: DISCONTINUED | OUTPATIENT
Start: 2023-11-09 | End: 2023-11-09 | Stop reason: HOSPADM

## 2023-11-09 RX ADMIN — DIPHENHYDRAMINE HYDROCHLORIDE 25 MG: 50 INJECTION INTRAMUSCULAR; INTRAVENOUS at 07:11

## 2023-11-09 RX ADMIN — DEXAMETHASONE SODIUM PHOSPHATE 2 MG: 4 INJECTION, SOLUTION INTRA-ARTICULAR; INTRALESIONAL; INTRAMUSCULAR; INTRAVENOUS; SOFT TISSUE at 07:11

## 2023-11-09 NOTE — FIRST PROVIDER EVALUATION
Medical screening examination initiated.  I have conducted a focused provider triage encounter, findings are as follows:    Brief history of present illness:  Patient states rash, fever, and sore throat. States that her daughter tested positive for mono recently.     Vitals:    11/09/23 1651   BP: (!) 146/98   Pulse: 89   Resp: 20   Temp: 98.4 °F (36.9 °C)   SpO2: 100%   Weight: 95.3 kg (210 lb)       Pertinent physical exam:  Awake, alert, ambulatory    Brief workup plan:  Labs    Preliminary workup initiated; this workup will be continued and followed by the physician or advanced practice provider that is assigned to the patient when roomed.

## 2023-11-10 RX ORDER — FLUCONAZOLE 50 MG/1
50 TABLET ORAL 2 TIMES DAILY
Qty: 14 TABLET | Refills: 0 | Status: SHIPPED | OUTPATIENT
Start: 2023-11-10 | End: 2023-11-17

## 2023-11-10 RX ORDER — CLOTRIMAZOLE AND BETAMETHASONE DIPROPIONATE 10; .64 MG/G; MG/G
CREAM TOPICAL 2 TIMES DAILY
Qty: 90 G | Refills: 1 | Status: SHIPPED | OUTPATIENT
Start: 2023-11-10

## 2023-11-10 NOTE — TELEPHONE ENCOUNTER
I sent 2 tubes now for the cream and I sent something oral to the pharmacy she mentioned.  Thank you BPH

## 2023-11-10 NOTE — ED PROVIDER NOTES
Encounter Date: 11/9/2023       History     Chief Complaint   Patient presents with    Rash     C/o rash to entire body onset last night. Also reports fatigue/sore throat. Denies fevers. States daughter tested positive for mono 2 weeks ago. Denies any new detergents/lotions/meds. Hx lupus. States took benadryl, pepcid, and prednisone approx 3 am with no relief.      See MDM    The history is provided by the patient. No  was used.     Review of patient's allergies indicates:   Allergen Reactions    Hydrocodone-acetaminophen Shortness Of Breath and Swelling     Chest pain, palm of hands sweating     Ibuprofen Anaphylaxis     Other reaction(s): Not Indicated    Metoclopramide Anaphylaxis and Other (See Comments)     Other reaction(s): Paralysis of vertical movement      Carisoprodol Other (See Comments)    Doxycycline      Other reaction(s): Chest pain  Other reaction(s): Chest pain      Gabapentin Other (See Comments)     Other reaction(s): C/O - a headache      Metoclopramide hcl      Other reaction(s): Respiratory arrest    Sucralfate      Chest discomfort    Sulfamethoxazole-trimethoprim      Other reaction(s): hives  Other reaction(s): hives      Tramadol Other (See Comments)    Cephalexin Rash    Clindamycin Rash    Cyclobenzaprine Palpitations     Other reaction(s): SOB and increased heart rate      Morphine Itching    Penicillin v potassium Rash     Other reaction(s): Rash      Pregabalin Palpitations    Rizatriptan Nausea And Vomiting     Past Medical History:   Diagnosis Date    ADHD (attention deficit hyperactivity disorder)     Allergy     Anxiety     Atopic dermatitis 2018    Brain tumor 2008    Depression     Endometriosis     GERD (gastroesophageal reflux disease)     IC (interstitial cystitis)     Obsessive-compulsive disorder     Seizures     Systemic lupus erythematosus, organ or system involvement unspecified      Past Surgical History:   Procedure Laterality Date    AUGMENTATION  OF BREAST      CERVICAL FUSION      HYSTERECTOMY      LAPAROSCOPIC DRAINAGE OF CYST OF OVARY      LEFT HEART CATHETERIZATION Left 5/30/2023    Procedure: Left heart cath;  Surgeon: Johnathon Bejarano MD;  Location: [facility] CATH LAB;  Service: Cardiology;  Laterality: Left;  LHC VIA RRA    NASAL SEPTOPLASTY  2007     Family History   Problem Relation Age of Onset    Drug abuse Mother     Hypertension Mother     Heart disease Father     Hypertension Father     Alcohol abuse Father     Early death Father      Social History     Tobacco Use    Smoking status: Former    Smokeless tobacco: Never   Substance Use Topics    Alcohol use: Not Currently    Drug use: Never     Review of Systems   Constitutional:  Positive for fatigue. Negative for fever.   HENT:  Positive for sore throat.    Respiratory:  Negative for cough and shortness of breath.    Cardiovascular:  Negative for chest pain.   Gastrointestinal:  Negative for abdominal pain.   Genitourinary:  Negative for difficulty urinating and dysuria.   Musculoskeletal:  Negative for gait problem.   Skin:  Positive for rash. Negative for color change.   Neurological:  Negative for dizziness, speech difficulty and headaches.   Psychiatric/Behavioral:  Negative for hallucinations and suicidal ideas.    All other systems reviewed and are negative.      Physical Exam     Initial Vitals [11/09/23 1651]   BP Pulse Resp Temp SpO2   (!) 146/98 89 20 98.4 °F (36.9 °C) 100 %      MAP       --         Physical Exam    Nursing note and vitals reviewed.  Constitutional: She appears well-developed and well-nourished.   HENT:   Head: Normocephalic.   Eyes: EOM are normal.   Neck:   Normal range of motion.  Cardiovascular:  Normal rate, regular rhythm, normal heart sounds and intact distal pulses.           Pulmonary/Chest: Breath sounds normal. No respiratory distress.   Abdominal: Abdomen is soft. Bowel sounds are normal. There is no abdominal tenderness.   Musculoskeletal:         General:  Normal range of motion.      Cervical back: Normal range of motion.     Neurological: She is alert and oriented to person, place, and time. She has normal strength.   Skin: Skin is warm and dry.   Diffuse rash.  Not vesicular.  Maculopapular.  No hand or oral lesions   Psychiatric: She has a normal mood and affect. Her behavior is normal. Judgment and thought content normal.         ED Course   Procedures  Labs Reviewed   STREP GROUP A BY PCR - Normal    Narrative:     The Xpert Xpress Strep A test is a rapid, qualitative in vitro diagnostic test for the detection of Streptococcus pyogenes (Group A ß-hemolytic Streptococcus, Strep A) in throat swab specimens from patients with signs and symptoms of pharyngitis.     MONONUCLEOSIS SCREEN - Normal          Imaging Results    None          Medications   diphenhydrAMINE injection 25 mg (has no administration in time range)   famotidine (PF) injection 40 mg (has no administration in time range)   dexAMETHasone injection 8 mg (has no administration in time range)     Medical Decision Making  Historian:  Patient.  Patient is a 38-year-old female  that presents with fatigue and sore throat that has been present yesterday. Associated symptoms rash and itching. Surrounding information is nothing. Exacerbated by nothing. Relieved by nothing. Patient treatment prior to arrival nothing. Risk factors include nothing. Other history pertaining to this complaint nothing.   Assessment:  See physical exam.  DD:  Strep, mono, contact dermatitis, viral exanthem      Amount and/or Complexity of Data Reviewed  Labs:      Details: Labs were unremarkable  Discussion of management or test interpretation with external provider(s): History was obtained.  Physical was performed.    Risk  Prescription drug management.                               Clinical Impression:   Final diagnoses:  [L30.9] Dermatitis (Primary)        ED Disposition Condition    Discharge Stable          ED Prescriptions        Medication Sig Dispense Start Date End Date Auth. Provider    predniSONE (DELTASONE) 20 MG tablet Take 2 tablets (40 mg total) by mouth once daily. for 4 days 8 tablet 11/9/2023 11/13/2023 Familia Marquez FNP    famotidine (PEPCID) 20 MG tablet Take 1 tablet (20 mg total) by mouth 2 (two) times daily. for 7 days 14 tablet 11/9/2023 11/16/2023 Familia Marquez FNP    hydrOXYzine pamoate (VISTARIL) 25 MG Cap Take 1 capsule (25 mg total) by mouth every 6 (six) hours as needed (itching). 20 capsule 11/9/2023 11/14/2023 Familia Marquez FNP          Follow-up Information    None          Familia Marquez FNP  11/09/23 1926

## 2023-12-06 ENCOUNTER — HOSPITAL ENCOUNTER (EMERGENCY)
Facility: HOSPITAL | Age: 38
Discharge: HOME | End: 2023-12-07
Attending: EMERGENCY MEDICINE

## 2023-12-06 DIAGNOSIS — R03.0 ELEVATED BLOOD PRESSURE READING: ICD-10-CM

## 2023-12-06 DIAGNOSIS — R07.9 CHEST PAIN: ICD-10-CM

## 2023-12-06 DIAGNOSIS — F41.9 ANXIETY: ICD-10-CM

## 2023-12-06 DIAGNOSIS — R07.89 ATYPICAL CHEST PAIN: Primary | ICD-10-CM

## 2023-12-06 LAB
ALBUMIN SERPL-MCNC: 4.2 G/DL (ref 3.5–5)
ALBUMIN/GLOB SERPL: 1.3 RATIO (ref 1.1–2)
ALP SERPL-CCNC: 62 UNIT/L (ref 40–150)
ALT SERPL-CCNC: 21 UNIT/L (ref 0–55)
AST SERPL-CCNC: 13 UNIT/L (ref 5–34)
BASOPHILS # BLD AUTO: 0.02 X10(3)/MCL
BASOPHILS NFR BLD AUTO: 0.2 %
BILIRUB SERPL-MCNC: 0.2 MG/DL
BNP BLD-MCNC: <10 PG/ML
BUN SERPL-MCNC: 19.5 MG/DL (ref 7–18.7)
CALCIUM SERPL-MCNC: 9.5 MG/DL (ref 8.4–10.2)
CHLORIDE SERPL-SCNC: 105 MMOL/L (ref 98–107)
CO2 SERPL-SCNC: 26 MMOL/L (ref 22–29)
CREAT SERPL-MCNC: 0.75 MG/DL (ref 0.55–1.02)
EOSINOPHIL # BLD AUTO: 0.2 X10(3)/MCL (ref 0–0.9)
EOSINOPHIL NFR BLD AUTO: 1.9 %
ERYTHROCYTE [DISTWIDTH] IN BLOOD BY AUTOMATED COUNT: 11.9 % (ref 11.5–17)
GFR SERPLBLD CREATININE-BSD FMLA CKD-EPI: >60 MLS/MIN/1.73/M2
GLOBULIN SER-MCNC: 3.2 GM/DL (ref 2.4–3.5)
GLUCOSE SERPL-MCNC: 110 MG/DL (ref 74–100)
HCT VFR BLD AUTO: 38 % (ref 37–47)
HGB BLD-MCNC: 13.3 G/DL (ref 12–16)
IMM GRANULOCYTES # BLD AUTO: 0.03 X10(3)/MCL (ref 0–0.04)
IMM GRANULOCYTES NFR BLD AUTO: 0.3 %
INR PPP: 1
LYMPHOCYTES # BLD AUTO: 1.63 X10(3)/MCL (ref 0.6–4.6)
LYMPHOCYTES NFR BLD AUTO: 15.9 %
MCH RBC QN AUTO: 30.3 PG (ref 27–31)
MCHC RBC AUTO-ENTMCNC: 35 G/DL (ref 33–36)
MCV RBC AUTO: 86.6 FL (ref 80–94)
MONOCYTES # BLD AUTO: 0.7 X10(3)/MCL (ref 0.1–1.3)
MONOCYTES NFR BLD AUTO: 6.8 %
NEUTROPHILS # BLD AUTO: 7.7 X10(3)/MCL (ref 2.1–9.2)
NEUTROPHILS NFR BLD AUTO: 74.9 %
NRBC BLD AUTO-RTO: 0 %
PLATELET # BLD AUTO: 219 X10(3)/MCL (ref 130–400)
PMV BLD AUTO: 10.8 FL (ref 7.4–10.4)
POTASSIUM SERPL-SCNC: 3.5 MMOL/L (ref 3.5–5.1)
PROT SERPL-MCNC: 7.4 GM/DL (ref 6.4–8.3)
PROTHROMBIN TIME: 12.7 SECONDS (ref 12.5–14.5)
RBC # BLD AUTO: 4.39 X10(6)/MCL (ref 4.2–5.4)
SODIUM SERPL-SCNC: 140 MMOL/L (ref 136–145)
TROPONIN I SERPL-MCNC: <0.01 NG/ML (ref 0–0.04)
WBC # SPEC AUTO: 10.28 X10(3)/MCL (ref 4.5–11.5)

## 2023-12-06 PROCEDURE — 93010 ELECTROCARDIOGRAM REPORT: CPT | Mod: ,,, | Performed by: INTERNAL MEDICINE

## 2023-12-06 PROCEDURE — 85025 COMPLETE CBC W/AUTO DIFF WBC: CPT | Performed by: EMERGENCY MEDICINE

## 2023-12-06 PROCEDURE — 93005 ELECTROCARDIOGRAM TRACING: CPT

## 2023-12-06 PROCEDURE — 93010 EKG 12-LEAD: ICD-10-PCS | Mod: ,,, | Performed by: INTERNAL MEDICINE

## 2023-12-06 PROCEDURE — 85610 PROTHROMBIN TIME: CPT | Performed by: EMERGENCY MEDICINE

## 2023-12-06 PROCEDURE — 84484 ASSAY OF TROPONIN QUANT: CPT | Performed by: EMERGENCY MEDICINE

## 2023-12-06 PROCEDURE — 83880 ASSAY OF NATRIURETIC PEPTIDE: CPT | Performed by: EMERGENCY MEDICINE

## 2023-12-06 PROCEDURE — 25000003 PHARM REV CODE 250: Performed by: EMERGENCY MEDICINE

## 2023-12-06 PROCEDURE — 80053 COMPREHEN METABOLIC PANEL: CPT | Performed by: EMERGENCY MEDICINE

## 2023-12-06 PROCEDURE — 99285 EMERGENCY DEPT VISIT HI MDM: CPT | Mod: 25

## 2023-12-06 RX ORDER — LORAZEPAM 1 MG/1
1 TABLET ORAL
Status: COMPLETED | OUTPATIENT
Start: 2023-12-06 | End: 2023-12-06

## 2023-12-06 RX ADMIN — IOPAMIDOL 100 ML: 755 INJECTION, SOLUTION INTRAVENOUS at 11:12

## 2023-12-06 RX ADMIN — LORAZEPAM 1 MG: 1 TABLET ORAL at 11:12

## 2023-12-07 VITALS
SYSTOLIC BLOOD PRESSURE: 141 MMHG | WEIGHT: 200 LBS | HEIGHT: 66 IN | OXYGEN SATURATION: 95 % | DIASTOLIC BLOOD PRESSURE: 83 MMHG | HEART RATE: 75 BPM | BODY MASS INDEX: 32.14 KG/M2 | RESPIRATION RATE: 17 BRPM | TEMPERATURE: 98 F

## 2023-12-07 LAB — TROPONIN I SERPL-MCNC: <0.01 NG/ML (ref 0–0.04)

## 2023-12-07 PROCEDURE — 25500020 PHARM REV CODE 255: Performed by: EMERGENCY MEDICINE

## 2023-12-07 PROCEDURE — 84484 ASSAY OF TROPONIN QUANT: CPT | Performed by: EMERGENCY MEDICINE

## 2023-12-07 NOTE — ED PROVIDER NOTES
"Encounter Date: 12/6/2023    SCRIBE #1 NOTE: I, Juliet Cedeno, am scribing for, and in the presence of,  Louisa Landrum MD. I have scribed the following portions of the note - Other sections scribed: Louisa Landrum MD.       History     Chief Complaint   Patient presents with    Chest Pain     CP and SOB that happened after pt states "took a different brand of her Norco today at work", symptoms began around 2030. Pt extremely anxious in triage.      38 year old female with history of lupus presents to the ED with complaints of chest pain and SOB onset 2 hours ago. Pt reports that she got a new brand of her Norco prescription today and approximately 30-40 minutes after she took a dose, she began to feel light headed with chest tightness. She states that she is very anxious and that could be the cause of the chest pain, but she still "feels like she could pass out." She complains of a headache and denies symptoms of wheezing, n/v/d, abdominal pain, and LE pain and swelling. Pt notes that she had an angiogram done a few months ago that was normal.     The history is provided by the patient. No  was used.     Review of patient's allergies indicates:   Allergen Reactions    Ibuprofen Anaphylaxis     Other reaction(s): Not Indicated    Metoclopramide Anaphylaxis and Other (See Comments)     Other reaction(s): Paralysis of vertical movement      Carisoprodol Other (See Comments)    Doxycycline      Other reaction(s): Chest pain  Other reaction(s): Chest pain      Gabapentin Other (See Comments)     Other reaction(s): C/O - a headache      Metoclopramide hcl      Other reaction(s): Respiratory arrest    Sucralfate      Chest discomfort    Sulfamethoxazole-trimethoprim      Other reaction(s): hives  Other reaction(s): hives      Tramadol Other (See Comments)    Cephalexin Rash    Clindamycin Rash    Cyclobenzaprine Palpitations     Other reaction(s): SOB and increased heart rate      Morphine " Itching    Penicillin v potassium Rash     Other reaction(s): Rash      Pregabalin Palpitations    Rizatriptan Nausea And Vomiting     Past Medical History:   Diagnosis Date    ADHD (attention deficit hyperactivity disorder)     Allergy     Anxiety     Atopic dermatitis 2018    Brain tumor 2008    Depression     Endometriosis     GERD (gastroesophageal reflux disease)     IC (interstitial cystitis)     Obsessive-compulsive disorder     Seizures     Systemic lupus erythematosus, organ or system involvement unspecified      Past Surgical History:   Procedure Laterality Date    AUGMENTATION OF BREAST      CERVICAL FUSION      HYSTERECTOMY      LAPAROSCOPIC DRAINAGE OF CYST OF OVARY      LEFT HEART CATHETERIZATION Left 5/30/2023    Procedure: Left heart cath;  Surgeon: Johnathon Bejarano MD;  Location: [facility] CATH LAB;  Service: Cardiology;  Laterality: Left;  LHC VIA RRA    NASAL SEPTOPLASTY  2007     Family History   Problem Relation Age of Onset    Drug abuse Mother     Hypertension Mother     Heart disease Father     Hypertension Father     Alcohol abuse Father     Early death Father      Social History     Tobacco Use    Smoking status: Former    Smokeless tobacco: Never   Substance Use Topics    Alcohol use: Not Currently    Drug use: Never     Review of Systems   Constitutional:  Negative for chills, diaphoresis and fever.   HENT:  Negative for congestion, ear pain, sinus pain and sore throat.    Eyes:  Negative for pain, discharge and visual disturbance.   Respiratory:  Positive for shortness of breath. Negative for cough, wheezing and stridor.    Cardiovascular:  Positive for chest pain. Negative for palpitations and leg swelling.   Gastrointestinal:  Negative for abdominal pain, constipation, diarrhea, nausea, rectal pain and vomiting.   Genitourinary:  Negative for dysuria and hematuria.   Musculoskeletal:  Negative for back pain and myalgias.   Skin:  Negative for rash.   Neurological:  Positive for  light-headedness. Negative for dizziness, syncope, numbness and headaches.   Hematological: Negative.    Psychiatric/Behavioral: Negative.     All other systems reviewed and are negative.      Physical Exam     Initial Vitals [12/06/23 2221]   BP Pulse Resp Temp SpO2   (!) 163/108 110 20 98.2 °F (36.8 °C) 98 %      MAP       --         Physical Exam    Nursing note and vitals reviewed.  Constitutional: She appears well-developed and well-nourished. She is not diaphoretic. No distress.   HENT:   Head: Normocephalic and atraumatic.   Nose: Nose normal.   Mouth/Throat: Oropharynx is clear and moist.   Eyes: Conjunctivae and EOM are normal. Pupils are equal, round, and reactive to light.   Neck: Trachea normal. Neck supple.   Normal range of motion.  Cardiovascular:  Regular rhythm, normal heart sounds and intact distal pulses.   Tachycardia present.         No murmur heard.  Pulmonary/Chest: Breath sounds normal. No respiratory distress. She has no wheezes. She has no rhonchi. She has no rales. She exhibits no tenderness.   Abdominal: Abdomen is soft. Bowel sounds are normal. She exhibits no distension and no mass. There is no abdominal tenderness. There is no rebound and no guarding.   Musculoskeletal:         General: No tenderness or edema. Normal range of motion.      Cervical back: Normal range of motion and neck supple.      Lumbar back: Normal. Normal range of motion.     Neurological: She is alert and oriented to person, place, and time. She has normal strength. No cranial nerve deficit or sensory deficit.   Skin: Skin is warm and dry. Capillary refill takes less than 2 seconds. No abscess noted. No erythema. No pallor.   Psychiatric: Her behavior is normal. Judgment and thought content normal. Her mood appears anxious.         ED Course   Procedures  Labs Reviewed   COMPREHENSIVE METABOLIC PANEL - Abnormal; Notable for the following components:       Result Value    Glucose Level 110 (*)     Blood Urea  Nitrogen 19.5 (*)     All other components within normal limits   CBC WITH DIFFERENTIAL - Abnormal; Notable for the following components:    MPV 10.8 (*)     All other components within normal limits   B-TYPE NATRIURETIC PEPTIDE - Normal   TROPONIN I - Normal   PROTIME-INR - Normal   TROPONIN I - Normal   CBC W/ AUTO DIFFERENTIAL    Narrative:     The following orders were created for panel order CBC auto differential.  Procedure                               Abnormality         Status                     ---------                               -----------         ------                     CBC with Differential[[id number]]       Abnormal            Final result                 Please view results for these tests on the individual orders.        ECG Results              EKG 12-lead (Final result)  Result time 12/07/23 00:47:41      Final result by Interface, Lab In [facility] (12/07/23 00:47:41)                   Narrative:    Test Reason : R07.9,    Vent. Rate : 113 BPM     Atrial Rate : 113 BPM     P-R Int : 158 ms          QRS Dur : 096 ms      QT Int : 332 ms       P-R-T Axes : 057 093 -21 degrees     QTc Int : 455 ms    Sinus tachycardia  Rightward axis  ST and T wave abnormality, consider inferior ischemia  Abnormal ECG  Confirmed by Cory Rosario MD ([id number]) on 12/7/2023 12:47:32 AM    Referred By:             Confirmed By:Cory Rosario MD                                  Imaging Results              CTA Chest Non-Coronary (PE Studies) (Preliminary result)  Result time 12/07/23 00:44:44      Preliminary result by Nolberto Love Jr., MD (12/07/23 00:44:44)                   Narrative:    START OF REPORT:  Technique: CT Scan of the chest was performed with intravenous contrast with direct axial images as well as sagittal and coronal reconstruction images pulmonary embolus protocol.    Dosage Information: Automated Exposure Control was utilized.    Comparison: None.    Clinical History: CP and SOB that happened  after pt states took a different brand of her Norco today at work, symptoms began around 2030. Pt extremely anxious in triage. ).    Findings:  Soft Tissues: Bilateral breast implants are seen.  Neck: The thyroid gland appear unremarkable.  Mediastinum: The mediastinal structures are within normal limits.  Heart: The heart size is within normal limits.  Aorta: No aortic dissection or aneurysm is seen.  Pulmonary Arteries: No filling defects are seen in the pulmonary arteries to suggest pulmonary embolus.  Lungs: The lungs are clear with no focal infiltrate or airspace disease.  Pleura: No effusions or pneumothorax are identified.  Bony Structures:  Spine: The visualized dorsal spine appears unremarkable.  Abdomen: Mild fatty infiltration of the liver is noted; the liver is incompletely imaged. The rest of the visualized upper abdominal organs appear unremarkable.      Impression:  1. No filling defects are seen in the pulmonary arteries to suggest pulmonary embolus.  2. Mild fatty infiltration of the liver is noted; the liver is incompletely imaged.  3. No focal infiltrate or consolidation is seen.  4. No acute intrathoracic process identified. Details and other findings as discussed above.                                         X-Ray Chest PA And Lateral (Final result)  Result time 12/06/23 22:47:06      Final result by Chris Miller MD (12/06/23 22:47:06)                   Impression:      No acute disease is seen      Electronically signed by: Chris Miller MD  Date:    12/06/2023  Time:    22:47               Narrative:    EXAMINATION:  XR CHEST PA AND LATERAL    CLINICAL HISTORY:  Chest Pain;    TECHNIQUE:  PA and lateral views of the chest were performed.    COMPARISON:  05/02/2023    FINDINGS:  No infiltrates are seen.  Heart size is within normal limits.  The costophrenic angles are clear.                                    X-Rays:   Independently Interpreted Readings:   Chest X-Ray: Normal heart size.   No infiltrates.  No acute abnormalities.     Medications   LORazepam tablet 1 mg (1 mg Oral Given 12/6/23 6189)   iopamidoL (ISOVUE-370) injection 100 mL (100 mLs Intravenous Given 12/6/23 2694)     Medical Decision Making  Differential diagnosis includes but is not limited to ACS, anxiety, atypical chest pain, anemia, gerd, pneumonia, chest wall pain, electrolyte derangement, cardiac tamponade, pericardial effusion, pneumothorax  Cbc, cmp, trop x 2 , bnp, EKG, cxr, cta ordered and reviewed  Workup unremarkable  Reviewed recent lhc that was completely negative  EKG unchanged, better with ativan  Discussed close f/u with pcp and cardiology as well as return precautions  She voiced understandign and agrees and is comforatblew ith plan  EKG with rightward axis and pt has h/o sle therefore cta ordered to r/o pe given risk factors      Problems Addressed:  Anxiety: acute illness or injury that poses a threat to life or bodily functions  Atypical chest pain: acute illness or injury that poses a threat to life or bodily functions  Chest pain: acute illness or injury that poses a threat to life or bodily functions  Elevated blood pressure reading: undiagnosed new problem with uncertain prognosis    Amount and/or Complexity of Data Reviewed  External Data Reviewed: notes.  Labs: ordered.  Radiology: ordered and independent interpretation performed.  ECG/medicine tests: ordered and independent interpretation performed.    Risk  OTC drugs.  Prescription drug management.            Scribe Attestation:   Scribe #1: I performed the above scribed service and the documentation accurately describes the services I performed. I attest to the accuracy of the note.  Comments: Attending:   Physician Attestation Statement for Scribe #1: I, Louisa Landrum MD, personally performed the services described in this documentation. All medical record entries made by the scribe were at my direction and in my presence.  I have reviewed the  chart and agree that the record reflects my personal performance and is accurate and complete.        Attending Attestation:           Physician Attestation for Scribe:  Physician Attestation Statement for Scribe #1: I, Louisa Landrum MD, reviewed documentation, as scribed by Juliet Cedeno in my presence, and it is both accurate and complete.             ED Course as of 12/07/23 0111   Wed Dec 06, 2023   2302 EKG performed at 2225 rate 113 sinus tachycardia with rightward axis and nonspecific st and t wave abnormality in inferior leads [BS]   Thu Dec 07, 2023   0045 She is much more comfortable after po ativan, is awaiting cta read and repeat troponin [BS]      ED Course User Index  [BS] Louisa Landrum MD               Medical Decision Making:   History:   Old Medical Records: I decided to obtain old medical records.  Old Records Summarized: records from previous admission(s) and records from clinic visits.  Initial Assessment:   See hpi  Independently Interpreted Test(s):   I have ordered and independently interpreted X-rays - see prior notes.  I have ordered and independently interpreted EKG Reading(s) - see prior notes  Clinical Tests:   Lab Tests: Ordered and Reviewed  Radiological Study: Ordered and Reviewed  Medical Tests: Ordered and Reviewed             Clinical Impression:  Final diagnoses:  [R07.9] Chest pain  [R07.89] Atypical chest pain (Primary)  [R03.0] Elevated blood pressure reading  [F41.9] Anxiety          ED Disposition Condition    Discharge Stable          ED Prescriptions    None       Follow-up Information       Follow up With Specialties Details Why Contact Info    Nikki Del Real MD General Surgery Schedule an appointment as soon as possible for a visit   [address] [facility] [id number] [phone]      [facility] - Emergency Dept Emergency Medicine  As needed, If symptoms worsen [address] [facility]  [address]  [phone]    Johnathon Bejarano MD Cardiology   [address] [place] [address].  [facility] [address]  [phone]               Louisa Landrum MD  12/07/23 0111

## 2024-04-02 ENCOUNTER — DOCUMENTATION ONLY (OUTPATIENT)
Dept: RHEUMATOLOGY | Facility: CLINIC | Age: 39
End: 2024-04-02

## 2024-04-02 NOTE — PROGRESS NOTES
Certified letter [id number] [phone] mailed to patient in regard to Rheumatology provider  from organization/ medication refills. Letter returned due to Mary Fernandez incorrect address, etc.

## 2024-10-18 DIAGNOSIS — M06.09 RHEUMATOID ARTHRITIS OF MULTIPLE SITES WITHOUT RHEUMATOID FACTOR: ICD-10-CM

## 2024-10-18 DIAGNOSIS — M50.30 DEGENERATIVE CERVICAL DISC: Primary | ICD-10-CM

## 2024-10-29 ENCOUNTER — HOSPITAL ENCOUNTER (EMERGENCY)
Facility: HOSPITAL | Age: 39
Discharge: HOME | End: 2024-10-29
Attending: EMERGENCY MEDICINE
Payer: MEDICAID

## 2024-10-29 VITALS
OXYGEN SATURATION: 100 % | HEIGHT: 66 IN | BODY MASS INDEX: 24.91 KG/M2 | HEART RATE: 106 BPM | WEIGHT: 155 LBS | DIASTOLIC BLOOD PRESSURE: 88 MMHG | TEMPERATURE: 98 F | SYSTOLIC BLOOD PRESSURE: 140 MMHG | RESPIRATION RATE: 18 BRPM

## 2024-10-29 DIAGNOSIS — N39.0 URINARY TRACT INFECTION WITHOUT HEMATURIA, SITE UNSPECIFIED: Primary | ICD-10-CM

## 2024-10-29 LAB
ALBUMIN SERPL-MCNC: 4 G/DL (ref 3.5–5)
ALBUMIN/GLOB SERPL: 1.3 RATIO (ref 1.1–2)
ALP SERPL-CCNC: 58 UNIT/L (ref 40–150)
ALT SERPL-CCNC: 10 UNIT/L (ref 0–55)
ANION GAP SERPL CALC-SCNC: 7 MEQ/L
AST SERPL-CCNC: 11 UNIT/L (ref 5–34)
BACTERIA #/AREA URNS AUTO: ABNORMAL /HPF
BASOPHILS # BLD AUTO: 0.04 X10(3)/MCL
BASOPHILS NFR BLD AUTO: 0.6 %
BILIRUB SERPL-MCNC: 0.4 MG/DL
BILIRUB UR QL STRIP.AUTO: ABNORMAL
BUN SERPL-MCNC: 15.6 MG/DL (ref 7–18.7)
CALCIUM SERPL-MCNC: 9 MG/DL (ref 8.4–10.2)
CHLORIDE SERPL-SCNC: 106 MMOL/L (ref 98–107)
CLARITY UR: CLEAR
CO2 SERPL-SCNC: 25 MMOL/L (ref 22–29)
COLOR UR AUTO: ABNORMAL
CREAT SERPL-MCNC: 0.75 MG/DL (ref 0.55–1.02)
CREAT/UREA NIT SERPL: 21
EOSINOPHIL # BLD AUTO: 0.31 X10(3)/MCL (ref 0–0.9)
EOSINOPHIL NFR BLD AUTO: 4.3 %
ERYTHROCYTE [DISTWIDTH] IN BLOOD BY AUTOMATED COUNT: 11.9 % (ref 11.5–17)
GFR SERPLBLD CREATININE-BSD FMLA CKD-EPI: >60 ML/MIN/1.73/M2
GLOBULIN SER-MCNC: 3.1 GM/DL (ref 2.4–3.5)
GLUCOSE SERPL-MCNC: 106 MG/DL (ref 74–100)
GLUCOSE UR QL STRIP: NORMAL
HCT VFR BLD AUTO: 37.6 % (ref 37–47)
HGB BLD-MCNC: 13.2 G/DL (ref 12–16)
HGB UR QL STRIP: ABNORMAL
IMM GRANULOCYTES # BLD AUTO: 0.02 X10(3)/MCL (ref 0–0.04)
IMM GRANULOCYTES NFR BLD AUTO: 0.3 %
KETONES UR QL STRIP: NEGATIVE
LEUKOCYTE ESTERASE UR QL STRIP: 250
LYMPHOCYTES # BLD AUTO: 1.24 X10(3)/MCL (ref 0.6–4.6)
LYMPHOCYTES NFR BLD AUTO: 17.1 %
MCH RBC QN AUTO: 30.4 PG (ref 27–31)
MCHC RBC AUTO-ENTMCNC: 35.1 G/DL (ref 33–36)
MCV RBC AUTO: 86.6 FL (ref 80–94)
MONOCYTES # BLD AUTO: 0.67 X10(3)/MCL (ref 0.1–1.3)
MONOCYTES NFR BLD AUTO: 9.3 %
MUCOUS THREADS URNS QL MICRO: ABNORMAL /LPF
NEUTROPHILS # BLD AUTO: 4.96 X10(3)/MCL (ref 2.1–9.2)
NEUTROPHILS NFR BLD AUTO: 68.4 %
NITRITE UR QL STRIP: ABNORMAL
NRBC BLD AUTO-RTO: 0 %
PH UR STRIP: 6 [PH]
PLATELET # BLD AUTO: 204 X10(3)/MCL (ref 130–400)
PMV BLD AUTO: 10.6 FL (ref 7.4–10.4)
POTASSIUM SERPL-SCNC: 3.2 MMOL/L (ref 3.5–5.1)
PROT SERPL-MCNC: 7.1 GM/DL (ref 6.4–8.3)
PROT UR QL STRIP: ABNORMAL
RBC # BLD AUTO: 4.34 X10(6)/MCL (ref 4.2–5.4)
RBC #/AREA URNS AUTO: ABNORMAL /HPF
SODIUM SERPL-SCNC: 138 MMOL/L (ref 136–145)
SP GR UR STRIP.AUTO: 1.03 (ref 1–1.03)
SQUAMOUS #/AREA URNS LPF: ABNORMAL /HPF
UROBILINOGEN UR STRIP-ACNC: 4
WBC # BLD AUTO: 7.24 X10(3)/MCL (ref 4.5–11.5)
WBC #/AREA URNS AUTO: >100 /HPF

## 2024-10-29 PROCEDURE — 81001 URINALYSIS AUTO W/SCOPE: CPT | Performed by: PHYSICIAN ASSISTANT

## 2024-10-29 PROCEDURE — 87077 CULTURE AEROBIC IDENTIFY: CPT | Performed by: PHYSICIAN ASSISTANT

## 2024-10-29 PROCEDURE — 99283 EMERGENCY DEPT VISIT LOW MDM: CPT

## 2024-10-29 PROCEDURE — 81015 MICROSCOPIC EXAM OF URINE: CPT | Performed by: PHYSICIAN ASSISTANT

## 2024-10-29 PROCEDURE — 87186 SC STD MICRODIL/AGAR DIL: CPT | Performed by: PHYSICIAN ASSISTANT

## 2024-10-29 PROCEDURE — 80053 COMPREHEN METABOLIC PANEL: CPT | Performed by: PHYSICIAN ASSISTANT

## 2024-10-29 PROCEDURE — 85025 COMPLETE CBC W/AUTO DIFF WBC: CPT | Performed by: PHYSICIAN ASSISTANT

## 2024-10-29 RX ORDER — NITROFURANTOIN 25; 75 MG/1; MG/1
100 CAPSULE ORAL 2 TIMES DAILY
Qty: 10 CAPSULE | Refills: 0 | Status: SHIPPED | OUTPATIENT
Start: 2024-10-29 | End: 2024-11-03

## 2024-10-31 LAB — BACTERIA UR CULT: ABNORMAL

## 2024-11-19 ENCOUNTER — PATIENT MESSAGE (OUTPATIENT)
Dept: GASTROENTEROLOGY | Facility: CLINIC | Age: 39
End: 2024-11-19
Payer: MEDICAID

## 2025-03-21 DIAGNOSIS — M25.532 LEFT WRIST PAIN: Primary | ICD-10-CM

## (undated) DEVICE — BAND TR COMP DEVICE REG 24CM

## (undated) DEVICE — CANNULA NASAL ADULT

## (undated) DEVICE — KIT GLIDESHEATH SLEND 6FR 10CM

## (undated) DEVICE — GUIDEWIRE INQWIRE SE 3MM JTIP

## (undated) DEVICE — CATH EMPULSE ANGLED 5FR PIGTAI

## (undated) DEVICE — CATH IMPULSE FL4 5FR 100CM

## (undated) DEVICE — SET ANGIO ACIST CVI ANGIOTOUCH

## (undated) DEVICE — Device

## (undated) DEVICE — PAD DEFIB CADENCE ADULT R2

## (undated) DEVICE — COVER PROBE US 5.5X58L NON LTX

## (undated) DEVICE — CATH IMPULSE 5F 100CM FR4

## (undated) DEVICE — DRAPE ANGIO BRACH 38X44IN